# Patient Record
Sex: FEMALE | Race: WHITE | Employment: FULL TIME | URBAN - METROPOLITAN AREA
[De-identification: names, ages, dates, MRNs, and addresses within clinical notes are randomized per-mention and may not be internally consistent; named-entity substitution may affect disease eponyms.]

---

## 2019-06-26 ENCOUNTER — OFFICE VISIT (OUTPATIENT)
Dept: FAMILY MEDICINE CLINIC | Age: 49
End: 2019-06-26
Payer: COMMERCIAL

## 2019-06-26 ENCOUNTER — TELEPHONE (OUTPATIENT)
Dept: PRIMARY CARE CLINIC | Age: 49
End: 2019-06-26

## 2019-06-26 VITALS
BODY MASS INDEX: 27.32 KG/M2 | TEMPERATURE: 98.7 F | OXYGEN SATURATION: 97 % | HEART RATE: 94 BPM | HEIGHT: 66 IN | SYSTOLIC BLOOD PRESSURE: 118 MMHG | WEIGHT: 170 LBS | DIASTOLIC BLOOD PRESSURE: 70 MMHG

## 2019-06-26 DIAGNOSIS — R09.82 POSTNASAL DRIP: ICD-10-CM

## 2019-06-26 DIAGNOSIS — J01.90 ACUTE NON-RECURRENT SINUSITIS, UNSPECIFIED LOCATION: Primary | ICD-10-CM

## 2019-06-26 DIAGNOSIS — J06.9 UPPER RESPIRATORY TRACT INFECTION, UNSPECIFIED TYPE: ICD-10-CM

## 2019-06-26 DIAGNOSIS — R07.0 PAIN IN THROAT: ICD-10-CM

## 2019-06-26 PROCEDURE — 96372 THER/PROPH/DIAG INJ SC/IM: CPT | Performed by: PHYSICIAN ASSISTANT

## 2019-06-26 PROCEDURE — 99214 OFFICE O/P EST MOD 30 MIN: CPT | Performed by: PHYSICIAN ASSISTANT

## 2019-06-26 RX ORDER — METHYLPREDNISOLONE ACETATE 40 MG/ML
40 INJECTION, SUSPENSION INTRA-ARTICULAR; INTRALESIONAL; INTRAMUSCULAR; SOFT TISSUE ONCE
Status: COMPLETED | OUTPATIENT
Start: 2019-06-26 | End: 2019-06-26

## 2019-06-26 RX ORDER — DOXYCYCLINE HYCLATE 100 MG
100 TABLET ORAL 2 TIMES DAILY
Qty: 20 TABLET | Refills: 0 | Status: SHIPPED | OUTPATIENT
Start: 2019-06-26 | End: 2019-07-06

## 2019-06-26 RX ADMIN — METHYLPREDNISOLONE ACETATE 40 MG: 40 INJECTION, SUSPENSION INTRA-ARTICULAR; INTRALESIONAL; INTRAMUSCULAR; SOFT TISSUE at 09:33

## 2019-06-26 NOTE — TELEPHONE ENCOUNTER
They are ordered and [facility] for August 15, 2019. TSH, free T4 and free T3. Diagnosis abnormal weight gain, fatigue. She need them sooner?

## 2019-06-26 NOTE — PROGRESS NOTES
0    Allergies: Allergies   Allergen Reactions    Morphine      Reaction is reddened skin and patient \"blacks out\"    Oxycodone Anxiety    Adhesive Tape      Reaction is sloughing of skin    Alprazolam     Hydrocodone-Acetaminophen     Oseltamivir     Oxycontin [Oxycodone Hcl]      Reaction is reddened skin and patient \"blacks out\"    Tamiflu [Oseltamivir Phosphate]      Reaction is hallucinations    Tamiflu  [Oseltamivir Phosphate] Nausea And Vomiting       Social History:     Social History     Tobacco Use    Smoking status: Never Smoker    Smokeless tobacco: Never Used   Substance Use Topics    Alcohol use: No    Drug use: No       Physical Exam:     Vitals:    06/26/19 0910   BP: 118/70   Pulse: 94   Temp: 98.7 °F (37.1 °C)   TempSrc: Oral   SpO2: 97%   Weight: 170 lb (77.1 kg)   Height: 5' 6\" (1.676 m)       Exam:  Physical Exam  Nurse's notes and vital signs reviewed. The patient is not hypoxic. General: Alert, no acute distress, patient resting comfortably Patient is not toxic or lethargic. Skin: Warm, intact, no pallor noted. There is no evidence of rash at this time. Head: Normocephalic, atraumatic. Eye: Normal conjunctiva  Ears, Nose, Throat: Right tympanic membrane clear, left tympanic membrane clear. No drainage or discharge noted. No pre- or post-auricular tenderness, erythema, or swelling noted. Moderate nasal congestion and rhinorrhea. No facial erythema. Posterior oropharynx shows erythema and cobblestoning, but no hypertrophy, asymmetry or peritonsillar abscess and no evidence of exudate. the uvula is midline. No trismus or drooling is noted. Moist mucous membranes. Neck: No anterior/posterior lymphadenopathy noted. No erythema, no masses, no fluctuance or induration noted. No meningeal signs. Cardio: Regular Rate and Rhythm  Respiratory: No acute distress, no rhonchi, wheezing or crackles noted. No stridor or retractions are noted.   Neurological: A&O x4, normal speech  Psychiatric: Cooperative         Testing:           Medical Decision Making:   The patient has been seen and evaluated. The vital signs have been reviewed. The patient does not appear to be toxic or lethargic. The patient will be started on doxycycline. Patient will be given intramuscular injection of methylprednisone. The patient was educated on the proper dosage of motrin and tylenol and the appropriate intervals of each. The patient is to increase fluid intake over the next several days. The patient is to use OTC decongestant as needed. The patient is to return to express care or go directly to the emergency department should any of the signs or symptoms worsen. The patient is to followup with primary care physician in 2-3 days for repeat evaluation. The patient has no other questions or concerns at this time the patient will be discharged home. Clinical Impression:   Maegan Greenberg was seen today for cough. Diagnoses and all orders for this visit:    Acute non-recurrent sinusitis, unspecified location    Upper respiratory tract infection, unspecified type    Pain in throat    Postnasal drip    Other orders  -     doxycycline hyclate (VIBRA-TABS) 100 MG tablet; Take 1 tablet by mouth 2 times daily for 10 days  -     methylPREDNISolone acetate (DEPO-MEDROL) injection 40 mg        The patient is to call for any concerns or return if any of the signs or symptoms worsen. The patient is to follow-up with PCP in the next 2-3 days for repeat evaluation repeat assessment or go directly to the emergency department.      SIGNATURE: Niki Lake III, PA-C

## 2019-07-19 ENCOUNTER — HOSPITAL ENCOUNTER (OUTPATIENT)
Age: 49
Discharge: HOME | End: 2019-07-21
Payer: COMMERCIAL

## 2019-07-19 ENCOUNTER — OFFICE VISIT (OUTPATIENT)
Dept: FAMILY MEDICINE CLINIC | Age: 49
End: 2019-07-19
Payer: COMMERCIAL

## 2019-07-19 VITALS
SYSTOLIC BLOOD PRESSURE: 136 MMHG | WEIGHT: 173 LBS | TEMPERATURE: 98.2 F | BODY MASS INDEX: 27.92 KG/M2 | OXYGEN SATURATION: 98 % | HEART RATE: 69 BPM | DIASTOLIC BLOOD PRESSURE: 72 MMHG

## 2019-07-19 DIAGNOSIS — R31.9 URINARY TRACT INFECTION WITH HEMATURIA, SITE UNSPECIFIED: ICD-10-CM

## 2019-07-19 DIAGNOSIS — R31.9 URINARY TRACT INFECTION WITH HEMATURIA, SITE UNSPECIFIED: Primary | ICD-10-CM

## 2019-07-19 DIAGNOSIS — N39.0 URINARY TRACT INFECTION WITH HEMATURIA, SITE UNSPECIFIED: ICD-10-CM

## 2019-07-19 DIAGNOSIS — R31.9 HEMATURIA, UNSPECIFIED TYPE: ICD-10-CM

## 2019-07-19 DIAGNOSIS — N39.0 URINARY TRACT INFECTION WITH HEMATURIA, SITE UNSPECIFIED: Primary | ICD-10-CM

## 2019-07-19 LAB
BILIRUBIN, POC: NORMAL
BLOOD URINE, POC: NORMAL
CLARITY, POC: YELLOW
COLOR, POC: NORMAL
GLUCOSE URINE, POC: NORMAL
KETONES, POC: NORMAL
LEUKOCYTE EST, POC: NORMAL
NITRITE, POC: NORMAL
PH, POC: 8
PROTEIN, POC: NORMAL
SPECIFIC GRAVITY, POC: 1.01
UROBILINOGEN, POC: 0.2

## 2019-07-19 PROCEDURE — 81002 URINALYSIS NONAUTO W/O SCOPE: CPT | Performed by: FAMILY MEDICINE

## 2019-07-19 PROCEDURE — 87088 URINE BACTERIA CULTURE: CPT

## 2019-07-19 PROCEDURE — 99213 OFFICE O/P EST LOW 20 MIN: CPT | Performed by: FAMILY MEDICINE

## 2019-07-19 RX ORDER — CEPHALEXIN 500 MG/1
500 CAPSULE ORAL 2 TIMES DAILY
Qty: 14 CAPSULE | Refills: 0 | Status: SHIPPED | OUTPATIENT
Start: 2019-07-19 | End: 2019-07-26

## 2019-07-22 LAB — URINE CULTURE, ROUTINE: NORMAL

## 2019-08-15 ENCOUNTER — OFFICE VISIT (OUTPATIENT)
Dept: PRIMARY CARE CLINIC | Age: 49
End: 2019-08-15
Payer: COMMERCIAL

## 2019-08-15 VITALS
DIASTOLIC BLOOD PRESSURE: 64 MMHG | WEIGHT: 176 LBS | HEIGHT: 66 IN | BODY MASS INDEX: 28.28 KG/M2 | TEMPERATURE: 98 F | SYSTOLIC BLOOD PRESSURE: 118 MMHG

## 2019-08-15 DIAGNOSIS — R74.8 ELEVATED LIVER ENZYMES: ICD-10-CM

## 2019-08-15 DIAGNOSIS — G43.809 OTHER MIGRAINE WITHOUT STATUS MIGRAINOSUS, NOT INTRACTABLE: ICD-10-CM

## 2019-08-15 DIAGNOSIS — M25.50 MULTIPLE JOINT PAIN: ICD-10-CM

## 2019-08-15 DIAGNOSIS — M75.111 NONTRAUMATIC INCOMPLETE TEAR OF RIGHT ROTATOR CUFF: ICD-10-CM

## 2019-08-15 DIAGNOSIS — R63.5 WEIGHT GAIN: ICD-10-CM

## 2019-08-15 DIAGNOSIS — D49.7 PITUITARY NEOPLASM: ICD-10-CM

## 2019-08-15 DIAGNOSIS — M25.562 ACUTE PAIN OF BOTH KNEES: ICD-10-CM

## 2019-08-15 DIAGNOSIS — J30.89 ALLERGIC RHINITIS DUE TO OTHER ALLERGIC TRIGGER, UNSPECIFIED SEASONALITY: ICD-10-CM

## 2019-08-15 DIAGNOSIS — N39.0 URINARY TRACT INFECTION WITHOUT HEMATURIA, SITE UNSPECIFIED: Primary | ICD-10-CM

## 2019-08-15 DIAGNOSIS — M25.561 ACUTE PAIN OF BOTH KNEES: ICD-10-CM

## 2019-08-15 DIAGNOSIS — E66.3 OVERWEIGHT (BMI 25.0-29.9): ICD-10-CM

## 2019-08-15 PROBLEM — J30.9 ALLERGIC RHINITIS: Status: ACTIVE | Noted: 2019-08-15

## 2019-08-15 PROBLEM — G43.909 MIGRAINE: Status: ACTIVE | Noted: 2019-08-15

## 2019-08-15 PROCEDURE — 99215 OFFICE O/P EST HI 40 MIN: CPT | Performed by: FAMILY MEDICINE

## 2019-08-15 RX ORDER — FLUCONAZOLE 150 MG/1
TABLET ORAL
Qty: 2 TABLET | Refills: 0 | Status: SHIPPED | OUTPATIENT
Start: 2019-08-15 | End: 2019-09-03

## 2019-08-15 ASSESSMENT — PATIENT HEALTH QUESTIONNAIRE - PHQ9
SUM OF ALL RESPONSES TO PHQ QUESTIONS 1-9: 1
2. FEELING DOWN, DEPRESSED OR HOPELESS: 0
1. LITTLE INTEREST OR PLEASURE IN DOING THINGS: 1
SUM OF ALL RESPONSES TO PHQ9 QUESTIONS 1 & 2: 1
SUM OF ALL RESPONSES TO PHQ QUESTIONS 1-9: 1

## 2019-08-15 NOTE — ASSESSMENT & PLAN NOTE
Counseled extensively. Differential reviewed, including serious etiologies. Rule out. Possible vaginal candidiasis. Last urine culture negative. Check urinalysis culture. She like empirically treated with Diflucan.   She should see gynecologist ASAP

## 2019-08-15 NOTE — PROGRESS NOTES
including serious etiologies. He is to follow with neurology. Used to take Topamax. Overall states symptoms been stable. Recommended avoidance of triggers . signs and symptoms to watch for discussed. Serious signs and symptoms  reviewed. ER if any. Acute pain of both knees  Counseled extensively. Differential reviewed, including serious etiologies. Declines anything be done now in terms of evaluation treatment. Notify if she changes her mind. Declines x-ray, ultrasound, physical therapy, referral.  Would like autoimmune testing. Multiple joint pain  Counseled extensively. Differential reviewed, including serious etiologies. Check blood work. Declines imaging    Nontraumatic incomplete tear of right rotator cuff  History of. States doing much better. Had Physical therapy. Saw Ortho    Pituitary neoplasm  Counseled extensively. Differential reviewed, including serious etiologies. Blood  work negative in past.  Repeat prolactin level. Refuses repeat imaging including MRI. Refuses seeing endocrinology or neurosurgery. No longer seeing neurology. .      Elevated liver enzymes  History of. Resolved. Monitor         No flowsheet data found. Plan as above. Counseled extensively and differential diagnoses relevant to above were reviewed, including serious etiologies. Side effects and interactions of medications were reviewed. Counseled extensively. She like to try Diflucan. She should see the gynecologist ASAP. Check urinalysis and culture. Check extensive blood work, she is to clear with insurance first.  Declines near leg imaging including x-ray or ultrasound. Declines Ortho referral.  She is going to start Nasacort or equivalent daily as needed plus/minus Allegra daily or equivalent daily as needed. Declines brain MRI/MRA or pituitary imaging.     As long as symptoms steadily improve/resolve keep follow-up September 3, sooner as needed        As long as symptoms steadily improve/resolve, and medical conditions follow the expected course, FU as below, sooner PRN. Return keep 9/3 fu, sooner prn. Over 40 minutes  spent with the patient in reviewing records, reviewing with patient/family, counseling, ordering,  prescribing, completing h&p, etc., with over 50% of the time spent face to face counseling. Signs and symptoms to watch for discussed, serious signs and symptoms reviewed. ER if any. Prabha Downey MD    Patients are advised to check with insurance company to ensure coverage and to fully understand benefits and cost prior to any testing. This note was created with the assistance of voice recognition software. Document was reviewed however may contain grammatical errors.

## 2019-08-19 ENCOUNTER — HOSPITAL ENCOUNTER (OUTPATIENT)
Age: 49
Discharge: HOME | End: 2019-08-21
Payer: COMMERCIAL

## 2019-08-19 ENCOUNTER — TELEPHONE (OUTPATIENT)
Dept: PRIMARY CARE CLINIC | Age: 49
End: 2019-08-19

## 2019-08-19 DIAGNOSIS — M25.50 MULTIPLE JOINT PAIN: ICD-10-CM

## 2019-08-19 DIAGNOSIS — M25.561 ACUTE PAIN OF BOTH KNEES: ICD-10-CM

## 2019-08-19 DIAGNOSIS — G43.809 OTHER MIGRAINE WITHOUT STATUS MIGRAINOSUS, NOT INTRACTABLE: ICD-10-CM

## 2019-08-19 DIAGNOSIS — E66.3 OVERWEIGHT (BMI 25.0-29.9): ICD-10-CM

## 2019-08-19 DIAGNOSIS — J30.89 ALLERGIC RHINITIS DUE TO OTHER ALLERGIC TRIGGER, UNSPECIFIED SEASONALITY: ICD-10-CM

## 2019-08-19 DIAGNOSIS — R63.5 WEIGHT GAIN: ICD-10-CM

## 2019-08-19 DIAGNOSIS — M25.562 ACUTE PAIN OF BOTH KNEES: ICD-10-CM

## 2019-08-19 DIAGNOSIS — N39.0 URINARY TRACT INFECTION WITHOUT HEMATURIA, SITE UNSPECIFIED: ICD-10-CM

## 2019-08-19 DIAGNOSIS — M75.111 NONTRAUMATIC INCOMPLETE TEAR OF RIGHT ROTATOR CUFF: ICD-10-CM

## 2019-08-19 DIAGNOSIS — D49.7 PITUITARY NEOPLASM: ICD-10-CM

## 2019-08-19 LAB
ALBUMIN SERPL-MCNC: 4.3 G/DL (ref 3.5–5.2)
ALP BLD-CCNC: 84 U/L (ref 35–104)
ALT SERPL-CCNC: 11 U/L (ref 0–32)
ANION GAP SERPL CALCULATED.3IONS-SCNC: 15 MMOL/L (ref 7–16)
AST SERPL-CCNC: 15 U/L (ref 0–31)
BACTERIA: NORMAL /HPF
BASOPHILS ABSOLUTE: 0.04 E9/L (ref 0–0.2)
BASOPHILS RELATIVE PERCENT: 0.6 % (ref 0–2)
BILIRUB SERPL-MCNC: 0.8 MG/DL (ref 0–1.2)
BILIRUBIN URINE: NEGATIVE
BLOOD, URINE: NORMAL
BUN BLDV-MCNC: 16 MG/DL (ref 6–20)
CALCIUM SERPL-MCNC: 9.3 MG/DL (ref 8.6–10.2)
CHLORIDE BLD-SCNC: 103 MMOL/L (ref 98–107)
CHOLESTEROL, TOTAL: 176 MG/DL (ref 0–199)
CLARITY: CLEAR
CO2: 25 MMOL/L (ref 22–29)
COLOR: YELLOW
CREAT SERPL-MCNC: 0.6 MG/DL (ref 0.5–1)
EOSINOPHILS ABSOLUTE: 0.16 E9/L (ref 0.05–0.5)
EOSINOPHILS RELATIVE PERCENT: 2.6 % (ref 0–6)
GFR AFRICAN AMERICAN: >60
GFR NON-AFRICAN AMERICAN: >60 ML/MIN/1.73
GLUCOSE BLD-MCNC: 90 MG/DL (ref 74–99)
GLUCOSE URINE: NEGATIVE MG/DL
HCT VFR BLD CALC: 40.7 % (ref 34–48)
HDLC SERPL-MCNC: 77 MG/DL
HEMOGLOBIN: 13 G/DL (ref 11.5–15.5)
IMMATURE GRANULOCYTES #: 0.02 E9/L
IMMATURE GRANULOCYTES %: 0.3 % (ref 0–5)
KETONES, URINE: NEGATIVE MG/DL
LDL CHOLESTEROL CALCULATED: 85 MG/DL (ref 0–99)
LEUKOCYTE ESTERASE, URINE: NEGATIVE
LYMPHOCYTES ABSOLUTE: 2.33 E9/L (ref 1.5–4)
LYMPHOCYTES RELATIVE PERCENT: 37.3 % (ref 20–42)
MCH RBC QN AUTO: 29 PG (ref 26–35)
MCHC RBC AUTO-ENTMCNC: 31.9 % (ref 32–34.5)
MCV RBC AUTO: 90.6 FL (ref 80–99.9)
MONOCYTES ABSOLUTE: 0.43 E9/L (ref 0.1–0.95)
MONOCYTES RELATIVE PERCENT: 6.9 % (ref 2–12)
NEUTROPHILS ABSOLUTE: 3.26 E9/L (ref 1.8–7.3)
NEUTROPHILS RELATIVE PERCENT: 52.3 % (ref 43–80)
NITRITE, URINE: NEGATIVE
PDW BLD-RTO: 13.1 FL (ref 11.5–15)
PH UA: 6 (ref 5–9)
PLATELET # BLD: 226 E9/L (ref 130–450)
PMV BLD AUTO: 10.2 FL (ref 7–12)
POTASSIUM SERPL-SCNC: 3.9 MMOL/L (ref 3.5–5)
PROLACTIN: 17.16 NG/ML
PROTEIN UA: NEGATIVE MG/DL
RBC # BLD: 4.49 E12/L (ref 3.5–5.5)
RBC UA: NORMAL /HPF (ref 0–2)
RHEUMATOID FACTOR: 10 IU/ML (ref 0–13)
SODIUM BLD-SCNC: 143 MMOL/L (ref 132–146)
SPECIFIC GRAVITY UA: <=1.005 (ref 1–1.03)
T4 FREE: 1.27 NG/DL (ref 0.93–1.7)
TOTAL PROTEIN: 6.9 G/DL (ref 6.4–8.3)
TRIGL SERPL-MCNC: 71 MG/DL (ref 0–149)
TSH SERPL DL<=0.05 MIU/L-ACNC: 1.6 UIU/ML (ref 0.27–4.2)
URIC ACID, SERUM: 3.8 MG/DL (ref 2.4–5.7)
UROBILINOGEN, URINE: 0.2 E.U./DL
VLDLC SERPL CALC-MCNC: 14 MG/DL
WBC # BLD: 6.2 E9/L (ref 4.5–11.5)
WBC UA: NORMAL /HPF (ref 0–5)

## 2019-08-19 PROCEDURE — 86225 DNA ANTIBODY NATIVE: CPT

## 2019-08-19 PROCEDURE — 84146 ASSAY OF PROLACTIN: CPT

## 2019-08-19 PROCEDURE — 80061 LIPID PANEL: CPT

## 2019-08-19 PROCEDURE — 84550 ASSAY OF BLOOD/URIC ACID: CPT

## 2019-08-19 PROCEDURE — 84439 ASSAY OF FREE THYROXINE: CPT

## 2019-08-19 PROCEDURE — 86431 RHEUMATOID FACTOR QUANT: CPT

## 2019-08-19 PROCEDURE — 86038 ANTINUCLEAR ANTIBODIES: CPT

## 2019-08-19 PROCEDURE — 81001 URINALYSIS AUTO W/SCOPE: CPT

## 2019-08-19 PROCEDURE — 80053 COMPREHEN METABOLIC PANEL: CPT

## 2019-08-19 PROCEDURE — 86200 CCP ANTIBODY: CPT

## 2019-08-19 PROCEDURE — 85025 COMPLETE CBC W/AUTO DIFF WBC: CPT

## 2019-08-19 PROCEDURE — 36415 COLL VENOUS BLD VENIPUNCTURE: CPT

## 2019-08-19 PROCEDURE — 84443 ASSAY THYROID STIM HORMONE: CPT

## 2019-08-19 PROCEDURE — 87088 URINE BACTERIA CULTURE: CPT

## 2019-08-21 ENCOUNTER — TELEPHONE (OUTPATIENT)
Dept: PRIMARY CARE CLINIC | Age: 49
End: 2019-08-21

## 2019-08-21 LAB
ANTI DNA DOUBLE STRANDED: NEGATIVE
ANTI-NUCLEAR ANTIBODY (ANA): NEGATIVE
URINE CULTURE, ROUTINE: NORMAL

## 2019-08-22 DIAGNOSIS — M79.605 LEG PAIN, BILATERAL: ICD-10-CM

## 2019-08-22 DIAGNOSIS — M79.604 LEG PAIN, BILATERAL: ICD-10-CM

## 2019-08-22 DIAGNOSIS — M25.562 ACUTE PAIN OF BOTH KNEES: Primary | ICD-10-CM

## 2019-08-22 DIAGNOSIS — M25.561 ACUTE PAIN OF BOTH KNEES: Primary | ICD-10-CM

## 2019-08-22 LAB — CCP IGG ANTIBODIES: 3 UNITS (ref 0–19)

## 2019-08-26 RX ORDER — PREDNISONE 10 MG/1
10 TABLET ORAL DAILY
COMMUNITY
End: 2019-09-03

## 2019-08-26 RX ORDER — ALBUTEROL SULFATE 90 UG/1
2 AEROSOL, METERED RESPIRATORY (INHALATION) EVERY 6 HOURS PRN
COMMUNITY
End: 2019-09-03

## 2019-08-26 RX ORDER — CEFDINIR 300 MG/1
300 CAPSULE ORAL 2 TIMES DAILY
COMMUNITY
End: 2019-09-03

## 2019-08-26 SDOH — HEALTH STABILITY: MENTAL HEALTH: HOW OFTEN DO YOU HAVE A DRINK CONTAINING ALCOHOL?: NEVER

## 2019-09-03 ENCOUNTER — OFFICE VISIT (OUTPATIENT)
Dept: PRIMARY CARE CLINIC | Age: 49
End: 2019-09-03
Payer: COMMERCIAL

## 2019-09-03 VITALS
HEART RATE: 68 BPM | SYSTOLIC BLOOD PRESSURE: 110 MMHG | WEIGHT: 178 LBS | BODY MASS INDEX: 28.73 KG/M2 | DIASTOLIC BLOOD PRESSURE: 62 MMHG | OXYGEN SATURATION: 98 %

## 2019-09-03 DIAGNOSIS — D49.7 PITUITARY NEOPLASM: ICD-10-CM

## 2019-09-03 DIAGNOSIS — M75.111 NONTRAUMATIC INCOMPLETE TEAR OF RIGHT ROTATOR CUFF: ICD-10-CM

## 2019-09-03 DIAGNOSIS — M25.50 MULTIPLE JOINT PAIN: ICD-10-CM

## 2019-09-03 DIAGNOSIS — M25.561 ACUTE PAIN OF BOTH KNEES: Primary | ICD-10-CM

## 2019-09-03 DIAGNOSIS — M25.562 ACUTE PAIN OF BOTH KNEES: Primary | ICD-10-CM

## 2019-09-03 DIAGNOSIS — R74.8 ELEVATED LIVER ENZYMES: ICD-10-CM

## 2019-09-03 PROCEDURE — 99214 OFFICE O/P EST MOD 30 MIN: CPT | Performed by: FAMILY MEDICINE

## 2019-09-03 NOTE — PROGRESS NOTES
Multiple joint pain         Urinary tract infection without hematuria  Symptoms resolved. Urinalysis culture negative. Acute pain of both knees  Counseled extensively. Differential reviewed, including serious etiologies. Autoimmune work-up, x-ray and ultrasound all negative. Questionable etiology. Would like to go to [address] [place] [address]. Refer to Ortho. Multiple joint pain  Resolved. Autoimmune work-up negative    Nontraumatic incomplete tear of right rotator cuff  With history of rotator cuff tear and frozen shoulder. Frozen shoulder resolved and overall doing well. Was sent to physical therapy by Ortho and subsequently discharged. Was following with [address] [place] [address] Dr. Selin Arredondo neoplasm  Counseled extensively. Differential reviewed, including serious etiologies. Blood  work negative in past.  Prolactin 9/19 NEG. Still  Refuses repeat imaging including MRI. Refuses seeing endocrinology or neurosurgery. No longer seeing neurology    Elevated liver enzymes  normalized         No flowsheet data found. Plan as above. Counseled extensively and differential diagnoses relevant to above were reviewed, including serious etiologies. Side effects and interactions of medications were reviewed. Would like to see Mihaela Madrid. Declines endocrinology otherwise. Otherwise can follow-up in 6 weeks for physical sooner. As long as symptoms steadily improve/resolve, and medical conditions follow the expected course, FU as below, sooner PRN. Return in about 6 weeks (around 10/15/2019), or if symptoms worsen or fail to improve, for physical.         Signs and symptoms to watch for discussed, serious signs and symptoms reviewed. ER if any. Dexter Eubanks MD    Patients are advised to check with insurance company to ensure coverage and to fully understand benefits and cost prior to any testing. This note was created with the assistance of voice recognition software.   Document was reviewed however may

## 2019-09-03 NOTE — ASSESSMENT & PLAN NOTE
With history of rotator cuff tear and frozen shoulder. Frozen shoulder resolved and overall doing well. Was sent to physical therapy by Ortho and subsequently discharged.   Was following with [facility] Dr. Ahuja Amen

## 2019-09-27 ENCOUNTER — OFFICE VISIT (OUTPATIENT)
Dept: FAMILY MEDICINE CLINIC | Age: 49
End: 2019-09-27
Payer: COMMERCIAL

## 2019-09-27 VITALS
BODY MASS INDEX: 28.61 KG/M2 | DIASTOLIC BLOOD PRESSURE: 70 MMHG | SYSTOLIC BLOOD PRESSURE: 118 MMHG | WEIGHT: 178 LBS | TEMPERATURE: 97.7 F | HEART RATE: 64 BPM | OXYGEN SATURATION: 97 % | HEIGHT: 66 IN

## 2019-09-27 DIAGNOSIS — L25.5 RHUS DERMATITIS: Primary | ICD-10-CM

## 2019-09-27 PROCEDURE — 99213 OFFICE O/P EST LOW 20 MIN: CPT | Performed by: FAMILY MEDICINE

## 2019-09-27 RX ORDER — PREDNISONE 10 MG/1
TABLET ORAL
Qty: 21 TABLET | Refills: 0 | Status: SHIPPED | OUTPATIENT
Start: 2019-09-27 | End: 2019-10-09

## 2019-10-09 ENCOUNTER — OFFICE VISIT (OUTPATIENT)
Dept: PRIMARY CARE CLINIC | Age: 49
End: 2019-10-09
Payer: COMMERCIAL

## 2019-10-09 VITALS
HEART RATE: 66 BPM | OXYGEN SATURATION: 97 % | DIASTOLIC BLOOD PRESSURE: 70 MMHG | SYSTOLIC BLOOD PRESSURE: 128 MMHG | BODY MASS INDEX: 28.08 KG/M2 | WEIGHT: 174 LBS

## 2019-10-09 DIAGNOSIS — Z00.00 HEALTH MAINTENANCE EXAMINATION: Primary | ICD-10-CM

## 2019-10-09 DIAGNOSIS — M25.562 ACUTE PAIN OF BOTH KNEES: ICD-10-CM

## 2019-10-09 DIAGNOSIS — M75.111 NONTRAUMATIC INCOMPLETE TEAR OF RIGHT ROTATOR CUFF: ICD-10-CM

## 2019-10-09 DIAGNOSIS — M25.561 ACUTE PAIN OF BOTH KNEES: ICD-10-CM

## 2019-10-09 DIAGNOSIS — G43.809 OTHER MIGRAINE WITHOUT STATUS MIGRAINOSUS, NOT INTRACTABLE: ICD-10-CM

## 2019-10-09 DIAGNOSIS — E66.3 OVERWEIGHT (BMI 25.0-29.9): ICD-10-CM

## 2019-10-09 DIAGNOSIS — J30.89 ALLERGIC RHINITIS DUE TO OTHER ALLERGIC TRIGGER, UNSPECIFIED SEASONALITY: ICD-10-CM

## 2019-10-09 DIAGNOSIS — R31.9 HEMATURIA, UNSPECIFIED TYPE: ICD-10-CM

## 2019-10-09 DIAGNOSIS — R63.5 WEIGHT GAIN: ICD-10-CM

## 2019-10-09 DIAGNOSIS — D49.7 PITUITARY NEOPLASM: ICD-10-CM

## 2019-10-09 PROCEDURE — 99396 PREV VISIT EST AGE 40-64: CPT | Performed by: FAMILY MEDICINE

## 2019-11-08 PROBLEM — Z00.00 HEALTH MAINTENANCE EXAMINATION: Status: RESOLVED | Noted: 2019-10-09 | Resolved: 2019-11-08

## 2019-11-25 ENCOUNTER — OFFICE VISIT (OUTPATIENT)
Dept: FAMILY MEDICINE CLINIC | Age: 49
End: 2019-11-25
Payer: COMMERCIAL

## 2019-11-25 VITALS
TEMPERATURE: 96.6 F | DIASTOLIC BLOOD PRESSURE: 76 MMHG | SYSTOLIC BLOOD PRESSURE: 120 MMHG | OXYGEN SATURATION: 97 % | HEART RATE: 75 BPM | BODY MASS INDEX: 28.73 KG/M2 | WEIGHT: 178 LBS

## 2019-11-25 DIAGNOSIS — H68.002 SALPINGITIS OF LEFT EUSTACHIAN TUBE: ICD-10-CM

## 2019-11-25 DIAGNOSIS — T14.8XXA SPLINTER IN SKIN: Primary | ICD-10-CM

## 2019-11-25 PROCEDURE — 99213 OFFICE O/P EST LOW 20 MIN: CPT | Performed by: FAMILY MEDICINE

## 2019-11-25 ASSESSMENT — ENCOUNTER SYMPTOMS
ALLERGIC/IMMUNOLOGIC NEGATIVE: 1
GASTROINTESTINAL NEGATIVE: 1
RESPIRATORY NEGATIVE: 1
EYES NEGATIVE: 1

## 2019-12-28 ENCOUNTER — OFFICE VISIT (OUTPATIENT)
Dept: FAMILY MEDICINE CLINIC | Age: 49
End: 2019-12-28
Payer: COMMERCIAL

## 2019-12-28 ENCOUNTER — HOSPITAL ENCOUNTER (OUTPATIENT)
Age: 49
Discharge: HOME | End: 2019-12-30
Payer: COMMERCIAL

## 2019-12-28 VITALS
TEMPERATURE: 97.4 F | HEART RATE: 69 BPM | WEIGHT: 180 LBS | SYSTOLIC BLOOD PRESSURE: 134 MMHG | OXYGEN SATURATION: 96 % | BODY MASS INDEX: 29.05 KG/M2 | DIASTOLIC BLOOD PRESSURE: 72 MMHG

## 2019-12-28 DIAGNOSIS — R09.81 SINUS CONGESTION: ICD-10-CM

## 2019-12-28 DIAGNOSIS — R31.9 HEMATURIA, UNSPECIFIED TYPE: Primary | ICD-10-CM

## 2019-12-28 LAB
APPEARANCE FLUID: NORMAL
BILIRUBIN, POC: NORMAL
BLOOD URINE, POC: NORMAL
CLARITY, POC: CLEAR
COLOR, POC: YELLOW
GLUCOSE URINE, POC: NORMAL
KETONES, POC: NORMAL
LEUKOCYTE EST, POC: NORMAL
NITRITE, POC: NORMAL
PH, POC: 6
PROTEIN, POC: NORMAL
SPECIFIC GRAVITY, POC: 1.02
UROBILINOGEN, POC: 0.2

## 2019-12-28 PROCEDURE — 81002 URINALYSIS NONAUTO W/O SCOPE: CPT | Performed by: NURSE PRACTITIONER

## 2019-12-28 PROCEDURE — 99214 OFFICE O/P EST MOD 30 MIN: CPT | Performed by: NURSE PRACTITIONER

## 2019-12-28 RX ORDER — CIPROFLOXACIN 500 MG/1
500 TABLET, FILM COATED ORAL 2 TIMES DAILY
Qty: 14 TABLET | Refills: 0 | Status: SHIPPED | OUTPATIENT
Start: 2019-12-28 | End: 2020-01-04

## 2019-12-29 DIAGNOSIS — R31.9 HEMATURIA, UNSPECIFIED TYPE: ICD-10-CM

## 2019-12-30 ENCOUNTER — TELEPHONE (OUTPATIENT)
Dept: PRIMARY CARE CLINIC | Age: 49
End: 2019-12-30

## 2019-12-30 ENCOUNTER — TELEPHONE (OUTPATIENT)
Dept: FAMILY MEDICINE CLINIC | Age: 49
End: 2019-12-30

## 2020-02-13 ENCOUNTER — NURSE TRIAGE (OUTPATIENT)
Dept: OTHER | Facility: CLINIC | Age: 50
End: 2020-02-13

## 2020-02-13 ENCOUNTER — APPOINTMENT (OUTPATIENT)
Dept: CT IMAGING | Age: 50
End: 2020-02-13
Payer: COMMERCIAL

## 2020-02-13 ENCOUNTER — HOSPITAL ENCOUNTER (EMERGENCY)
Age: 50
Discharge: HOME | End: 2020-02-13
Attending: EMERGENCY MEDICINE
Payer: COMMERCIAL

## 2020-02-13 ENCOUNTER — TELEPHONE (OUTPATIENT)
Dept: FAMILY MEDICINE CLINIC | Age: 50
End: 2020-02-13

## 2020-02-13 VITALS
HEART RATE: 91 BPM | SYSTOLIC BLOOD PRESSURE: 119 MMHG | DIASTOLIC BLOOD PRESSURE: 56 MMHG | RESPIRATION RATE: 16 BRPM | TEMPERATURE: 98.2 F | OXYGEN SATURATION: 100 %

## 2020-02-13 LAB
ALBUMIN SERPL-MCNC: 4.8 G/DL (ref 3.5–5.2)
ALP BLD-CCNC: 111 U/L (ref 35–104)
ALT SERPL-CCNC: 16 U/L (ref 0–32)
ANION GAP SERPL CALCULATED.3IONS-SCNC: 10 MMOL/L (ref 7–16)
AST SERPL-CCNC: 20 U/L (ref 0–31)
BACTERIA: ABNORMAL /HPF
BASOPHILS ABSOLUTE: 0.02 E9/L (ref 0–0.2)
BASOPHILS RELATIVE PERCENT: 0.4 % (ref 0–2)
BILIRUB SERPL-MCNC: 1.2 MG/DL (ref 0–1.2)
BILIRUBIN URINE: NEGATIVE
BLOOD, URINE: ABNORMAL
BUN BLDV-MCNC: 13 MG/DL (ref 6–20)
CALCIUM SERPL-MCNC: 9.5 MG/DL (ref 8.6–10.2)
CHLORIDE BLD-SCNC: 101 MMOL/L (ref 98–107)
CLARITY: CLEAR
CO2: 25 MMOL/L (ref 22–29)
COLOR: YELLOW
CREAT SERPL-MCNC: 0.7 MG/DL (ref 0.5–1)
EOSINOPHILS ABSOLUTE: 0.08 E9/L (ref 0.05–0.5)
EOSINOPHILS RELATIVE PERCENT: 1.7 % (ref 0–6)
GFR AFRICAN AMERICAN: >60
GFR NON-AFRICAN AMERICAN: >60 ML/MIN/1.73
GLUCOSE BLD-MCNC: 96 MG/DL (ref 74–99)
GLUCOSE URINE: NEGATIVE MG/DL
HCG, URINE, POC: NEGATIVE
HCT VFR BLD CALC: 48.2 % (ref 34–48)
HEMOGLOBIN: 15.5 G/DL (ref 11.5–15.5)
IMMATURE GRANULOCYTES #: 0.01 E9/L
IMMATURE GRANULOCYTES %: 0.2 % (ref 0–5)
KETONES, URINE: NEGATIVE MG/DL
LACTIC ACID: 0.5 MMOL/L (ref 0.5–2.2)
LEUKOCYTE ESTERASE, URINE: NEGATIVE
LIPASE: 21 U/L (ref 13–60)
LYMPHOCYTES ABSOLUTE: 0.92 E9/L (ref 1.5–4)
LYMPHOCYTES RELATIVE PERCENT: 19.4 % (ref 20–42)
Lab: NORMAL
MCH RBC QN AUTO: 28.9 PG (ref 26–35)
MCHC RBC AUTO-ENTMCNC: 32.2 % (ref 32–34.5)
MCV RBC AUTO: 89.9 FL (ref 80–99.9)
MONOCYTES ABSOLUTE: 0.28 E9/L (ref 0.1–0.95)
MONOCYTES RELATIVE PERCENT: 5.9 % (ref 2–12)
MUCUS: PRESENT /LPF
NEGATIVE QC PASS/FAIL: NORMAL
NEUTROPHILS ABSOLUTE: 3.43 E9/L (ref 1.8–7.3)
NEUTROPHILS RELATIVE PERCENT: 72.4 % (ref 43–80)
NITRITE, URINE: NEGATIVE
PDW BLD-RTO: 13.1 FL (ref 11.5–15)
PH UA: 5.5 (ref 5–9)
PLATELET # BLD: 227 E9/L (ref 130–450)
PMV BLD AUTO: 9.2 FL (ref 7–12)
POSITIVE QC PASS/FAIL: NORMAL
POTASSIUM SERPL-SCNC: 3.5 MMOL/L (ref 3.5–5)
PROTEIN UA: NEGATIVE MG/DL
RBC # BLD: 5.36 E12/L (ref 3.5–5.5)
RBC UA: ABNORMAL /HPF (ref 0–2)
SODIUM BLD-SCNC: 136 MMOL/L (ref 132–146)
SPECIFIC GRAVITY UA: >=1.03 (ref 1–1.03)
TOTAL PROTEIN: 7.1 G/DL (ref 6.4–8.3)
UROBILINOGEN, URINE: 0.2 E.U./DL
WBC # BLD: 4.7 E9/L (ref 4.5–11.5)
WBC UA: ABNORMAL /HPF (ref 0–5)

## 2020-02-13 PROCEDURE — 83690 ASSAY OF LIPASE: CPT

## 2020-02-13 PROCEDURE — 2580000003 HC RX 258: Performed by: PHYSICIAN ASSISTANT

## 2020-02-13 PROCEDURE — 74177 CT ABD & PELVIS W/CONTRAST: CPT

## 2020-02-13 PROCEDURE — 83605 ASSAY OF LACTIC ACID: CPT

## 2020-02-13 PROCEDURE — 85025 COMPLETE CBC W/AUTO DIFF WBC: CPT

## 2020-02-13 PROCEDURE — 96375 TX/PRO/DX INJ NEW DRUG ADDON: CPT

## 2020-02-13 PROCEDURE — 2580000003 HC RX 258: Performed by: RADIOLOGY

## 2020-02-13 PROCEDURE — 6370000000 HC RX 637 (ALT 250 FOR IP): Performed by: PHYSICIAN ASSISTANT

## 2020-02-13 PROCEDURE — 99284 EMERGENCY DEPT VISIT MOD MDM: CPT

## 2020-02-13 PROCEDURE — 6360000004 HC RX CONTRAST MEDICATION: Performed by: RADIOLOGY

## 2020-02-13 PROCEDURE — 81001 URINALYSIS AUTO W/SCOPE: CPT

## 2020-02-13 PROCEDURE — 6360000002 HC RX W HCPCS: Performed by: PHYSICIAN ASSISTANT

## 2020-02-13 PROCEDURE — 96374 THER/PROPH/DIAG INJ IV PUSH: CPT

## 2020-02-13 PROCEDURE — 87088 URINE BACTERIA CULTURE: CPT

## 2020-02-13 PROCEDURE — 80053 COMPREHEN METABOLIC PANEL: CPT

## 2020-02-13 RX ORDER — SODIUM CHLORIDE 0.9 % (FLUSH) 0.9 %
10 SYRINGE (ML) INJECTION PRN
Status: DISCONTINUED | OUTPATIENT
Start: 2020-02-13 | End: 2020-02-13 | Stop reason: HOSPADM

## 2020-02-13 RX ORDER — DICYCLOMINE HYDROCHLORIDE 10 MG/1
20 CAPSULE ORAL ONCE
Status: COMPLETED | OUTPATIENT
Start: 2020-02-13 | End: 2020-02-13

## 2020-02-13 RX ORDER — 0.9 % SODIUM CHLORIDE 0.9 %
1000 INTRAVENOUS SOLUTION INTRAVENOUS ONCE
Status: COMPLETED | OUTPATIENT
Start: 2020-02-13 | End: 2020-02-13

## 2020-02-13 RX ORDER — DICYCLOMINE HYDROCHLORIDE 10 MG/1
20 CAPSULE ORAL
Qty: 15 CAPSULE | Refills: 0 | Status: SHIPPED | OUTPATIENT
Start: 2020-02-13 | End: 2020-08-31

## 2020-02-13 RX ORDER — ONDANSETRON 2 MG/ML
4 INJECTION INTRAMUSCULAR; INTRAVENOUS ONCE
Status: COMPLETED | OUTPATIENT
Start: 2020-02-13 | End: 2020-02-13

## 2020-02-13 RX ORDER — METRONIDAZOLE 500 MG/1
500 TABLET ORAL 2 TIMES DAILY
Qty: 20 TABLET | Refills: 0 | Status: SHIPPED | OUTPATIENT
Start: 2020-02-13 | End: 2020-02-14 | Stop reason: SDUPTHER

## 2020-02-13 RX ORDER — CIPROFLOXACIN 500 MG/1
500 TABLET, FILM COATED ORAL 2 TIMES DAILY
Qty: 20 TABLET | Refills: 0 | Status: SHIPPED | OUTPATIENT
Start: 2020-02-13 | End: 2020-02-23

## 2020-02-13 RX ORDER — ONDANSETRON 4 MG/1
4 TABLET, ORALLY DISINTEGRATING ORAL EVERY 8 HOURS PRN
Qty: 24 TABLET | Refills: 0 | Status: SHIPPED | OUTPATIENT
Start: 2020-02-13 | End: 2020-08-31

## 2020-02-13 RX ORDER — KETOROLAC TROMETHAMINE 30 MG/ML
15 INJECTION, SOLUTION INTRAMUSCULAR; INTRAVENOUS ONCE
Status: COMPLETED | OUTPATIENT
Start: 2020-02-13 | End: 2020-02-13

## 2020-02-13 RX ADMIN — Medication 10 ML: at 14:11

## 2020-02-13 RX ADMIN — ONDANSETRON 4 MG: 2 INJECTION INTRAMUSCULAR; INTRAVENOUS at 12:55

## 2020-02-13 RX ADMIN — KETOROLAC TROMETHAMINE 15 MG: 30 INJECTION, SOLUTION INTRAMUSCULAR; INTRAVENOUS at 12:55

## 2020-02-13 RX ADMIN — DICYCLOMINE HYDROCHLORIDE 20 MG: 10 CAPSULE ORAL at 12:55

## 2020-02-13 RX ADMIN — SODIUM CHLORIDE 1000 ML: 9 INJECTION, SOLUTION INTRAVENOUS at 12:44

## 2020-02-13 RX ADMIN — IOPAMIDOL 110 ML: 755 INJECTION, SOLUTION INTRAVENOUS at 14:11

## 2020-02-13 ASSESSMENT — PAIN SCALES - GENERAL: PAINLEVEL_OUTOF10: 8

## 2020-02-13 NOTE — ED PROVIDER NOTES
[facility]     Department of Emergency Medicine   ED  Provider Note  Admit Date/RoomTime: 2/13/2020 12:03 PM  ED Room: EDIN/EDIN  Chief Complaint   Diarrhea (Started yesterday. [facility]  )    History of Present Illness   Source of history provided by:  patient. History/Exam Limitations: none. Khanh Geller is a 47 y.o. old female who has a past medical history of:   Past Medical History:   Diagnosis Date    Blood in urine     history of    Constipation     Endometriosis     Migraines     Raynaud's disease       presents to the emergency department by private vehicle, for complaints of gradual onset, still present, constant nausea, vomiting & diarrhea which began 1 day(s) prior to arrival.  There has been no similar episodes in the past. She states: That she has been caring for her mother who currently has C. difficile. Stool quality described as watery and profuse. The symptoms are associated with chills, generalized abdominal cramping, and anorexia. Symptoms are aggravated by eating and liquids and relieved by nothing. There has been no additional symptoms of documented fevers, sweats, headache, vertigo, lightheadedness, chest pain, shortness of breath, palpitations hematemesis, hematochezia, melena, or urinary complaints. .    ROS    Pertinent positives and negatives are stated within HPI, all other systems reviewed and are negative. Past Surgical History:   Procedure Laterality Date    ANKLE SURGERY      APPENDECTOMY      CHOLECYSTECTOMY      CYSTOSCOPY      HERNIA REPAIR      LAPAROSCOPY      multiple for endometriosis    TONSILLECTOMY       Social History:  reports that she has never smoked. She has never used smokeless tobacco. She reports that she does not drink alcohol or use drugs.   Family History: family history includes Allergy (Severe) in her father; Cancer in her paternal cousin; Heart Disease in her father and mother; Other in her maternal aunt, maternal grandmother, and mother; Thyroid Disease in her mother. Allergies: Morphine; Oxycodone; Adhesive tape; Alprazolam; Hydrocodone-acetaminophen; Influenza vaccines; Oseltamivir; Oxycontin [oxycodone hcl]; Tamiflu [oseltamivir phosphate]; and Tamiflu  [oseltamivir phosphate]    Physical Exam          ED Triage Vitals [02/13/20 1201]   BP Temp Temp Source Pulse Resp SpO2 Height Weight   (!) 119/56 98.2 °F (36.8 °C) Oral 91 16 100 % -- --      Oxygen Saturation Interpretation: Normal.    Constitutional:  Alert, development consistent with age. HEENT:  NC/NT. Mucus membranes dry. Airway patent. Neck:  Normal ROM. Supple. Respiratory:  Clear to auscultation and breath sounds equal.  CV:  Regular rate and rhythm, normal heart sounds, without pathological murmurs, ectopy, gallops, or rubs. GI:  General Appearance: normal.       Bowel sounds: hyperactive bowel sounds. Distension:  None. Tenderness: mild tenderness is present in the entire abdomen, no rebound tenderness, no guarding. Liver: non-tender. Spleen:  non-tender. Pulsatile Mass: absent. Rectal: (chaperone present during examination). not indicated / deferred. Integument:  Normal turgor. Warm, dry, without visible rash, unless noted elsewhere. Lymphatics: No lymphangitis or adenopathy noted. Neurological:  Oriented. Motor functions intact.     Lab / Imaging Results   (All laboratory and radiology results have been personally reviewed by myself)  Labs:  Results for orders placed or performed during the hospital encounter of 02/13/20   Comprehensive Metabolic Panel   Result Value Ref Range    Sodium 136 132 - 146 mmol/L    Potassium 3.5 3.5 - 5.0 mmol/L    Chloride 101 98 - 107 mmol/L    CO2 25 22 - 29 mmol/L    Anion Gap 10 7 - 16 mmol/L    Glucose 96 74 - 99 mg/dL    BUN 13 6 - 20 mg/dL    CREATININE 0.7 0.5 - 1.0 mg/dL    GFR Non-African American >60 >=60 mL/min/1.73    GFR African American >60     Calcium 9.5 8.6 - 10.2 mg/dL    Total Protein 7.1 6.4 - 8.3 g/dL    Alb 4.8 3.5 - 5.2 g/dL    Total Bilirubin 1.2 0.0 - 1.2 mg/dL    Alkaline Phosphatase 111 (H) 35 - 104 U/L    ALT 16 0 - 32 U/L    AST 20 0 - 31 U/L   CBC Auto Differential   Result Value Ref Range    WBC 4.7 4.5 - 11.5 E9/L    RBC 5.36 3.50 - 5.50 E12/L    Hemoglobin 15.5 11.5 - 15.5 g/dL    Hematocrit 48.2 (H) 34.0 - 48.0 %    MCV 89.9 80.0 - 99.9 fL    MCH 28.9 26.0 - 35.0 pg    MCHC 32.2 32.0 - 34.5 %    RDW 13.1 11.5 - 15.0 fL    Platelets 976 829 - 583 E9/L    MPV 9.2 7.0 - 12.0 fL    Neutrophils % 72.4 43.0 - 80.0 %    Immature Granulocytes % 0.2 0.0 - 5.0 %    Lymphocytes % 19.4 (L) 20.0 - 42.0 %    Monocytes % 5.9 2.0 - 12.0 %    Eosinophils % 1.7 0.0 - 6.0 %    Basophils % 0.4 0.0 - 2.0 %    Neutrophils Absolute 3.43 1.80 - 7.30 E9/L    Immature Granulocytes # 0.01 E9/L    Lymphocytes Absolute 0.92 (L) 1.50 - 4.00 E9/L    Monocytes Absolute 0.28 0.10 - 0.95 E9/L    Eosinophils Absolute 0.08 0.05 - 0.50 E9/L    Basophils Absolute 0.02 0.00 - 0.20 E9/L   Lipase   Result Value Ref Range    Lipase 21 13 - 60 U/L   Urinalysis   Result Value Ref Range    Color, UA Yellow Straw/Yellow    Clarity, UA Clear Clear    Glucose, Ur Negative Negative mg/dL    Bilirubin Urine Negative Negative    Ketones, Urine Negative Negative mg/dL    Specific Gravity, UA >=1.030 1.005 - 1.030    Blood, Urine LARGE (A) Negative    pH, UA 5.5 5.0 - 9.0    Protein, UA Negative Negative mg/dL    Urobilinogen, Urine 0.2 <2.0 E.U./dL    Nitrite, Urine Negative Negative    Leukocyte Esterase, Urine Negative Negative   Lactic Acid, Plasma   Result Value Ref Range    Lactic Acid 0.5 0.5 - 2.2 mmol/L   Microscopic Urinalysis   Result Value Ref Range    Mucus, UA Present (A) None Seen /LPF    WBC, UA NONE 0 - 5 /HPF    RBC, UA 2-5 0 - 2 /HPF    Bacteria, UA NONE SEEN None Seen /HPF   POC Pregnancy Urine Qual   Result Value Ref Range HCG, Urine, POC Negative Negative    Lot Number [id number]     Positive QC Pass/Fail Pass     Negative QC Pass/Fail Pass      Imaging: All Radiology results interpreted by Radiologist unless otherwise noted. CT ABDOMEN PELVIS W IV CONTRAST Additional Contrast? None   Final Result         1. Liquid stool in the colon indicating diarrhea. No bowel wall   thickening or obstruction. 2. Small subserosal fibroid uterus. 3. Tiny renal cysts. 4. Transitional S1 vertebra. ED Course / Medical Decision Making     Medications   0.9 % sodium chloride bolus (0 mLs Intravenous Stopped 2/13/20 1330)   ondansetron (ZOFRAN) injection 4 mg (4 mg Intravenous Given 2/13/20 1255)   ketorolac (TORADOL) injection 15 mg (15 mg Intravenous Given 2/13/20 1255)   dicyclomine (BENTYL) capsule 20 mg (20 mg Oral Given 2/13/20 1255)   iopamidol (ISOVUE-370) 76 % injection 110 mL (110 mLs Intravenous Given 2/13/20 1411)      Re-examination:  2/13/20       Time: 1500   Patient's symptoms are improving. Results discussed. Consult(s):   none. Procedure(s):   none    MDM:   Patient presents to the emergency department for nausea, vomiting, and diarrhea, and generalized abdominal pain. Patient had improvement of her symptoms with the medications above. Due to her history of recent exposure to C. difficile, she will be given the prescriptions below. Vital signs stable. All lab work was reviewed and discussed with the patient. She has been encouraged to follow-up with her primary care provider, as needed. Specific conditions for emergent return have been discussed and the patient verbalized understanding to return immediately for new or worsening symptoms. Counseling: The emergency provider has spoken with the patient and discussed todays results, in addition to providing specific details for the plan of care and counseling regarding the diagnosis and prognosis.   Questions are answered at this time and they are agreeable with the plan. Assessment     1. Nausea vomiting and diarrhea    2. Generalized abdominal pain       Plan   Discharge to home  Patient condition is good    New Medications     Discharge Medication List as of 2/13/2020  3:10 PM      START taking these medications    Details   ciprofloxacin (CIPRO) 500 MG tablet Take 1 tablet by mouth 2 times daily for 10 days, Disp-20 tablet, R-0Print      metroNIDAZOLE (FLAGYL) 500 MG tablet Take 1 tablet by mouth 2 times daily for 10 days, Disp-20 tablet, R-0Print      ondansetron (ZOFRAN ODT) 4 MG disintegrating tablet Take 1 tablet by mouth every 8 hours as needed for Nausea or Vomiting, Disp-24 tablet, R-0Print      dicyclomine (BENTYL) 10 MG capsule Take 2 capsules by mouth 4 times daily (before meals and nightly), Disp-15 capsule, R-nonePrint           Electronically signed by Raquel Mata PA-C   DD: 2/13/20  **This report was transcribed using voice recognition software. Every effort was made to ensure accuracy; however, inadvertent computerized transcription errors may be present.   END OF ED PROVIDER NOTE        Raquel Mata PA-C  02/13/20 0540

## 2020-02-13 NOTE — TELEPHONE ENCOUNTER
Pt called and is very upset as she went to [facility] and stated they did not do anything for her. She wants to speak to  personally.  Please advise

## 2020-02-13 NOTE — TELEPHONE ENCOUNTER
That amt of diarrhea and sxs definitely warrants er for IVF, BW , CXS, possible imaging. At a minimum needs seen here. If clinically looks bad perhaps she would be talked into ER in person. So at a minimum, express care for eval, but ER best..

## 2020-02-13 NOTE — ED NOTES
Patient discharged with belongings. Discussed care instructions, follow-up instructions, medications and when to return to the hospital. Patient verbalizes understanding and has no further questions at this time. Electronically signed by Christophe Keys RN on 2/13/2020 at 3:42 PM       Ranjan Saravia RN  02/13/20 7791

## 2020-02-14 RX ORDER — METRONIDAZOLE 500 MG/1
500 TABLET ORAL 3 TIMES DAILY
Qty: 30 TABLET | Refills: 0 | Status: SHIPPED | OUTPATIENT
Start: 2020-02-14 | End: 2020-02-24

## 2020-02-14 NOTE — TELEPHONE ENCOUNTER
I called the patient. She was seen in the emergency room. They did several labs, urinalysis. There was blood but she states there is always blood. They did a CT of the abdomen and pelvis. They gave her a bolus of IV fluids. They sent her home with Cipro Flagyl Bentyl and Zofran. Her concerns that she still feels very weak and very dehydrated. She felt like they needed to give her more IV fluids. She needs to go back to the ER she is still that weak and dehydrated for fluids and discussed the potential seriousness of C. difficile, discussed recommendations for hospitalization and risk of serious complications. She needs follow-up with me ASAP as well. Her labs are relatively unremarkable except for the blood in the urine and high specific gravity. CAT scan showed liquid stool in the colon indicating diarrhea, but no bowel wall thickening or obstruction, there was a fibroid in the uterus that she needs to see gynecology, tiny renal cysts, transitional S1 vertebrae. They sent her home with Cipro 500 twice a day for 10 days. We did discuss the importance of diverticulitis but not if C. difficile. She was exposed to C. difficile as her mom was hospitalized with a severe case. We counseled on the risk benefits of Zofran and Bentyl, she was placed on Flagyl twice a day for 10 days and we recommended that if we use Flagyl it should be 3 times a day for 10 days but that this is not first-line therapy anymore, preference would be vancomycin or fidaxomicin. She is worried about cost and wants to stay with the Flagyl but would like me to call in for 3 times a day for 10 days, absolute contraindication with alcohol. Counseled on probiotics. I did agree to put in stool cultures but again needs to follow-up with me ASAP, I am running express care today and offered for her to walk-in today so that I can at least evaluate her.   However I also advised that she go to the ER because I feel she needs IV fluids based on what she is telling me and probably needs level of care outside we can provide here given how illl she is and how severe she describes her symptoms. Risk of serious complications and negative outcome reviewed    She asked at least call in the Flagyl, the stool cultures.   However she says she will seriously consider the ER and at a minimum should follow-up here today as well as make a follow-up appoint with me ASAP    Risk of phone medicine reviewed

## 2020-02-15 LAB — URINE CULTURE, ROUTINE: NORMAL

## 2020-02-19 ENCOUNTER — HOSPITAL ENCOUNTER (EMERGENCY)
Age: 50
Discharge: HOME | End: 2020-02-19
Attending: EMERGENCY MEDICINE
Payer: COMMERCIAL

## 2020-02-19 ENCOUNTER — HOSPITAL ENCOUNTER (OUTPATIENT)
Age: 50
Discharge: HOME | End: 2020-02-21
Payer: COMMERCIAL

## 2020-02-19 ENCOUNTER — OFFICE VISIT (OUTPATIENT)
Dept: FAMILY MEDICINE CLINIC | Age: 50
End: 2020-02-19
Payer: COMMERCIAL

## 2020-02-19 ENCOUNTER — APPOINTMENT (OUTPATIENT)
Dept: CT IMAGING | Age: 50
End: 2020-02-19
Payer: COMMERCIAL

## 2020-02-19 VITALS
HEIGHT: 66 IN | DIASTOLIC BLOOD PRESSURE: 70 MMHG | OXYGEN SATURATION: 99 % | BODY MASS INDEX: 27.97 KG/M2 | HEART RATE: 80 BPM | WEIGHT: 174 LBS | SYSTOLIC BLOOD PRESSURE: 122 MMHG | RESPIRATION RATE: 16 BRPM | TEMPERATURE: 98.7 F

## 2020-02-19 VITALS
DIASTOLIC BLOOD PRESSURE: 64 MMHG | HEART RATE: 76 BPM | OXYGEN SATURATION: 100 % | HEIGHT: 65 IN | BODY MASS INDEX: 28.99 KG/M2 | TEMPERATURE: 98.6 F | SYSTOLIC BLOOD PRESSURE: 118 MMHG | RESPIRATION RATE: 18 BRPM | WEIGHT: 174 LBS

## 2020-02-19 LAB
ALBUMIN SERPL-MCNC: 4.5 G/DL (ref 3.5–5.2)
ALP BLD-CCNC: 90 U/L (ref 35–104)
ALT SERPL-CCNC: 75 U/L (ref 0–32)
ANION GAP SERPL CALCULATED.3IONS-SCNC: 11 MMOL/L (ref 7–16)
AST SERPL-CCNC: 113 U/L (ref 0–31)
ATYPICAL LYMPHOCYTE RELATIVE PERCENT: 13.1 % (ref 0–4)
BACTERIA: ABNORMAL /HPF
BASOPHILS ABSOLUTE: 0 E9/L (ref 0–0.2)
BASOPHILS RELATIVE PERCENT: 0 % (ref 0–2)
BILIRUB SERPL-MCNC: 0.5 MG/DL (ref 0–1.2)
BILIRUBIN URINE: NEGATIVE
BILIRUBIN, POC: ABNORMAL
BLOOD URINE, POC: ABNORMAL
BLOOD, URINE: ABNORMAL
BUN BLDV-MCNC: 7 MG/DL (ref 6–20)
CALCIUM SERPL-MCNC: 9 MG/DL (ref 8.6–10.2)
CHLORIDE BLD-SCNC: 102 MMOL/L (ref 98–107)
CLARITY, POC: ABNORMAL
CLARITY: CLEAR
CO2: 27 MMOL/L (ref 22–29)
COLOR, POC: YELLOW
COLOR: YELLOW
CREAT SERPL-MCNC: 0.7 MG/DL (ref 0.5–1)
EOSINOPHILS ABSOLUTE: 0.1 E9/L (ref 0.05–0.5)
EOSINOPHILS RELATIVE PERCENT: 1.7 % (ref 0–6)
GFR AFRICAN AMERICAN: >60
GFR NON-AFRICAN AMERICAN: >60 ML/MIN/1.73
GLUCOSE BLD-MCNC: 90 MG/DL (ref 74–99)
GLUCOSE URINE, POC: ABNORMAL
GLUCOSE URINE: NEGATIVE MG/DL
HCT VFR BLD CALC: 41.3 % (ref 34–48)
HEMOGLOBIN: 13.6 G/DL (ref 11.5–15.5)
KETONES, POC: ABNORMAL
KETONES, URINE: NEGATIVE MG/DL
LACTIC ACID: 0.8 MMOL/L (ref 0.5–2.2)
LEUKOCYTE EST, POC: ABNORMAL
LEUKOCYTE ESTERASE, URINE: NEGATIVE
LIPASE: 27 U/L (ref 13–60)
LYMPHOCYTES ABSOLUTE: 2.17 E9/L (ref 1.5–4)
LYMPHOCYTES RELATIVE PERCENT: 25.2 % (ref 20–42)
MCH RBC QN AUTO: 29.1 PG (ref 26–35)
MCHC RBC AUTO-ENTMCNC: 32.9 % (ref 32–34.5)
MCV RBC AUTO: 88.4 FL (ref 80–99.9)
MONOCYTES ABSOLUTE: 0.28 E9/L (ref 0.1–0.95)
MONOCYTES RELATIVE PERCENT: 5.2 % (ref 2–12)
NEUTROPHILS ABSOLUTE: 3.14 E9/L (ref 1.8–7.3)
NEUTROPHILS RELATIVE PERCENT: 54.8 % (ref 43–80)
NITRITE, POC: ABNORMAL
NITRITE, URINE: NEGATIVE
NUCLEATED RED BLOOD CELLS: 0 /100 WBC
PDW BLD-RTO: 13 FL (ref 11.5–15)
PH UA: 6.5 (ref 5–9)
PH, POC: 5.5
PLATELET # BLD: 226 E9/L (ref 130–450)
PMV BLD AUTO: 9.2 FL (ref 7–12)
POTASSIUM SERPL-SCNC: 3.4 MMOL/L (ref 3.5–5)
PROTEIN UA: NEGATIVE MG/DL
PROTEIN, POC: ABNORMAL
RBC # BLD: 4.67 E12/L (ref 3.5–5.5)
RBC # BLD: NORMAL 10*6/UL
RBC UA: ABNORMAL /HPF (ref 0–2)
SODIUM BLD-SCNC: 140 MMOL/L (ref 132–146)
SPECIFIC GRAVITY UA: <=1.005 (ref 1–1.03)
SPECIFIC GRAVITY, POC: 1.03
TOTAL PROTEIN: 6.7 G/DL (ref 6.4–8.3)
UROBILINOGEN, POC: 0.2
UROBILINOGEN, URINE: 0.2 E.U./DL
WBC # BLD: 5.7 E9/L (ref 4.5–11.5)
WBC UA: ABNORMAL /HPF (ref 0–5)

## 2020-02-19 PROCEDURE — 6360000004 HC RX CONTRAST MEDICATION: Performed by: RADIOLOGY

## 2020-02-19 PROCEDURE — 83690 ASSAY OF LIPASE: CPT

## 2020-02-19 PROCEDURE — 80053 COMPREHEN METABOLIC PANEL: CPT

## 2020-02-19 PROCEDURE — 96360 HYDRATION IV INFUSION INIT: CPT

## 2020-02-19 PROCEDURE — 87088 URINE BACTERIA CULTURE: CPT

## 2020-02-19 PROCEDURE — 6370000000 HC RX 637 (ALT 250 FOR IP): Performed by: STUDENT IN AN ORGANIZED HEALTH CARE EDUCATION/TRAINING PROGRAM

## 2020-02-19 PROCEDURE — 74177 CT ABD & PELVIS W/CONTRAST: CPT

## 2020-02-19 PROCEDURE — 2580000003 HC RX 258: Performed by: STUDENT IN AN ORGANIZED HEALTH CARE EDUCATION/TRAINING PROGRAM

## 2020-02-19 PROCEDURE — 83605 ASSAY OF LACTIC ACID: CPT

## 2020-02-19 PROCEDURE — 81003 URINALYSIS AUTO W/O SCOPE: CPT | Performed by: FAMILY MEDICINE

## 2020-02-19 PROCEDURE — 81001 URINALYSIS AUTO W/SCOPE: CPT

## 2020-02-19 PROCEDURE — 99213 OFFICE O/P EST LOW 20 MIN: CPT | Performed by: FAMILY MEDICINE

## 2020-02-19 PROCEDURE — 99284 EMERGENCY DEPT VISIT MOD MDM: CPT

## 2020-02-19 PROCEDURE — 85025 COMPLETE CBC W/AUTO DIFF WBC: CPT

## 2020-02-19 PROCEDURE — 96361 HYDRATE IV INFUSION ADD-ON: CPT

## 2020-02-19 RX ORDER — POTASSIUM CHLORIDE 20 MEQ/1
40 TABLET, EXTENDED RELEASE ORAL ONCE
Status: COMPLETED | OUTPATIENT
Start: 2020-02-19 | End: 2020-02-19

## 2020-02-19 RX ORDER — 0.9 % SODIUM CHLORIDE 0.9 %
1000 INTRAVENOUS SOLUTION INTRAVENOUS ONCE
Status: COMPLETED | OUTPATIENT
Start: 2020-02-19 | End: 2020-02-19

## 2020-02-19 RX ADMIN — IOPAMIDOL 110 ML: 755 INJECTION, SOLUTION INTRAVENOUS at 20:37

## 2020-02-19 RX ADMIN — SODIUM CHLORIDE 1000 ML: 9 INJECTION, SOLUTION INTRAVENOUS at 19:55

## 2020-02-19 RX ADMIN — POTASSIUM CHLORIDE 40 MEQ: 20 TABLET, EXTENDED RELEASE ORAL at 22:05

## 2020-02-19 ASSESSMENT — PAIN SCALES - GENERAL
PAINLEVEL_OUTOF10: 0
PAINLEVEL_OUTOF10: 5

## 2020-02-19 ASSESSMENT — PAIN DESCRIPTION - PAIN TYPE: TYPE: ACUTE PAIN

## 2020-02-19 ASSESSMENT — PAIN DESCRIPTION - LOCATION: LOCATION: ABDOMEN

## 2020-02-19 NOTE — PROGRESS NOTES
2020     [facility]  Horace Jamison (:  1970) is a 47 y.o. female, here for evaluation of the following medical concerns:    Patient presents today for multiple complaints. Patient reports that recently she hasn't been taking care of her mother who has C. diff. She started to exhibit symptoms similar to such. She was seen in the emergency room for such. She is placed on antibiotics. Recently she had an increase in the dosage of her antibiotics. With such she started to develop what in her urine and at times brown tinges to her urine. Patient has continued to have ongoing diarrhea. Patient reports that she did not have a C. diff lab checked when she was in the emergency department. Patient has been unable to sleep for the last 2 days due to her symptoms. Patient does have reported abdominal pain and right-sided flank pain. Review of Systems  As noted above. Past Medical History:   Diagnosis Date    Blood in urine     history of    Constipation     Endometriosis     Migraines     Raynaud's disease        Prior to Visit Medications    Medication Sig Taking? Authorizing Provider   metroNIDAZOLE (FLAGYL) 500 MG tablet Take 1 tablet by mouth 3 times daily for 10 days Yes Bijal Davies MD   ciprofloxacin (CIPRO) 500 MG tablet Take 1 tablet by mouth 2 times daily for 10 days Yes Shari Cárdenas PA-C   ondansetron (ZOFRAN ODT) 4 MG disintegrating tablet Take 1 tablet by mouth every 8 hours as needed for Nausea or Vomiting Yes Shari Cárdenas PA-C   dicyclomine (BENTYL) 10 MG capsule Take 2 capsules by mouth 4 times daily (before meals and nightly) Yes Shari Cárdenas PA-C        Allergies   Allergen Reactions    Morphine      Reaction is reddened skin and patient \"blacks out\"    Oxycodone Anxiety    Adhesive Tape      Reaction is sloughing of skin    Alprazolam      Reaction is hallucinations    Hydrocodone-Acetaminophen     Influenza Vaccines      ?  Neuro issue and neurologist said not to get again  Oseltamivir     Oxycontin [Oxycodone Hcl]      Reaction is reddened skin and patient \"blacks out\"    Tamiflu [Oseltamivir Phosphate]      Reaction is hallucinations    Tamiflu  [Oseltamivir Phosphate] Nausea And Vomiting       Social History     Tobacco Use    Smoking status: Never Smoker    Smokeless tobacco: Never Used   Substance Use Topics    Alcohol use: No     Frequency: Never           Vitals:    02/19/20 1639   BP: 122/70   Pulse: 80   Resp: 16   Temp: 98.7 °F (37.1 °C)   SpO2: 99%   Weight: 174 lb (78.9 kg)   Height: 5' 6\" (1.676 m)     Estimated body mass index is 28.08 kg/m² as calculated from the following:    Height as of this encounter: 5' 6\" (1.676 m). Weight as of this encounter: 174 lb (78.9 kg). Physical Exam  Constitutional:       Appearance: She is ill-appearing. HENT:      Head: Normocephalic. Eyes:      Extraocular Movements: Extraocular movements intact. Pulmonary:      Effort: Pulmonary effort is normal.   Neurological:      Mental Status: She is alert. Psychiatric:         Mood and Affect: Mood normal.         ASSESSMENT/PLAN:  Maegan Winters was seen today for hematuria and diarrhea. Diagnoses and all orders for this visit:    Urinary tract infection with hematuria, site unspecified  -     POCT Urinalysis No Micro (Auto)  -     URINE CULTURE; Future    Patient to ER now. Concern for possible renal failure in light of ongoing C. diff infection and antibiotics. Return in about 1 week (around 2/26/2020), or if symptoms worsen or fail to improve. An electronicsignature was used to authenticate this note.     --Claudeen Commander, MD on 2/19/20 at 4:55 PM

## 2020-02-20 ENCOUNTER — TELEPHONE (OUTPATIENT)
Dept: PRIMARY CARE CLINIC | Age: 50
End: 2020-02-20

## 2020-02-20 ASSESSMENT — ENCOUNTER SYMPTOMS
SHORTNESS OF BREATH: 0
BLOOD IN STOOL: 0
BACK PAIN: 1
ABDOMINAL PAIN: 1
DIARRHEA: 1
ABDOMINAL DISTENTION: 0
VOMITING: 0
RHINORRHEA: 0
CONSTIPATION: 0
NAUSEA: 0

## 2020-02-20 NOTE — TELEPHONE ENCOUNTER
The pt is calling because she was in the ED last night with viral gastroenteritis and they told her to call and get an appointment with her doctor and the soonest she could get in was 03/23/20, do you want to squeeze this pt in earlier or is this date okay

## 2020-02-20 NOTE — ED PROVIDER NOTES
Patient is a 79-year-old female comes to the emergency department complaining of abdominal pain. Patient states she was seen on 2/13 for similar complaint. Patient states she has at home with her mother who is currently being treated for C. difficile and she developed intermittent diarrhea starting last Thursday. Patient states she had multiple episodes of watery diarrhea on Thursday and Saturday and then she has had 8-10 episodes of watery diarrhea every day since Sunday. Patient denies blood or pus in the stool. Patient also endorses gross hematuria that she has had every day. She has flank pain and right lower back pain since Sunday as well. Patient denies chest pain, shortness of breath, headache, nausea, vomiting, vertigo, trauma, syncope, dizziness, travel. Patient states abdominal pain is epigastric and radiates around to the right side into the back. Pain is cramping, 7/10, constant, increases with diarrhea, nothing makes it better. Patient does have a history of kidney stones. Patient denies fever chills. Review of Systems   Constitutional: Negative for appetite change, chills and fever. HENT: Negative for congestion and rhinorrhea. Eyes: Negative for visual disturbance. Respiratory: Negative for shortness of breath. Cardiovascular: Negative for chest pain and palpitations. Gastrointestinal: Positive for abdominal pain and diarrhea. Negative for abdominal distention, blood in stool, constipation, nausea and vomiting. Genitourinary: Positive for hematuria. Negative for dysuria, frequency and urgency. Musculoskeletal: Positive for back pain. Negative for arthralgias and myalgias. Skin: Negative for rash. Neurological: Negative for dizziness, syncope, weakness, light-headedness and headaches. Psychiatric/Behavioral: The patient is not nervous/anxious. All other systems reviewed and are negative. Physical Exam  Vitals signs reviewed.    Constitutional: scans discussed with patient and reassurance provided. Patient stable for discharge home to follow-up with PCP, patient is agreeable to plan and advised of risks. Patient voiced understanding when to return to the emergency department. All questions were answered. --------------------------------------------- PAST HISTORY ---------------------------------------------  Past Medical History:  has a past medical history of Blood in urine, Constipation, Endometriosis, Migraines, and Raynaud's disease. Past Surgical History:  has a past surgical history that includes Appendectomy; hernia repair; Ankle surgery; Cholecystectomy; Tonsillectomy; laparoscopy; and Cystoscopy. Social History:  reports that she has never smoked. She has never used smokeless tobacco. She reports that she does not drink alcohol or use drugs. Family History: family history includes Allergy (Severe) in her father; Cancer in her paternal cousin; Heart Disease in her father and mother; Other in her maternal aunt, maternal grandmother, and mother; Thyroid Disease in her mother. The patients home medications have been reviewed. Allergies: Morphine; Oxycodone; Adhesive tape; Alprazolam; Hydrocodone-acetaminophen; Influenza vaccines; Oseltamivir; Oxycontin [oxycodone hcl];  Tamiflu [oseltamivir phosphate]; and Tamiflu  [oseltamivir phosphate]    -------------------------------------------------- RESULTS -------------------------------------------------  Labs:  Results for orders placed or performed during the hospital encounter of 02/19/20   CBC Auto Differential   Result Value Ref Range    WBC 5.7 4.5 - 11.5 E9/L    RBC 4.67 3.50 - 5.50 E12/L    Hemoglobin 13.6 11.5 - 15.5 g/dL    Hematocrit 41.3 34.0 - 48.0 %    MCV 88.4 80.0 - 99.9 fL    MCH 29.1 26.0 - 35.0 pg    MCHC 32.9 32.0 - 34.5 %    RDW 13.0 11.5 - 15.0 fL    Platelets 655 261 - 188 E9/L    MPV 9.2 7.0 - 12.0 fL    Neutrophils % 54.8 43.0 - 80.0 %    Lymphocytes % 25.2 ------------------------- NURSING NOTES AND VITALS REVIEWED ---------------------------  Date / Time Roomed:  2/19/2020  6:09 PM  ED Bed Assignment:  15/15    The nursing notes within the ED encounter and vital signs as below have been reviewed. /64   Pulse 76   Temp 98.6 °F (37 °C) (Oral)   Resp 18   Ht 5' 5\" (1.651 m)   Wt 174 lb (78.9 kg)   SpO2 100%   BMI 28.96 kg/m²   Oxygen Saturation Interpretation: Normal      ------------------------------------------ PROGRESS NOTES ------------------------------------------  9:20 PM  I have spoken with the patient and discussed todays results, in addition to providing specific details for the plan of care and counseling regarding the diagnosis and prognosis. Their questions are answered at this time and they are agreeable with the plan. I discussed at length with them reasons for immediate return here for re evaluation. They will followup with their primary care physician by calling their office tomorrow. --------------------------------- ADDITIONAL PROVIDER NOTES ---------------------------------  At this time the patient is without objective evidence of an acute process requiring hospitalization or inpatient management. They have remained hemodynamically stable throughout their entire ED visit and are stable for discharge with outpatient follow-up. The plan has been discussed in detail and they are aware of the specific conditions for emergent return, as well as the importance of follow-up. Discharge Medication List as of 2/19/2020  9:18 PM          Diagnosis:  1. Viral gastroenteritis        Disposition:  Patient's disposition: Discharge to home  Patient's condition is stable.     2/19/20, 9:20 PM.    This note is prepared by Gilberto Chauhan MD -[id number]       Gilberto Chauhan MD  Resident  02/20/20 0118

## 2020-02-21 NOTE — TELEPHONE ENCOUNTER
Pt calling back. She did not take the Flagyl alone, she states she must have misunderstood your instructions. She did not get stool studies stating the ER told her no need since her WBCs were normal.  She has [employer] which [facility] is no longer contracted with as of March so she will not be able to schedule with you after this month. She is asking if she can be seen before the end of the month.  Per [place] your soonest opening is 3/2

## 2020-02-22 LAB — URINE CULTURE, ROUTINE: NORMAL

## 2020-02-24 NOTE — TELEPHONE ENCOUNTER
Patient notified. Was offered appt 02/26 @ 10:30 but is unable to due to work schedule.  Will call if we have any other cancellations     Unable to do stool cultures as she does not have diarrhea any more

## 2020-03-23 ENCOUNTER — TELEPHONE (OUTPATIENT)
Dept: PRIMARY CARE CLINIC | Age: 50
End: 2020-03-23

## 2020-03-23 NOTE — TELEPHONE ENCOUNTER
lmom for pt to return call regarding appt today. Does she still have sx?  If not will rechedule appt or after 5/26

## 2020-08-11 ENCOUNTER — OFFICE VISIT (OUTPATIENT)
Dept: PRIMARY CARE CLINIC | Age: 50
End: 2020-08-11
Payer: COMMERCIAL

## 2020-08-11 VITALS
BODY MASS INDEX: 30.16 KG/M2 | SYSTOLIC BLOOD PRESSURE: 118 MMHG | HEART RATE: 64 BPM | DIASTOLIC BLOOD PRESSURE: 78 MMHG | RESPIRATION RATE: 16 BRPM | OXYGEN SATURATION: 98 % | HEIGHT: 65 IN | WEIGHT: 181 LBS | TEMPERATURE: 97.6 F

## 2020-08-11 PROCEDURE — 99213 OFFICE O/P EST LOW 20 MIN: CPT | Performed by: FAMILY MEDICINE

## 2020-08-11 RX ORDER — PREDNISONE 10 MG/1
TABLET ORAL
Qty: 21 TABLET | Refills: 0 | Status: SHIPPED
Start: 2020-08-11 | End: 2020-08-31

## 2020-08-11 ASSESSMENT — PATIENT HEALTH QUESTIONNAIRE - PHQ9
2. FEELING DOWN, DEPRESSED OR HOPELESS: 0
SUM OF ALL RESPONSES TO PHQ QUESTIONS 1-9: 0
1. LITTLE INTEREST OR PLEASURE IN DOING THINGS: 0
SUM OF ALL RESPONSES TO PHQ QUESTIONS 1-9: 0
SUM OF ALL RESPONSES TO PHQ9 QUESTIONS 1 & 2: 0

## 2020-08-11 NOTE — PROGRESS NOTES
20  Flo Old : 1970 Sex: female  Age: 47 y.o. Chief Complaint   Patient presents with    Rash     PT THINKS [facility] [address]       HPI  HPI:    Patient was working with poison [employer], states she gets it every year, developed on her arms is itchy, now getting it on her legs. Try to wear gloves etc. but still getting a breakout. Pruritic, no pain no drainage fever chills malaise throat swelling wheezing or otherwise. Does particularly well with prednisone. She asks if I am tired given her the same spiel every year as it happens every year. ROS:  As above      Current Outpatient Medications:     predniSONE (DELTASONE) 10 MG tablet, 4 po qd x 2d, then 3 po qd x2d, then 2 po qd x 2d, then 1 po qd x 2d, then 1/2 po qd x2d, Disp: 21 tablet, Rfl: 0    ondansetron (ZOFRAN ODT) 4 MG disintegrating tablet, Take 1 tablet by mouth every 8 hours as needed for Nausea or Vomiting (Patient not taking: Reported on 2020), Disp: 24 tablet, Rfl: 0    dicyclomine (BENTYL) 10 MG capsule, Take 2 capsules by mouth 4 times daily (before meals and nightly) (Patient not taking: Reported on 2020), Disp: 15 capsule, Rfl: none  Allergies   Allergen Reactions    Morphine      Reaction is reddened skin and patient \"blacks out\"    Oxycodone Anxiety    Tomato Anaphylaxis    Alprazolam      Reaction is hallucinations  hallucinations    Hydrocodone-Acetaminophen      GI upset    Influenza Vaccines      ?  Neuro issue and neurologist said not to get again    Oseltamivir     Oxycontin [Oxycodone Hcl]      Reaction is reddened skin and patient \"blacks out\"    Tamiflu [Oseltamivir Phosphate]      Reaction is hallucinations    Adhesive Tape Rash     Reaction is sloughing of skin  Skin comes off    Oseltamivir Phosphate Nausea And Vomiting       Past Medical History:   Diagnosis Date    Blood in urine     history of    Constipation     Endometriosis     Migraines     Raynaud's disease      Past Surgical History:   Procedure Laterality Date    ANKLE SURGERY      APPENDECTOMY      CHOLECYSTECTOMY      CYSTOSCOPY      HERNIA REPAIR      LAPAROSCOPY      multiple for endometriosis    TONSILLECTOMY       Family History   Problem Relation Age of Onset    Heart Disease Mother     Thyroid Disease Mother     Other Mother         kidney problems    Heart Disease Father     Allergy (Severe) Father         seasonal    Other Maternal Grandmother         brain tumor    Other Maternal Aunt         aneurysm    Cancer Paternal Cousin         ovarian      Social History     Tobacco Use    Smoking status: Never Smoker    Smokeless tobacco: Never Used   Substance Use Topics    Alcohol use: No     Frequency: Never    Drug use: No      Social History     Social History Narrative    PMH:    Medical Problems:    Endometriosis, Raynauds, Migraine    Surgical Hx:    Appendectomy, Removal of Gallbladder    Laparoscopy - Endometriosis    Hernia Repair - Hiatal    Lt ankle -- several surgeries, cartilage removal    Reviewed, no changes. FH:    Father:    Seasonal Allergies, Heart Disease. Mother:    Heart Disease. paternal cousin ovarian cancer in 42's;    maternal aunt berry aneurysm    maternal grandmother brain tumor    Reviewed, no changes. SH:    Marital: Single. Work Status: Full-Time Employment - - [employer]. Personal Habits: Cigarette Use: Nonsmoker. Alcohol: Never used alcohol. Vitals:    08/11/20 1428   BP: 118/78   Pulse: 64   Resp: 16   Temp: 97.6 °F (36.4 °C)   SpO2: 98%   Weight: 181 lb (82.1 kg)   Height: 5' 5\" (1.651 m)     Wt Readings from Last 3 Encounters:   08/11/20 181 lb (82.1 kg)   02/19/20 174 lb (78.9 kg)   02/19/20 174 lb (78.9 kg)        Physical Exam    Exam:  Const: Appears comfortable. No signs of acute distress present. Head/Face: Atraumatic, normocephalic on inspection. Eyes: No discharge from the eyes. Sclerae clear.   ENMT:   Ears clear, nose clear, oropharynx clear. No oral pharyngeal edema  Neck: Supple. Palpation reveals no adenopathy. No masses appreciated. No JVD. Resp: Respirations are unlabored. Clear to auscultation bilaterally. No rales, rhonchi or wheezes appreciated over the  lungs bilaterally. CV: RRR   Extremities: No clubbing or cyanosis. No edema of the lower limbs  bilaterally. No calf inflammation or tenderness. Abdomen: Abdomen is soft, nontender, and nondistended. No abdominal masses  appreciated. No palpable hepatosplenomegaly. Bowel sounds are normoactive. Skin: Linear maculopapular lesions on her right arm and left leg   muscular skeletal: No acute joint inflammation. Neuro:Grossly intact without focal deficit        Assessment and Plan:    1. Rhus dermatitis  Counseled extensively. Differential reviewed, including serious etiologies. Most consistent with this other differential reviewed. Counseled on topicals, antihistamine risk-benefit, preventative measures. She does well with steroids and prescribed prednisone with precautions not to take with NSAIDs. As long as symptoms steadily improve/resolve follow-up 2 weeks sooner as needed      No problem-specific Assessment & Plan notes found for this encounter. Plan as above. Counseled extensively and differential diagnoses relevant to above were reviewed, including serious etiologies. Side effects and interactions of medications were reviewed. As long as symptoms steadily improve/resolve, and medical conditions follow the expected course, FU as below, sooner PRN. No follow-ups on file. Signs and symptoms to watch for discussed, serious signs and symptoms reviewed. ER if any. Camila Virk MD    Patients are advised to check with insurance company to ensure coverage and to fully understand benefits and cost prior to any testing. This note was created with the assistance of voice recognition software.   Document was reviewed however may contain grammatical errors.

## 2020-08-29 ENCOUNTER — OFFICE VISIT (OUTPATIENT)
Dept: FAMILY MEDICINE CLINIC | Age: 50
End: 2020-08-29
Payer: COMMERCIAL

## 2020-08-29 VITALS
DIASTOLIC BLOOD PRESSURE: 64 MMHG | OXYGEN SATURATION: 98 % | TEMPERATURE: 97.4 F | HEART RATE: 63 BPM | WEIGHT: 178 LBS | BODY MASS INDEX: 29.62 KG/M2 | SYSTOLIC BLOOD PRESSURE: 126 MMHG

## 2020-08-29 PROCEDURE — 99213 OFFICE O/P EST LOW 20 MIN: CPT | Performed by: FAMILY MEDICINE

## 2020-08-29 RX ORDER — CEPHALEXIN 500 MG/1
500 CAPSULE ORAL 3 TIMES DAILY
Qty: 21 CAPSULE | Refills: 0 | Status: SHIPPED
Start: 2020-08-29 | End: 2020-08-31

## 2020-08-29 ASSESSMENT — ENCOUNTER SYMPTOMS
WHEEZING: 0
ABDOMINAL PAIN: 0
CHEST TIGHTNESS: 0
BACK PAIN: 0
VOMITING: 0
SHORTNESS OF BREATH: 0
DIARRHEA: 0
COUGH: 0
SINUS PAIN: 0
CONSTIPATION: 0
TROUBLE SWALLOWING: 0
NAUSEA: 0
EYE PAIN: 0
SORE THROAT: 0

## 2020-08-29 NOTE — PROGRESS NOTES
8/29/20    Name: Melba Culp  ASD:[id number]   Sex:female   Age:49 y.o. Chief Complaint   Patient presents with    Knee Pain     R sided x4d      Patient presnets with vein that is hurting on top of right knee  It has been this way for over a week  Not getting any worse and no beter butnot doing anything for it  Called in yesterday and got an appt with pcp for Monday but decided to come in today to get it tlooked at  Has varicose veins in both lower extremities for a long time  She does not think she has ever seen a specialist for it    No calf pain, nothigh pain  No issues walking  Stand on her feet at work  No recnet injuries    Review of Systems   Constitutional: Negative for appetite change, fatigue and fever. HENT: Negative for congestion, ear pain, sinus pain, sore throat and trouble swallowing. Eyes: Negative for pain. Respiratory: Negative for cough, chest tightness, shortness of breath and wheezing. Cardiovascular: Negative for chest pain, palpitations and leg swelling. Gastrointestinal: Negative for abdominal pain, constipation, diarrhea, nausea and vomiting. Endocrine: Negative for cold intolerance and heat intolerance. Genitourinary: Negative for difficulty urinating, hematuria and pelvic pain. Musculoskeletal: Negative for back pain, gait problem and joint swelling. Skin: Negative for rash and wound. Neurological: Positive for headaches. Negative for dizziness and syncope. Hematological: Negative for adenopathy. Psychiatric/Behavioral: Negative for confusion, sleep disturbance and suicidal ideas.            Current Outpatient Medications:     cephALEXin (KEFLEX) 500 MG capsule, Take 1 capsule by mouth 3 times daily for 7 days, Disp: 21 capsule, Rfl: 0    predniSONE (DELTASONE) 10 MG tablet, 4 po qd x 2d, then 3 po qd x2d, then 2 po qd x 2d, then 1 po qd x 2d, then 1/2 po qd x2d, Disp: 21 tablet, Rfl: 0    ondansetron (ZOFRAN ODT) 4 MG disintegrating tablet, Take 1 tablet by mouth every 8 hours as needed for Nausea or Vomiting (Patient not taking: Reported on 8/11/2020), Disp: 24 tablet, Rfl: 0    dicyclomine (BENTYL) 10 MG capsule, Take 2 capsules by mouth 4 times daily (before meals and nightly) (Patient not taking: Reported on 8/11/2020), Disp: 15 capsule, Rfl: none  Allergies   Allergen Reactions    Morphine      Reaction is reddened skin and patient \"blacks out\"    Oxycodone Anxiety    Tomato Anaphylaxis    Alprazolam      Reaction is hallucinations  hallucinations    Hydrocodone-Acetaminophen      GI upset    Influenza Vaccines      ?  Neuro issue and neurologist said not to get again    Oseltamivir     Oxycontin [Oxycodone Hcl]      Reaction is reddened skin and patient \"blacks out\"    Tamiflu [Oseltamivir Phosphate]      Reaction is hallucinations    Adhesive Tape Rash     Reaction is sloughing of skin  Skin comes off    Oseltamivir Phosphate Nausea And Vomiting      Past Medical History:   Diagnosis Date    Blood in urine     history of    Constipation     Endometriosis     Migraines     Raynaud's disease      Patient Active Problem List    Diagnosis Date Noted    Splinter in skin 11/25/2019    Salpingitis of left eustachian tube 11/25/2019    Hematuria 10/09/2019    Weight gain 08/15/2019    Overweight (BMI 25.0-29.9) 08/15/2019    Allergic rhinitis 08/15/2019    Migraine 08/15/2019    Acute pain of both knees 08/15/2019    Nontraumatic incomplete tear of right rotator cuff 08/15/2019    Pituitary neoplasm 08/15/2019      Past Surgical History:   Procedure Laterality Date    ANKLE SURGERY      APPENDECTOMY      CHOLECYSTECTOMY      CYSTOSCOPY      HERNIA REPAIR      LAPAROSCOPY      multiple for endometriosis    TONSILLECTOMY        Social History     Tobacco History     Smoking Status  Never Smoker    Smokeless Tobacco Use  Never Used          Alcohol History     Alcohol Use Status  No          Drug Use     Drug Use Status  No Sexual Activity     Sexually Active  Not Asked                  /64   Pulse 63   Temp 97.4 °F (36.3 °C)   Wt 178 lb (80.7 kg)   SpO2 98%   BMI 29.62 kg/m²     EXAM:   Physical Exam  Vitals signs and nursing note reviewed. Constitutional:       Appearance: Normal appearance. She is well-developed. HENT:      Head: Normocephalic and atraumatic. Nose: Nose normal.      Mouth/Throat:      Mouth: Mucous membranes are moist.   Eyes:      Pupils: Pupils are equal, round, and reactive to light. Neck:      Musculoskeletal: Normal range of motion. Cardiovascular:      Rate and Rhythm: Normal rate and regular rhythm. Pulmonary:      Effort: Pulmonary effort is normal.      Breath sounds: Normal breath sounds. Abdominal:      General: Bowel sounds are normal.      Palpations: Abdomen is soft. Musculoskeletal:      Comments: Gait normal in the office no pain in popliteal area bilaterally no thigh pain and no groin pain  No edema in either lower extremity, varicose veins present in both lower extremities   Skin:     General: Skin is warm and dry. Comments: Right knee just medial to patella there is a varicose vein that is clotted, superficial thrombophelbitis, but not red, it is tender, area is bout the size of a nickel, no other hard areas on thight or lower leg   Neurological:      General: No focal deficit present. Mental Status: She is alert and oriented to person, place, and time. Comments: Getting a migraine per the patient so she was asking questions multiple times b/c she could not remember   Psychiatric:         Mood and Affect: Mood normal.         Thought Content: Thought content normal.          Yobani Gee was seen today for knee pain.     Diagnoses and all orders for this visit:    Thrombophlebitis of leg, right, superficial  Comments:  at the knee  heat / hot compresses  asa 81mg dialy x 1 week  keflex x 1 week  keep appt monday with [facility]      Other orders  -     cephALEXin (KEFLEX) 500 MG capsule; Take 1 capsule by mouth 3 times daily for 7 days    suspicion very low for DVT at this time, she is completely asymptomatic  Recommend she f/u in2 days and if her right knee is worsening then may want to consider US  She perkins have extensive varicose veins she may need referral to vascular in the future        Seen by:   Henry Marie DO

## 2020-08-31 ENCOUNTER — TELEPHONE (OUTPATIENT)
Dept: PRIMARY CARE CLINIC | Age: 50
End: 2020-08-31

## 2020-08-31 ENCOUNTER — OFFICE VISIT (OUTPATIENT)
Dept: PRIMARY CARE CLINIC | Age: 50
End: 2020-08-31
Payer: COMMERCIAL

## 2020-08-31 VITALS
SYSTOLIC BLOOD PRESSURE: 122 MMHG | HEART RATE: 71 BPM | OXYGEN SATURATION: 94 % | WEIGHT: 179 LBS | DIASTOLIC BLOOD PRESSURE: 64 MMHG | BODY MASS INDEX: 29.79 KG/M2 | TEMPERATURE: 96.7 F

## 2020-08-31 PROBLEM — M79.604 PAIN IN RIGHT LEG: Status: ACTIVE | Noted: 2020-08-31

## 2020-08-31 PROBLEM — M70.41 PREPATELLAR BURSITIS OF RIGHT KNEE: Status: ACTIVE | Noted: 2020-08-31

## 2020-08-31 PROBLEM — I83.11 VARICOSE VEINS OF BOTH LOWER EXTREMITIES WITH INFLAMMATION: Status: ACTIVE | Noted: 2020-08-31

## 2020-08-31 PROBLEM — I83.12 VARICOSE VEINS OF BOTH LOWER EXTREMITIES WITH INFLAMMATION: Status: ACTIVE | Noted: 2020-08-31

## 2020-08-31 PROCEDURE — 99214 OFFICE O/P EST MOD 30 MIN: CPT | Performed by: FAMILY MEDICINE

## 2020-08-31 RX ORDER — PREDNISONE 10 MG/1
TABLET ORAL
Qty: 12 TABLET | Refills: 0 | Status: SHIPPED | OUTPATIENT
Start: 2020-08-31 | End: 2020-09-06

## 2020-08-31 NOTE — ASSESSMENT & PLAN NOTE
May try nasal saline, nasal steroid daily, Allegra or equivalent daily as needed.  Counseled on singular

## 2020-08-31 NOTE — ASSESSMENT & PLAN NOTE
Counseled extensively. Differential reviewed, including serious etiologies. See above. Check x-ray. Check CBC uric acid. Prednisone taper precautions not to take with NSAIDs.

## 2020-08-31 NOTE — ASSESSMENT & PLAN NOTE
Counseled extensively. Differential reviewed, including serious etiologies. Check ultrasound. Counseled on various aspirin therapies, anticoagulation, counseled on leg elevation low-salt. Declines referral.  Recommended compression stockings thigh-high since the varicose veins affecting her entire leg and she would like a prescription.

## 2020-08-31 NOTE — PROGRESS NOTES
20  Jordan Chin : 1970 Sex: female  Age: 47 y.o. Chief Complaint   Patient presents with    Leg Pain     right x 1 week        HPI  HPI:    Patient presents today for follow-up of [facility] care Saturday, last week developed some pain and swelling of the right knee. She has chronic varicose veins. She worried about a blood clot. She is been on baby aspirin a day for a week and cephalexin. Symptoms are much better. Swelling and redness resolved. Still some discomfort if she stands for long periods of time. Feels more superficial.  No fever chills malaise. No headache dizziness chest pain shortness of breath or otherwise. Complains of seasonal allergies      Review of Systems  ROS:  Const: Denies chills, fever, malaise and sweats. Eyes: Denies discharge, pain, redness and visual disturbance. ENMT: Denies earaches, other ear symptoms. Denies nasal or sinus symptoms other than stated  above. Denies mouth and tongue lesions and sore throat. CV: Denies chest discomfort, pain; diaphoresis, dizziness, edema, lightheadedness, orthopnea,  palpitations, syncope and near syncopal episode or any exertional symptoms  Resp: Denies cough, hemoptysis, pleuritic pain, SOB, sputum production and wheezing. GI: Denies abdominal pain, change in bowel habits, hematochezia, melena, nausea and vomiting. : Denies urinary symptoms including dysuria , urgency, frequency or hematuria. Musculo: As above   skin: Denies bruising and rash. Neuro: Denies headache, numbness, stiff neck, tingling and focal weakness slurred speech or facial  droop  Hema/Lymph: Denies bleeding/bruising tendency and enlarged lymph nodes        Current Outpatient Medications:     predniSONE (DELTASONE) 10 MG tablet, Take 3 tablets by mouth daily for 2 days, THEN 2 tablets daily for 2 days, THEN 1 tablet daily for 2 days. , Disp: 12 tablet, Rfl: 0  Allergies   Allergen Reactions    Morphine      Reaction is reddened skin and patient \"blacks out\"    Oxycodone Anxiety    Tomato Anaphylaxis    Alprazolam      Reaction is hallucinations  hallucinations    Hydrocodone-Acetaminophen      GI upset    Influenza Vaccines      ? Neuro issue and neurologist said not to get again    Oseltamivir     Oxycontin [Oxycodone Hcl]      Reaction is reddened skin and patient \"blacks out\"    Tamiflu [Oseltamivir Phosphate]      Reaction is hallucinations    Adhesive Tape Rash     Reaction is sloughing of skin  Skin comes off    Oseltamivir Phosphate Nausea And Vomiting       Past Medical History:   Diagnosis Date    Blood in urine     history of    Constipation     Endometriosis     Migraines     Raynaud's disease      Past Surgical History:   Procedure Laterality Date    ANKLE SURGERY      APPENDECTOMY      CHOLECYSTECTOMY      CYSTOSCOPY      HERNIA REPAIR      LAPAROSCOPY      multiple for endometriosis    TONSILLECTOMY       Family History   Problem Relation Age of Onset    Heart Disease Mother     Thyroid Disease Mother     Other Mother         kidney problems    Heart Disease Father     Allergy (Severe) Father         seasonal    Other Maternal Grandmother         brain tumor    Other Maternal Aunt         aneurysm    Cancer Paternal Cousin         ovarian      Social History     Tobacco Use    Smoking status: Never Smoker    Smokeless tobacco: Never Used   Substance Use Topics    Alcohol use: No     Frequency: Never    Drug use: No      Social History     Social History Narrative    PMH:    Medical Problems:    Endometriosis, Raynauds, Migraine    Surgical Hx:    Appendectomy, Removal of Gallbladder    Laparoscopy - Endometriosis    Hernia Repair - Hiatal    Lt ankle -- several surgeries, cartilage removal    Reviewed, no changes. FH:    Father:    Seasonal Allergies, Heart Disease. Mother:    Heart Disease. paternal cousin ovarian cancer in 42's;    maternal aunt berry aneurysm    maternal grandmother brain tumor Diagnosis Orders   1. Varicose veins of both lower extremities with inflammation  GRADIENT COMPRESSION STOCKING, FULL-LENGTH 30-40 MM HG, EACH    US DUP LOWER EXTREMITY RIGHT PAYTON   2. Pain in right leg  CBC Auto Differential    Uric Acid    US DUP LOWER EXTREMITY RIGHT PAYTON    XR KNEE RIGHT (MIN 4 VIEWS)   3. Prepatellar bursitis of right knee  CBC Auto Differential    Uric Acid    US DUP LOWER EXTREMITY RIGHT PAYTON    predniSONE (DELTASONE) 10 MG tablet   4. Allergic rhinitis due to other allergic trigger, unspecified seasonality         Allergic rhinitis  May try nasal saline, nasal steroid daily, Allegra or equivalent daily as needed.  Counseled on singular    Pain in right leg  Counseled extensively. Differential reviewed, including serious etiologies. Suspicious more of a prepatellar bursitis. Symptoms seem to have resolved on cephalexin. Check CBC uric acid. She is on baby aspirin for a week. Will check an ultrasound to ensure no thrombophlebitis or DVT. She still in some discomfort. Work excuse provided for today. Prednisone with precautions not to take with NSAIDs. Please see below. Prepatellar bursitis of right knee  Counseled extensively. Differential reviewed, including serious etiologies. See above. Check x-ray. Check CBC uric acid. Prednisone taper precautions not to take with NSAIDs. Varicose veins of both lower extremities with inflammation  Counseled extensively. Differential reviewed, including serious etiologies. Check ultrasound. Counseled on various aspirin therapies, anticoagulation, counseled on leg elevation low-salt. Declines referral.  Recommended compression stockings thigh-high since the varicose veins affecting her entire leg and she would like a prescription. No flowsheet data found. Plan as above. Counseled extensively and differential diagnoses relevant to above were reviewed, including serious etiologies.    Side effects and interactions of medications were reviewed. Follow-up next week sooner as needed          As long as symptoms steadily improve/resolve, and medical conditions follow the expected course, FU as below, sooner PRN. Return in about 1 week (around 9/7/2020), or if symptoms worsen or fail to improve. Signs and symptoms to watch for discussed, serious signs and symptoms reviewed. ER if any. Darrell Dukes MD    Patients are advised to check with insurance company to ensure coverage and to fully understand benefits and cost prior to any testing. This note was created with the assistance of voice recognition software. Document was reviewed however may contain grammatical errors.

## 2020-08-31 NOTE — TELEPHONE ENCOUNTER
The pt is calling to get the results of the XR KNEE RIGHT and US DUP LOWER EXTREMITY she had done today

## 2020-08-31 NOTE — ASSESSMENT & PLAN NOTE
Counseled extensively. Differential reviewed, including serious etiologies. Suspicious more of a prepatellar bursitis. Symptoms seem to have resolved on cephalexin. Check CBC uric acid. She is on baby aspirin for a week. Will check an ultrasound to ensure no thrombophlebitis or DVT. She still in some discomfort. Work excuse provided for today. Prednisone with precautions not to take with NSAIDs. Please see below.

## 2020-09-01 ENCOUNTER — HOSPITAL ENCOUNTER (OUTPATIENT)
Age: 50
Discharge: HOME | End: 2020-09-03
Payer: COMMERCIAL

## 2020-09-01 ENCOUNTER — TELEPHONE (OUTPATIENT)
Dept: VASCULAR SURGERY | Age: 50
End: 2020-09-01

## 2020-09-01 LAB
BASOPHILS ABSOLUTE: 0.05 E9/L (ref 0–0.2)
BASOPHILS RELATIVE PERCENT: 0.8 % (ref 0–2)
EOSINOPHILS ABSOLUTE: 0.22 E9/L (ref 0.05–0.5)
EOSINOPHILS RELATIVE PERCENT: 3.6 % (ref 0–6)
HCT VFR BLD CALC: 45.4 % (ref 34–48)
HEMOGLOBIN: 14.4 G/DL (ref 11.5–15.5)
IMMATURE GRANULOCYTES #: 0.01 E9/L
IMMATURE GRANULOCYTES %: 0.2 % (ref 0–5)
LYMPHOCYTES ABSOLUTE: 2.15 E9/L (ref 1.5–4)
LYMPHOCYTES RELATIVE PERCENT: 34.8 % (ref 20–42)
MCH RBC QN AUTO: 28.6 PG (ref 26–35)
MCHC RBC AUTO-ENTMCNC: 31.7 % (ref 32–34.5)
MCV RBC AUTO: 90.1 FL (ref 80–99.9)
MONOCYTES ABSOLUTE: 0.38 E9/L (ref 0.1–0.95)
MONOCYTES RELATIVE PERCENT: 6.2 % (ref 2–12)
NEUTROPHILS ABSOLUTE: 3.36 E9/L (ref 1.8–7.3)
NEUTROPHILS RELATIVE PERCENT: 54.4 % (ref 43–80)
PDW BLD-RTO: 13.3 FL (ref 11.5–15)
PLATELET # BLD: 216 E9/L (ref 130–450)
PMV BLD AUTO: 10.4 FL (ref 7–12)
RBC # BLD: 5.04 E12/L (ref 3.5–5.5)
WBC # BLD: 6.2 E9/L (ref 4.5–11.5)

## 2020-09-01 PROCEDURE — 36415 COLL VENOUS BLD VENIPUNCTURE: CPT

## 2020-09-01 PROCEDURE — 85025 COMPLETE CBC W/AUTO DIFF WBC: CPT

## 2020-09-01 PROCEDURE — 84550 ASSAY OF BLOOD/URIC ACID: CPT

## 2020-09-02 LAB — URIC ACID, SERUM: 4.6 MG/DL (ref 2.4–5.7)

## 2020-09-08 ENCOUNTER — OFFICE VISIT (OUTPATIENT)
Dept: PRIMARY CARE CLINIC | Age: 50
End: 2020-09-08
Payer: COMMERCIAL

## 2020-09-08 VITALS
HEIGHT: 65 IN | DIASTOLIC BLOOD PRESSURE: 80 MMHG | TEMPERATURE: 97.5 F | RESPIRATION RATE: 16 BRPM | SYSTOLIC BLOOD PRESSURE: 122 MMHG | OXYGEN SATURATION: 98 % | HEART RATE: 66 BPM | BODY MASS INDEX: 30.16 KG/M2 | WEIGHT: 181 LBS

## 2020-09-08 PROBLEM — I80.9 SUPERFICIAL PHLEBITIS: Status: ACTIVE | Noted: 2020-09-08

## 2020-09-08 PROBLEM — D48.5 NEOPLASM OF UNCERTAIN BEHAVIOR OF SKIN: Status: ACTIVE | Noted: 2020-09-08

## 2020-09-08 PROBLEM — Z12.39 BREAST CANCER SCREENING: Status: ACTIVE | Noted: 2020-09-08

## 2020-09-08 PROCEDURE — 99214 OFFICE O/P EST MOD 30 MIN: CPT | Performed by: FAMILY MEDICINE

## 2020-09-08 RX ORDER — ASPIRIN 81 MG/1
81 TABLET ORAL DAILY
COMMUNITY
End: 2022-02-01 | Stop reason: ALTCHOICE

## 2020-09-08 NOTE — PROGRESS NOTES
20  Sanchohemravinder Decent : 1970 Sex: female  Age: 47 y.o. Chief Complaint   Patient presents with    Other     superficial blood clot, no changes       HPI  HPI:        Patient presents today for follow-up. Has been using her compression stockings although not today. Still some discomfort in the vein although improving. Says that she is on her feet a long period time it becomes more inflamed. Counseled on this. I did refer her to vascular to be safe, has appointment . Will see if we can move that up if she is concerned. She is taking her baby aspirin daily. Finished prednisone.   CBC/uric acid grossly negative    She also has a skin lesion on her left leg that she states is been there forever but it continues to concern me as I noticed at last visit as well,Also has a cyst under her left eyelid she would like checked      Most Recent Labs  CBC  Lab Results   Component Value Date    WBC 6.2 2020    WBC 5.7 2020    WBC 4.7 2020    RBC 5.04 2020    RBC 4.67 2020    RBC 5.36 2020    HGB 14.4 2020    HGB 13.6 2020    HGB 15.5 2020    HCT 45.4 2020    HCT 41.3 2020    HCT 48.2 2020    MCV 90.1 2020    MCV 88.4 2020    MCV 89.9 2020     2020     2020     2020      CMP  Lab Results   Component Value Date     2020     2020     2019    K 3.4 2020    K 3.5 2020    K 3.9 2019     2020     2020     2019    CO2 27 2020    CO2 25 2020    CO2 25 2019    ANIONGAP 11 2020    ANIONGAP 10 2020    ANIONGAP 15 2019    GLUCOSE 90 2020    GLUCOSE 96 2020    GLUCOSE 90 2019    BUN 7 2020    BUN 13 2020    BUN 16 2019    CREATININE 0.7 2020    CREATININE 0.7 2020    CREATININE 0.6 2019    LABGLOM >60 2020 02/19/2020    BILIRUBINUR Negative 02/13/2020    BILIRUBINUR neg 12/28/2019    BILIRUBINUR Negative 08/19/2019    BILIRUBINUR neg 07/19/2019    KETUA Negative 02/19/2020    KETUA 15mg 02/19/2020    KETUA Negative 02/13/2020    KETUA neg 12/28/2019    KETUA Negative 08/19/2019    SPECGRAV <=1.005 02/19/2020    SPECGRAV 1.030 02/19/2020    SPECGRAV >=1.030 02/13/2020    SPECGRAV 1.020 12/28/2019    SPECGRAV <=1.005 08/19/2019    BLOODU TRACE-INTACT 02/19/2020    BLOODU moderate 02/19/2020    BLOODU LARGE 02/13/2020    BLOODU moderate 12/28/2019    BLOODU TRACE-INTACT 08/19/2019    PHUR 6.5 02/19/2020    PHUR 5.5 02/19/2020    PHUR 5.5 02/13/2020    PHUR 6.0 12/28/2019    PHUR 6.0 08/19/2019    PROTEINU Negative 02/19/2020    PROTEINU neg 02/19/2020    PROTEINU Negative 02/13/2020    PROTEINU neg 12/28/2019    PROTEINU Negative 08/19/2019    UROBILINOGEN 0.2 02/19/2020    UROBILINOGEN 0.2 02/13/2020    UROBILINOGEN 0.2 08/19/2019    NITRU Negative 02/19/2020    NITRU Negative 02/13/2020    NITRU Negative 08/19/2019    LEUKOCYTESUR Negative 02/19/2020    LEUKOCYTESUR small 02/19/2020    LEUKOCYTESUR Negative 02/13/2020    LEUKOCYTESUR neg 12/28/2019    LEUKOCYTESUR Negative 08/19/2019     Urine Micro/Albumin Ratio  No results found for: MALBCR    Review of Systems  ROS:  Const: Denies chills, fever, malaise and sweats. Eyes: Denies discharge, pain, redness and visual disturbance. ENMT: Denies earaches, other ear symptoms. Denies nasal or sinus symptoms other than stated  above. Denies mouth and tongue lesions and sore throat. CV: Denies chest discomfort, pain; diaphoresis, dizziness, edema, lightheadedness, orthopnea,  palpitations, syncope and near syncopal episode or any exertional symptoms  Resp: Denies cough, hemoptysis, pleuritic pain, SOB, sputum production and wheezing. GI: Denies abdominal pain, change in bowel habits, hematochezia, melena, nausea and vomiting.   : Denies urinary symptoms including dysuria , urgency, frequency or hematuria. Musculo: As above   skin: Denies bruising and rash. As above  Neuro: Denies headache, numbness, stiff neck, tingling and focal weakness slurred speech or facial  droop  Hema/Lymph: Denies bleeding/bruising tendency and enlarged lymph nodes        Current Outpatient Medications:     aspirin 81 MG EC tablet, Take 81 mg by mouth daily, Disp: , Rfl:   Allergies   Allergen Reactions    Morphine      Reaction is reddened skin and patient \"blacks out\"    Oxycodone Anxiety    Tomato Anaphylaxis    Alprazolam      Reaction is hallucinations  hallucinations    Hydrocodone-Acetaminophen      GI upset    Influenza Vaccines      ?  Neuro issue and neurologist said not to get again    Oseltamivir     Oxycontin [Oxycodone Hcl]      Reaction is reddened skin and patient \"blacks out\"    Tamiflu [Oseltamivir Phosphate]      Reaction is hallucinations    Adhesive Tape Rash     Reaction is sloughing of skin  Skin comes off    Oseltamivir Phosphate Nausea And Vomiting       Past Medical History:   Diagnosis Date    Blood in urine     history of    Constipation     Endometriosis     Migraines     Raynaud's disease      Past Surgical History:   Procedure Laterality Date    ANKLE SURGERY      APPENDECTOMY      CHOLECYSTECTOMY      CYSTOSCOPY      HERNIA REPAIR      LAPAROSCOPY      multiple for endometriosis    TONSILLECTOMY       Family History   Problem Relation Age of Onset    Heart Disease Mother     Thyroid Disease Mother     Other Mother         kidney problems    Heart Disease Father     Allergy (Severe) Father         seasonal    Other Maternal Grandmother         brain tumor    Other Maternal Aunt         aneurysm    Cancer Paternal Cousin         ovarian      Social History     Tobacco Use    Smoking status: Never Smoker    Smokeless tobacco: Never Used   Substance Use Topics    Alcohol use: No     Frequency: Never    Drug use: No      Social History Social History Narrative    PMH:    Medical Problems:    Endometriosis, Raynauds, Migraine    Surgical Hx:    Appendectomy, Removal of Gallbladder    Laparoscopy - Endometriosis    Hernia Repair - Hiatal    Lt ankle -- several surgeries, cartilage removal    Reviewed, no changes. FH:    Father:    Seasonal Allergies, Heart Disease. Mother:    Heart Disease. paternal cousin ovarian cancer in 42's;    maternal aunt berry aneurysm    maternal grandmother brain tumor    Reviewed, no changes. SH:    Marital: Single. Work Status: Full-Time Employment - - [employer]. Personal Habits: Cigarette Use: Nonsmoker. Alcohol: Never used alcohol. Vitals:    09/08/20 1035   BP: 122/80   Pulse: 66   Resp: 16   Temp: 97.5 °F (36.4 °C)   SpO2: 98%   Weight: 181 lb (82.1 kg)   Height: 5' 5\" (1.651 m)      Wt Readings from Last 3 Encounters:   09/08/20 181 lb (82.1 kg)   08/31/20 179 lb (81.2 kg)   08/29/20 178 lb (80.7 kg)        Physical Exam  Exam:  Const: Appears comfortable. No signs of acute distress present. Head/Face: Atraumatic on inspection. Eyes: EOMI in both eyes. No discharge from the eyes. PERRL. Sclerae clear. ENMT: Auditory canals normal. Tympanic membranes: intact and translucent. External nose WNL. Nasal mucosa is clear. Oropharynx: No erythema or exudate. Posterior pharynx is normal.  Neck: Supple. Palpation reveals no adenopathy. No masses appreciated. No JVD. Carotids: no  bruits. Resp: Respirations are unlabored. Clear to auscultation. No rales, rhonchi or wheezes appreciated  over the lungs bilaterally. CV: Rate is regular. Rhythm is regular. No gallop or rubs. No heart murmur appreciated. Extremities: No clubbing, cyanosis, or edema. No calf inflammation or tenderness. Abdomen: Bowel sounds are normoactive. Abdomen is soft, nontender, and nondistended. No  abdominal masses. No palpable hepatosplenomegaly. Lymph: No palpable or visible regional lymphadenopathy.   Musculoskeletal: She does have chronic varicosities I do not appreciate active inflammation, cord, swelling calf inflammation or tenderness. Tenderness anterior knee significantly improved. Skin: Dry and warm with no rash. She does have a pearly cyst left lower eyelid and a dark irregular nevus left distal thigh  . Neuro: Alert and oriented. Affect: appropriate. Upper Extremities: 5/5 bilaterally. Lower Extremities:  5/5 bilaterally. Sensation intact to light touch. Reflexes: DTR's are symmetric and 2+ bilaterally. .  Cranial Nerves: Cranial nerves grossly intact. Ultrasound of the leg showed superficial thrombophlebitis of the varicose vein adjacent to the patella and site of palpable abnormality    Assessment and Plan:   Diagnosis Orders   1. Superficial phlebitis     2. Varicose veins of both lower extremities with inflammation     3. Breast cancer screening  [facility] DIGITAL SCREEN W OR WO CAD BILATERAL   4. Neoplasm of uncertain behavior of skin  Amb External Referral To Dermatology       Neoplasm of uncertain behavior of skin  Counseled extensively. Differential reviewed, including serious etiologies. Refer to dermatology    Superficial phlebitis  Counseled extensively. Differential reviewed, including serious etiologies. Try to move vascular appointment up. Counseled on baby aspirin. Counseled other treatment options depending on size and location. Counseled on leg elevation, compression stockings compliance, low-salt diet. Counseled on risk benefits of NSAIDs    Varicose veins of both lower extremities with inflammation  Counseled. Refer to vascular. Breast cancer screening  Counseled. Check mammogram.         No flowsheet data found. Plan as above. Counseled extensively and differential diagnoses relevant to above were reviewed, including serious etiologies. Side effects and interactions of medications were reviewed. Counseled extensively. Plan as above.   Otherwise going to get blood work as previously ordered and keep her October 13 appointment with me sooner as needed. She does work 10-hour shifts and she states with minimal break. Restrictions were reviewed, my advice given. She states this will not help her job security her finances and understands the precautions. As long as symptoms steadily improve/resolve, and medical conditions follow the expected course, FU as below, sooner PRN. No follow-ups on file. Signs and symptoms to watch for discussed, serious signs and symptoms reviewed. ER if any. Melanie Antunez MD    Patients are advised to check with insurance company to ensure coverage and to fully understand benefits and cost prior to any testing. This note was created with the assistance of voice recognition software. Document was reviewed however may contain grammatical errors.

## 2020-09-08 NOTE — ASSESSMENT & PLAN NOTE
Counseled extensively. Differential reviewed, including serious etiologies. Try to move vascular appointment up. Counseled on baby aspirin. Counseled other treatment options depending on size and location. Counseled on leg elevation, compression stockings compliance, low-salt diet.   Counseled on risk benefits of NSAIDs

## 2020-09-25 ENCOUNTER — TELEPHONE (OUTPATIENT)
Dept: ADMINISTRATIVE | Age: 50
End: 2020-09-25

## 2020-09-25 NOTE — TELEPHONE ENCOUNTER
Codi called from [employer], pt is requesting services through this company, she will need orders and an Rx from Dr Madelin Barron. Pt is requesting an Rx for Compression stockings, Rx to be faxed to [phone], or [phone].

## 2020-09-30 ENCOUNTER — OFFICE VISIT (OUTPATIENT)
Dept: VASCULAR SURGERY | Age: 50
End: 2020-09-30
Payer: COMMERCIAL

## 2020-09-30 ENCOUNTER — OFFICE VISIT (OUTPATIENT)
Dept: FAMILY MEDICINE CLINIC | Age: 50
End: 2020-09-30
Payer: COMMERCIAL

## 2020-09-30 VITALS — BODY MASS INDEX: 29.66 KG/M2 | RESPIRATION RATE: 16 BRPM | WEIGHT: 178 LBS | HEIGHT: 65 IN

## 2020-09-30 VITALS
BODY MASS INDEX: 30.82 KG/M2 | RESPIRATION RATE: 18 BRPM | HEIGHT: 65 IN | DIASTOLIC BLOOD PRESSURE: 72 MMHG | TEMPERATURE: 97.4 F | WEIGHT: 185 LBS | OXYGEN SATURATION: 97 % | HEART RATE: 65 BPM | SYSTOLIC BLOOD PRESSURE: 130 MMHG

## 2020-09-30 PROBLEM — I83.811 VARICOSE VEINS OF LEG WITH PAIN, RIGHT: Status: ACTIVE | Noted: 2020-09-30

## 2020-09-30 PROBLEM — I80.01 SUPERFICIAL PHLEBITIS OF LEG, RIGHT: Status: ACTIVE | Noted: 2020-09-30

## 2020-09-30 PROCEDURE — 96372 THER/PROPH/DIAG INJ SC/IM: CPT | Performed by: PHYSICIAN ASSISTANT

## 2020-09-30 PROCEDURE — 99214 OFFICE O/P EST MOD 30 MIN: CPT | Performed by: PHYSICIAN ASSISTANT

## 2020-09-30 PROCEDURE — 99204 OFFICE O/P NEW MOD 45 MIN: CPT | Performed by: SURGERY

## 2020-09-30 RX ORDER — PREDNISONE 20 MG/1
TABLET ORAL
Qty: 18 TABLET | Refills: 0 | Status: SHIPPED
Start: 2020-09-30 | End: 2020-10-13

## 2020-09-30 RX ORDER — METHYLPREDNISOLONE ACETATE 80 MG/ML
60 INJECTION, SUSPENSION INTRA-ARTICULAR; INTRALESIONAL; INTRAMUSCULAR; SOFT TISSUE ONCE
Status: COMPLETED | OUTPATIENT
Start: 2020-09-30 | End: 2020-09-30

## 2020-09-30 RX ADMIN — METHYLPREDNISOLONE ACETATE 60 MG: 80 INJECTION, SUSPENSION INTRA-ARTICULAR; INTRALESIONAL; INTRAMUSCULAR; SOFT TISSUE at 16:25

## 2020-09-30 NOTE — PROGRESS NOTES
20  James Escobar : 1970 Sex: female  Age 47 y.o. Subjective:  Chief Complaint   Patient presents with    Rash     rash on hand and ankle          HPI:   James Escobar , 47 y.o. female presents to express care for evaluation of rash. The patient believes that she has poison sumac. The patient states that she gets this multiple times a year. This is the second time this year that she has had it. The patient states that is mostly to the hands and to the ankle area. The patient does not have any fever, chills, nausea, vomiting. No lip or tongue swelling. The patient is not having any shortness of breath. The patient states that typically she is required to get steroids for this. ROS:   Unless otherwise stated in this report the patient's positive and negative responses for review of systems for constitutional, eyes, ENT, cardiovascular, respiratory, gastrointestinal, neurological, , musculoskeletal, and integument systems and related systems to the presenting problem are either stated in the history of present illness or were not pertinent or were negative for the symptoms and/or complaints related to the presenting medical problem. Positives and pertinent negatives as per HPI. All others reviewed and are negative.       PMH:     Past Medical History:   Diagnosis Date    Blood in urine     history of    Constipation     Endometriosis     Migraines     Raynaud's disease     Superficial phlebitis of leg, right 2020    Varicose veins of leg with pain, right 2020       Past Surgical History:   Procedure Laterality Date    ANKLE SURGERY      APPENDECTOMY      CHOLECYSTECTOMY      CYSTOSCOPY      HERNIA REPAIR      LAPAROSCOPY      multiple for endometriosis    TONSILLECTOMY         Family History   Problem Relation Age of Onset    Heart Disease Mother     Thyroid Disease Mother     Other Mother         kidney problems    Heart Disease Father     Allergy (Severe) Father         seasonal    Other Maternal Grandmother         brain tumor    Other Maternal Aunt         aneurysm    Cancer Paternal Cousin         ovarian        Medications:     Current Outpatient Medications:     Elastic Bandages & Supports (JOBST KNEE HIGH COMPRESSION SM) MISC, Knee high with 20- 30 mmhg of compression, Disp: 1 each, Rfl: 10    predniSONE (DELTASONE) 20 MG tablet, 3 tablets once daily for 3 days, 2 tablets once daily for 3 days, one tablet once daily for 3 days, Disp: 18 tablet, Rfl: 0    aspirin 81 MG EC tablet, Take 81 mg by mouth daily, Disp: , Rfl:     Allergies: Allergies   Allergen Reactions    Morphine      Reaction is reddened skin and patient \"blacks out\"    Oxycodone Anxiety    Tomato Anaphylaxis    Alprazolam      Reaction is hallucinations  hallucinations    Hydrocodone-Acetaminophen      GI upset    Influenza Vaccines      ? Neuro issue and neurologist said not to get again    Oseltamivir     Oxycontin [Oxycodone Hcl]      Reaction is reddened skin and patient \"blacks out\"    Tamiflu [Oseltamivir Phosphate]      Reaction is hallucinations    Adhesive Tape Rash     Reaction is sloughing of skin  Skin comes off    Oseltamivir Phosphate Nausea And Vomiting       Social History:     Social History     Tobacco Use    Smoking status: Never Smoker    Smokeless tobacco: Never Used   Substance Use Topics    Alcohol use: No     Frequency: Never    Drug use: No       Patient lives at home. Physical Exam:     Vitals:    09/30/20 1617   BP: 130/72   Pulse: 65   Resp: 18   Temp: 97.4 °F (36.3 °C)   SpO2: 97%   Weight: 185 lb (83.9 kg)   Height: 5' 5\" (1.651 m)       Exam:  Physical Exam  Nurses note and vital signs reviewed and patient is not hypoxic. General: The patient appears well and in no apparent distress. Patient is resting comfortably on cart. Skin: Warm, dry, no pallor noted. Macular papular rash noted to the hands and to the ankle.   There is no

## 2020-10-08 PROBLEM — Z12.39 BREAST CANCER SCREENING: Status: RESOLVED | Noted: 2020-09-08 | Resolved: 2020-10-08

## 2020-10-13 ENCOUNTER — OFFICE VISIT (OUTPATIENT)
Dept: PRIMARY CARE CLINIC | Age: 50
End: 2020-10-13
Payer: COMMERCIAL

## 2020-10-13 ENCOUNTER — HOSPITAL ENCOUNTER (OUTPATIENT)
Age: 50
Discharge: HOME | End: 2020-10-15
Payer: COMMERCIAL

## 2020-10-13 VITALS
SYSTOLIC BLOOD PRESSURE: 122 MMHG | HEIGHT: 65 IN | WEIGHT: 182 LBS | OXYGEN SATURATION: 98 % | RESPIRATION RATE: 16 BRPM | BODY MASS INDEX: 30.32 KG/M2 | HEART RATE: 68 BPM | TEMPERATURE: 97.7 F | DIASTOLIC BLOOD PRESSURE: 78 MMHG

## 2020-10-13 LAB — PROLACTIN: 8.9 NG/ML

## 2020-10-13 PROCEDURE — 36415 COLL VENOUS BLD VENIPUNCTURE: CPT

## 2020-10-13 PROCEDURE — 84146 ASSAY OF PROLACTIN: CPT

## 2020-10-13 PROCEDURE — 99396 PREV VISIT EST AGE 40-64: CPT | Performed by: FAMILY MEDICINE

## 2020-10-13 NOTE — PROGRESS NOTES
8/15/19  Duglas Felder : 1970 Sex: female  Age: 45 y.o. Chief Complaint   Patient presents with    Annual Exam     still having issues with blood clot in leg. states is not taking a blood thinner. works 12 hour shifts on feet. states swells after working       HPI:    Patient presents today for follow-up and for physical.  She did see Dr. Óscar Amezcua. He recommended 6 weeks baby aspirin and she is going to follow-up with him this week. Although she is feeling better the vein swells becomes tender when on her feet for period time. Work restrictions reviewed. Better when she is sitting. Saw dermatologist had wart frozen and will follow-up, also given MetroGel for rosacea but did not fill it yet    Did not get blood work yet or mammogram yet        Again, reviewed MRI 3/16 again. Summary was     Again I recommended she consider seeing at least an endocrinologist and at least getting repeat imaging but declines now. Again declines. Denies symptoms. Prolactin  nL  17 , - as well, repeat prolactin.       Most Recent Labs  CBC  Lab Results   Component Value Date    WBC 6.2 2020    WBC 5.7 2020    WBC 4.7 2020    RBC 5.04 2020    RBC 4.67 2020    RBC 5.36 2020    HGB 14.4 2020    HGB 13.6 2020    HGB 15.5 2020    HCT 45.4 2020    HCT 41.3 2020    HCT 48.2 2020    MCV 90.1 2020    MCV 88.4 2020    MCV 89.9 2020     2020     2020     2020      CMP  Lab Results   Component Value Date     2020     2020     2019    K 3.4 2020    K 3.5 2020    K 3.9 2019     2020     2020     2019    CO2 27 2020    CO2 25 2020    CO2 25 2019    ANIONGAP 11 2020    ANIONGAP 10 2020    ANIONGAP 15 2019    GLUCOSE 90 2020    GLUCOSE 96 2020    GLUCOSE 90 08/19/2019    BUN 7 02/19/2020    BUN 13 02/13/2020    BUN 16 08/19/2019    CREATININE 0.7 02/19/2020    CREATININE 0.7 02/13/2020    CREATININE 0.6 08/19/2019    LABGLOM >60 02/19/2020    LABGLOM >60 02/13/2020    LABGLOM >60 08/19/2019    GFRAA >60 02/19/2020    GFRAA >60 02/13/2020    GFRAA >60 08/19/2019    CALCIUM 9.0 02/19/2020    CALCIUM 9.5 02/13/2020    CALCIUM 9.3 08/19/2019    PROT 6.7 02/19/2020    PROT 7.1 02/13/2020    PROT 6.9 08/19/2019    LABALBU 4.5 02/19/2020    LABALBU 4.8 02/13/2020    LABALBU 4.3 08/19/2019    BILITOT 0.5 02/19/2020    BILITOT 1.2 02/13/2020    BILITOT 0.8 08/19/2019    ALKPHOS 90 02/19/2020    ALKPHOS 111 02/13/2020    ALKPHOS 84 08/19/2019     02/19/2020    AST 20 02/13/2020    AST 15 08/19/2019    ALT 75 02/19/2020    ALT 16 02/13/2020    ALT 11 08/19/2019     A1C  No results found for: LABA1C  TSH  Lab Results   Component Value Date    TSH 1.600 08/19/2019    TSH 1.160 02/24/2015     FREET4  No results found for: O7COATO  LIPID  Lab Results   Component Value Date    CHOL 176 08/19/2019    CHOL 132 02/24/2015    HDL 77 08/19/2019    HDL 70 02/24/2015    LDLCALC 85 08/19/2019    LDLCALC 50 02/24/2015    TRIG 71 08/19/2019    TRIG 61 02/24/2015     VITAMIN D  No results found for: VITD25  MAGNESIUM  No results found for: MG   PHOS  No results found for: PHOS   JAZMYNE   Lab Results   Component Value Date    JAZMYNE NEGATIVE 08/19/2019     RHEUMATOID FACTOR  Lab Results   Component Value Date    RF 10 08/19/2019     PSA  No results found for: PSA   HEPATITIS C  No results found for: HCVABI  HIV  No results found for: GVK1JXV, HIV1QT  UA  Lab Results   Component Value Date    COLORU Yellow 02/19/2020    COLORU yellow 02/19/2020    COLORU Yellow 02/13/2020    COLORU yellow 12/28/2019    COLORU Yellow 08/19/2019    CLARITYU Clear 02/19/2020    CLARITYU clean 02/19/2020    CLARITYU Clear 02/13/2020    CLARITYU clear 12/28/2019    CLARITYU Clear 08/19/2019    GLUCOSEU Negative 02/19/2020    GLUCOSEU neg 02/19/2020    GLUCOSEU Negative 02/13/2020    GLUCOSEU neg 12/28/2019    GLUCOSEU Negative 08/19/2019    BILIRUBINUR Negative 02/19/2020    BILIRUBINUR neg 02/19/2020    BILIRUBINUR Negative 02/13/2020    BILIRUBINUR neg 12/28/2019    BILIRUBINUR Negative 08/19/2019    BILIRUBINUR neg 07/19/2019    KETUA Negative 02/19/2020    KETUA 15mg 02/19/2020    KETUA Negative 02/13/2020    KETUA neg 12/28/2019    KETUA Negative 08/19/2019    SPECGRAV <=1.005 02/19/2020    SPECGRAV 1.030 02/19/2020    SPECGRAV >=1.030 02/13/2020    SPECGRAV 1.020 12/28/2019    SPECGRAV <=1.005 08/19/2019    BLOODU TRACE-INTACT 02/19/2020    BLOODU moderate 02/19/2020    BLOODU LARGE 02/13/2020    BLOODU moderate 12/28/2019    BLOODU TRACE-INTACT 08/19/2019    PHUR 6.5 02/19/2020    PHUR 5.5 02/19/2020    PHUR 5.5 02/13/2020    PHUR 6.0 12/28/2019    PHUR 6.0 08/19/2019    PROTEINU Negative 02/19/2020    PROTEINU neg 02/19/2020    PROTEINU Negative 02/13/2020    PROTEINU neg 12/28/2019    PROTEINU Negative 08/19/2019    UROBILINOGEN 0.2 02/19/2020    UROBILINOGEN 0.2 02/13/2020    UROBILINOGEN 0.2 08/19/2019    NITRU Negative 02/19/2020    NITRU Negative 02/13/2020    NITRU Negative 08/19/2019    LEUKOCYTESUR Negative 02/19/2020    LEUKOCYTESUR small 02/19/2020    LEUKOCYTESUR Negative 02/13/2020    LEUKOCYTESUR neg 12/28/2019    LEUKOCYTESUR Negative 08/19/2019     Urine Micro/Albumin Ratio  No results found for: [facility]      Health Maintenance: Well balanced/proper diet reviewed. Counseled on MVI, fiber, b-complex,  calcium and fish oils (including pros/cons of OTC vs Rx). Exercise recommendations reviewed. Reviewed recommendations regarding Td (Tdap) (11/15), pneumovax/prevnar 13 - pt was interested but nervous about cost so declined, flu shot (Declines) , Hepatitis vaccines (HEP A declines)  and shingles vaccine, had chicken pox. . Importance of Regular Eye and Dental exams reviewed.   Risks/Benefits of ASA reviewed and discussed current recommendations. Caffeine risks/limits and  Sun protection reviewed. Notify if any new or changing moles/skin lesions etc. Dexa Scan  indications/ risk factors for osteoportic fractures and prevention reviewed. declines now. Colonoscopy recommendations reviewed. Pt denies change in bowel habbits or [address] of colon  polyps/CA. had 2017. nL, [place]. Planning 10yrs. Heme Cards declined    Declines EKG or other  cardiac testing including referral, Stress test, 2D  echo, etc. Reviewed indications for PFT's. Also counseled on indications for imaging. Pt declines  further imaging including Brain, carotid, chest, Abdominal, Aortic or otherwise. Counseled on instructions regarding, and importance of monthly self breast exams, yearly physician  exams (sooner if sx's). Indications for mammograms reviewed and importance. Never got done as  ordered. Importance of regular GYN exams reviewed and pt to follow here or with her gynecologist  as directed. Noncompliant. Agrees to mammo order. Behind on GYN, referred in past, should see         ROS:  Const: Denies chills, fever, malaise and sweats. Eyes: Denies discharge, pain, redness and visual disturbance. ENMT: Denies earaches, other ear symptoms. Denies nasal or sinus symptoms other than stated  above. Denies mouth and tongue lesions and sore throat. CV: Denies chest discomfort, pain; diaphoresis, dizziness, edema, lightheadedness, orthopnea,  palpitations, syncope and near syncopal episode or any exertional symptoms  Resp: Denies cough, hemoptysis, pleuritic pain, SOB, sputum production and wheezing. GI: Denies abdominal pain, change in bowel habits, hematochezia, melena, nausea and vomiting. : Denies urinary symptoms including dysuria , urgency, frequency or hematuria. Musculo: Denies musculoskeletal symptoms, other than above. .  Skin: Denies bruising and rash.   Neuro: Denies headache, numbness, stiff neck, tingling and focal weakness Raynauds, Migraine    Surgical Hx:    Appendectomy, Removal of Gallbladder    Laparoscopy - Endometriosis    Hernia Repair - Hiatal    Lt ankle -- several surgeries, cartilage removal    Reviewed, no changes. FH:    Father:    Seasonal Allergies, Heart Disease. Mother:    Heart Disease. paternal cousin ovarian cancer in 42's;    maternal aunt berry aneurysm    maternal grandmother brain tumor    Reviewed, no changes. SH:    Marital: Single. Work Status: Full-Time Employment - - [employer]. Personal Habits: Cigarette Use: Nonsmoker. Alcohol: Never used alcohol. Vitals:    10/13/20 1309   BP: 122/78   Pulse: 68   Resp: 16   Temp: 97.7 °F (36.5 °C)   SpO2: 98%   Weight: 182 lb (82.6 kg)   Height: 5' 5\" (1.651 m)      Wt Readings from Last 3 Encounters:   10/13/20 182 lb (82.6 kg)   09/30/20 185 lb (83.9 kg)   09/30/20 178 lb (80.7 kg)        Physical Exam  Exam:  Const: Appears comfortable. No signs of acute distress present. Head/Face: Atraumatic on inspection. Eyes: EOMI in both eyes. No discharge from the eyes. PERRL. Sclerae clear. ENMT: Auditory canals normal. Tympanic membranes: intact and translucent. External nose WNL. Nasal mucosa is clear. Oropharynx: No erythema or exudate. Posterior pharynx is normal.  Neck: Supple. Palpation reveals no adenopathy. No masses appreciated. No JVD. Carotids: no  bruits. Resp: Respirations are unlabored. Clear to auscultation. No rales, rhonchi or wheezes appreciated  over the lungs bilaterally. CV: Rate is regular. Rhythm is regular. No gallop or rubs. No heart murmur appreciated. Extremities: No clubbing, cyanosis, or edema. No calf inflammation or tenderness. Abdomen: Bowel sounds are normoactive. Abdomen is soft, nontender, and nondistended. No  abdominal masses. No palpable hepatosplenomegaly. Lymph: No palpable or visible regional lymphadenopathy. Musculoskeletal: no acute joint inflammation. Knee exam overall unremarkable at this time. Skin: Dry and warm with no rash. Skin normal to inspection and palpation overall. Neuro: Alert and oriented. Affect: appropriate. Upper Extremities: 5/5 bilaterally. Lower Extremities:  5/5 bilaterally. Sensation intact to light touch. Reflexes: DTR's are symmetric and 2+ bilaterally. .  Cranial Nerves: Cranial nerves grossly intact. Assessment and Plan:   Diagnosis Orders   1. Health maintenance examination     2. Pituitary neoplasm  PROLACTIN   3. Superficial phlebitis     4. Varicose veins of both lower extremities with inflammation         No problem-specific Assessment & Plan notes found for this encounter. Superficial phlebitis  Counseled extensively. Differential reviewed, including serious etiologies. Continue per Dr. Alphonse Interiano. Currently on baby aspirin. Counseled other treatment options depending on size and location. Counseled on leg elevation, compression stockings compliance, low-salt diet. Counseled on risk benefits of NSAIDs. Restrictions reviewed. Varicose veins of both lower extremities with inflammation  Counseled. Continue per Dr. Junior Peres neoplasm  Counseled extensively. Differential reviewed, including serious etiologies. Blood  work negative in past.  Prolactin 9/19 NEG. repeat. Continues to refuse repeat imaging including MRI.  Refuses seeing endocrinology or neurosurgery.  No longer seeing neurology        Overweight (BMI 30)  Counseled. Risk reviewed. Declines formal intervention     Allergic rhinitis  May try nasal saline, nasal steroid daily, Allegra or equivalent daily as needed.  Counseled on singular     Health maintenance examination  Health maintenance issues discussed at length as above, 10/20. Encouraged yearly physicals.           Migraine  Counseled extensively. Differential reviewed, including serious etiologies.  SHe is to follow with neurology.  Used to take Topamax.  Didn't like SE's. Nose runs after migraine.  Discussed vasomotor

## 2020-10-22 ENCOUNTER — HOSPITAL ENCOUNTER (OUTPATIENT)
Dept: MAMMOGRAPHY | Age: 50
Discharge: HOME | End: 2020-10-24
Payer: COMMERCIAL

## 2020-10-22 PROCEDURE — 77067 SCR MAMMO BI INCL CAD: CPT

## 2020-10-27 ENCOUNTER — OFFICE VISIT (OUTPATIENT)
Dept: PRIMARY CARE CLINIC | Age: 50
End: 2020-10-27
Payer: COMMERCIAL

## 2020-10-27 VITALS
TEMPERATURE: 97.7 F | DIASTOLIC BLOOD PRESSURE: 70 MMHG | HEIGHT: 65 IN | RESPIRATION RATE: 16 BRPM | OXYGEN SATURATION: 98 % | SYSTOLIC BLOOD PRESSURE: 128 MMHG | HEART RATE: 81 BPM | BODY MASS INDEX: 30.82 KG/M2 | WEIGHT: 185 LBS

## 2020-10-27 PROBLEM — R68.89 HEAT INTOLERANCE: Status: ACTIVE | Noted: 2020-10-27

## 2020-10-27 PROBLEM — R74.8 ELEVATED LIVER ENZYMES: Status: ACTIVE | Noted: 2020-10-27

## 2020-10-27 PROCEDURE — 99213 OFFICE O/P EST LOW 20 MIN: CPT | Performed by: FAMILY MEDICINE

## 2020-10-27 NOTE — ASSESSMENT & PLAN NOTE
Counseled extensively. Differential reviewed. I noticed this from blood work drawn in the ER 2/20. Asymptomatic now. Repeat. Check hepatitis profile.

## 2020-10-27 NOTE — PROGRESS NOTES
8/15/19  Tiffany Nicholsoniam : 1970 Sex: female  Age: 45 y.o. Chief Complaint   Patient presents with    2 Week Follow-Up    Other     pt states always hot       HPI:      Patient presents today to review blood work. All labs were ordered on 10/9/2019 with an expected date of 10/8/2020-she went to the lab 10/13/2020. We had a urinalysis, CBC with differential, CMP, TSH, lipid panel and prolactin ordered, for some reason the lab angela the prolactin and canceled all the other labs. She overall feels well. She says she feels warmer than many do in cold environments but no actual flushing, no blood pressure issues, no diaphoresis, no bowel symptoms. Discussed rare conditions such as feel, carcinoid-defers evaluation now is no other symptoms. I would like to look at her TFT. She will be due for FIT cards after , these were ordered. Mammogram pending. Prolactin level is normal.      Again, reviewed MRI 3/16 again. Summary was     Again I recommended she consider seeing at least an endocrinologist and at least getting repeat imaging but declines now. Again declines. Denies symptoms.  Prolactin remains normal    Most Recent Labs  CBC  Lab Results   Component Value Date    WBC 6.2 2020    WBC 5.7 2020    WBC 4.7 2020    RBC 5.04 2020    RBC 4.67 2020    RBC 5.36 2020    HGB 14.4 2020    HGB 13.6 2020    HGB 15.5 2020    HCT 45.4 2020    HCT 41.3 2020    HCT 48.2 2020    MCV 90.1 2020    MCV 88.4 2020    MCV 89.9 2020     2020     2020     2020      CMP  Lab Results   Component Value Date     2020     2020     2019    K 3.4 2020    K 3.5 2020    K 3.9 2019     2020     2020     2019    CO2 27 2020    CO2 25 2020    CO2 25 2019    ANIONGAP 11 2020    ANIONGAP 10 02/13/2020    ANIONGAP 15 08/19/2019    GLUCOSE 90 02/19/2020    GLUCOSE 96 02/13/2020    GLUCOSE 90 08/19/2019    BUN 7 02/19/2020    BUN 13 02/13/2020    BUN 16 08/19/2019    CREATININE 0.7 02/19/2020    CREATININE 0.7 02/13/2020    CREATININE 0.6 08/19/2019    LABGLOM >60 02/19/2020    LABGLOM >60 02/13/2020    LABGLOM >60 08/19/2019    GFRAA >60 02/19/2020    GFRAA >60 02/13/2020    GFRAA >60 08/19/2019    CALCIUM 9.0 02/19/2020    CALCIUM 9.5 02/13/2020    CALCIUM 9.3 08/19/2019    PROT 6.7 02/19/2020    PROT 7.1 02/13/2020    PROT 6.9 08/19/2019    LABALBU 4.5 02/19/2020    LABALBU 4.8 02/13/2020    LABALBU 4.3 08/19/2019    BILITOT 0.5 02/19/2020    BILITOT 1.2 02/13/2020    BILITOT 0.8 08/19/2019    ALKPHOS 90 02/19/2020    ALKPHOS 111 02/13/2020    ALKPHOS 84 08/19/2019     02/19/2020    AST 20 02/13/2020    AST 15 08/19/2019    ALT 75 02/19/2020    ALT 16 02/13/2020    ALT 11 08/19/2019     A1C  No results found for: LABA1C  TSH  Lab Results   Component Value Date    TSH 1.600 08/19/2019    TSH 1.160 02/24/2015     FREET4  No results found for: C3IPPGR  LIPID  Lab Results   Component Value Date    CHOL 176 08/19/2019    CHOL 132 02/24/2015    HDL 77 08/19/2019    HDL 70 02/24/2015    LDLCALC 85 08/19/2019    LDLCALC 50 02/24/2015    TRIG 71 08/19/2019    TRIG 61 02/24/2015     VITAMIN D  No results found for: VITD25  MAGNESIUM  No results found for: MG   PHOS  No results found for: PHOS   JAZMYNE   Lab Results   Component Value Date    JAZMYNE NEGATIVE 08/19/2019     RHEUMATOID FACTOR  Lab Results   Component Value Date    RF 10 08/19/2019     PSA  No results found for: PSA   HEPATITIS C  No results found for: HCVABI  HIV  No results found for: HRG2CZT, HIV1QT  UA  Lab Results   Component Value Date    COLORU Yellow 02/19/2020    COLORU yellow 02/19/2020    COLORU Yellow 02/13/2020    COLORU yellow 12/28/2019    COLORU Yellow 08/19/2019    CLARITYU Clear 02/19/2020    CLARITYU clean 02/19/2020 CLARITYU Clear 02/13/2020    CLARITYU clear 12/28/2019    CLARITYU Clear 08/19/2019    GLUCOSEU Negative 02/19/2020    GLUCOSEU neg 02/19/2020    GLUCOSEU Negative 02/13/2020    GLUCOSEU neg 12/28/2019    GLUCOSEU Negative 08/19/2019    BILIRUBINUR Negative 02/19/2020    BILIRUBINUR neg 02/19/2020    BILIRUBINUR Negative 02/13/2020    BILIRUBINUR neg 12/28/2019    BILIRUBINUR Negative 08/19/2019    BILIRUBINUR neg 07/19/2019    KETUA Negative 02/19/2020    KETUA 15mg 02/19/2020    KETUA Negative 02/13/2020    KETUA neg 12/28/2019    KETUA Negative 08/19/2019    SPECGRAV <=1.005 02/19/2020    SPECGRAV 1.030 02/19/2020    SPECGRAV >=1.030 02/13/2020    SPECGRAV 1.020 12/28/2019    SPECGRAV <=1.005 08/19/2019    BLOODU TRACE-INTACT 02/19/2020    BLOODU moderate 02/19/2020    BLOODU LARGE 02/13/2020    BLOODU moderate 12/28/2019    BLOODU TRACE-INTACT 08/19/2019    PHUR 6.5 02/19/2020    PHUR 5.5 02/19/2020    PHUR 5.5 02/13/2020    PHUR 6.0 12/28/2019    PHUR 6.0 08/19/2019    PROTEINU Negative 02/19/2020    PROTEINU neg 02/19/2020    PROTEINU Negative 02/13/2020    PROTEINU neg 12/28/2019    PROTEINU Negative 08/19/2019    UROBILINOGEN 0.2 02/19/2020    UROBILINOGEN 0.2 02/13/2020    UROBILINOGEN 0.2 08/19/2019    NITRU Negative 02/19/2020    NITRU Negative 02/13/2020    NITRU Negative 08/19/2019    LEUKOCYTESUR Negative 02/19/2020    LEUKOCYTESUR small 02/19/2020    LEUKOCYTESUR Negative 02/13/2020    LEUKOCYTESUR neg 12/28/2019    LEUKOCYTESUR Negative 08/19/2019     Urine Micro/Albumin Ratio  No results found for: MALBCR            ROS:  Const: Denies chills, fever, malaise and sweats. Eyes: Denies discharge, pain, redness and visual disturbance. ENMT: Denies earaches, other ear symptoms. Denies nasal or sinus symptoms other than stated  above. Denies mouth and tongue lesions and sore throat.   CV: Denies chest discomfort, pain; diaphoresis, dizziness, edema, lightheadedness, orthopnea,  palpitations, syncope and near syncopal episode or any exertional symptoms  Resp: Denies cough, hemoptysis, pleuritic pain, SOB, sputum production and wheezing. GI: Denies abdominal pain, change in bowel habits, hematochezia, melena, nausea and vomiting. : Denies urinary symptoms including dysuria , urgency, frequency or hematuria. Musculo: Denies musculoskeletal symptoms  Skin: Denies bruising and rash. Neuro: Denies headache, numbness, stiff neck, tingling and focal weakness slurred speech or facial  droop  Hema/Lymph: Denies bleeding/bruising tendency and enlarged lymph nodes        Current Outpatient Medications:     metroNIDAZOLE (METROCREAM) 0.75 % cream, DEJUAN EXT TO FACE D, Disp: , Rfl:     aspirin 81 MG EC tablet, Take 81 mg by mouth daily, Disp: , Rfl:   Allergies   Allergen Reactions    Morphine      Reaction is reddened skin and patient \"blacks out\"    Oxycodone Anxiety    Tomato Anaphylaxis    Alprazolam      Reaction is hallucinations  hallucinations    Hydrocodone-Acetaminophen      GI upset    Influenza Vaccines      ?  Neuro issue and neurologist said not to get again    Oseltamivir     Oxycontin [Oxycodone Hcl]      Reaction is reddened skin and patient \"blacks out\"    Tamiflu [Oseltamivir Phosphate]      Reaction is hallucinations    Adhesive Tape Rash     Reaction is sloughing of skin  Skin comes off    Oseltamivir Phosphate Nausea And Vomiting       Past Medical History:   Diagnosis Date    Blood in urine     history of    Constipation     Endometriosis     Migraines     Raynaud's disease     Superficial phlebitis of leg, right 9/30/2020    Varicose veins of leg with pain, right 9/30/2020     Past Surgical History:   Procedure Laterality Date    ANKLE SURGERY      APPENDECTOMY      CHOLECYSTECTOMY      CYSTOSCOPY      HERNIA REPAIR      LAPAROSCOPY      multiple for endometriosis    TONSILLECTOMY       Family History   Problem Relation Age of Onset    Heart Disease Mother     Thyroid Disease Mother     Other Mother         kidney problems    Heart Disease Father     Allergy (Severe) Father         seasonal    Other Maternal Grandmother         brain tumor    Other Maternal Aunt         aneurysm    Cancer Paternal Cousin         ovarian      Social History     Tobacco Use    Smoking status: Never Smoker    Smokeless tobacco: Never Used   Substance Use Topics    Alcohol use: No     Frequency: Never    Drug use: No      Social History     Social History Narrative    PMH:    Medical Problems:    Endometriosis, Raynauds, Migraine    Surgical Hx:    Appendectomy, Removal of Gallbladder    Laparoscopy - Endometriosis    Hernia Repair - Hiatal    Lt ankle -- several surgeries, cartilage removal    Reviewed, no changes. FH:    Father:    Seasonal Allergies, Heart Disease. Mother:    Heart Disease. paternal cousin ovarian cancer in 42's;    maternal aunt berry aneurysm    maternal grandmother brain tumor    Reviewed, no changes. SH:    Marital: Single. Work Status: Full-Time Employment - - [employer]. Personal Habits: Cigarette Use: Nonsmoker. Alcohol: Never used alcohol. Vitals:    10/27/20 1145   BP: 128/70   Pulse: 81   Resp: 16   Temp: 97.7 °F (36.5 °C)   SpO2: 98%   Weight: 185 lb (83.9 kg)   Height: 5' 5\" (1.651 m)      Wt Readings from Last 3 Encounters:   10/27/20 185 lb (83.9 kg)   10/13/20 182 lb (82.6 kg)   09/30/20 185 lb (83.9 kg)        Physical Exam  Exam:  Const: Appears comfortable. No signs of acute distress present. Head/Face: Atraumatic on inspection. Eyes: EOMI in both eyes. No discharge from the eyes. PERRL. Sclerae clear. ENMT: Auditory canals normal. Tympanic membranes: intact and translucent. External nose WNL. Nasal mucosa is clear. Oropharynx: No erythema or exudate. Posterior pharynx is normal.  Neck: Supple. Palpation reveals no adenopathy. No masses appreciated. No JVD. Carotids: no  bruits. Resp: Respirations are unlabored.  Clear to extensively. Differential reviewed, including serious etiologies. Continue per Dr. Luis Antonio Goldman. Currently on baby aspirin. Counseled other treatment options depending on size and location. Counseled on leg elevation, compression stockings compliance, low-salt diet. Counseled on risk benefits of NSAIDs. Restrictions reviewed. Varicose veins of both lower extremities with inflammation  Counseled. Continue per Dr. Queenie Spear neoplasm  Counseled extensively. Differential reviewed, including serious etiologies. Blood  work negative in past.  Prolactin 10/20 -. Continues to decline repeat imaging including MRI.  Declines seeing endocrinology or neurosurgery.  No longer seeing neurology        Overweight (BMI 30)  Counseled. Risk reviewed. Declines formal intervention. Check blood work.     Allergic rhinitis  May try nasal saline, nasal steroid daily, Allegra or equivalent daily as needed.  Counseled on singular     Health maintenance examination  Health maintenance issues discussed at length  10/20. Encouraged yearly physicals.           Migraine  Counseled extensively. Differential reviewed, including serious etiologies.  She is to follow with neurology.  Used to take Topamax.  Didn't like SE's. Nose runs after migraine. Discussed vasomotor rhinitis. declines gabapentin or otherwise now. Overall states symptoms been stable.  Recommended avoidance of triggers . Counseled on analgesic rebound ha's. higher risk of cva reviewed. signs and symptoms to watch for discussed. Serious signs and symptoms  reviewed. ER if any. She says she typically only gets migraines at work. She is wondering about an excuse not to wear a mask. My concerns with this reviewed, although possible association reviewed, she will discuss with neurologist.  We also discussed anxiety, odors, lighting. We discussed that medical excuses may in fact lead to them not being able to accommodate restrictions.   She will discuss with neurologist.    Hematuria  Counseled extensively. Differential reviewed, including serious etiologies. States neg parr in past through urologist.  Repeat          No flowsheet data found. Plan as above. Counseled extensively and differential diagnoses relevant to above were reviewed, including serious etiologies. Side effects and interactions of medications were reviewed. Unfortunately the lab did not draw the majority of labs ordered, so she is going to go back to the lab fasting after she clears with insurance, she is going to follow-up with me in about 2 weeks, sooner as needed      As long as symptoms steadily improve/resolve, and medical conditions follow the expected course, FU as below, sooner PRN. Return in about 2 weeks (around 11/10/2020), or if symptoms worsen or fail to improve. Signs and symptoms to watch for discussed, serious signs and symptoms reviewed. ER if any. Macy Howard MD    Patients are advised to check with insurance company to ensure coverage and to fully understand benefits and cost prior to any testing. This note was created with the assistance of voice recognition software. Document was reviewed however may contain grammatical errors.

## 2020-10-27 NOTE — ASSESSMENT & PLAN NOTE
Counseled. Check TFTs. We discussed rare syndrome such as feel, carcinoid-declines testing in this regard now, no other symptoms.

## 2020-10-29 ENCOUNTER — HOSPITAL ENCOUNTER (OUTPATIENT)
Age: 50
Discharge: HOME | End: 2020-10-31
Payer: COMMERCIAL

## 2020-10-29 LAB
ALBUMIN SERPL-MCNC: 4.1 G/DL (ref 3.5–5.2)
ALP BLD-CCNC: 102 U/L (ref 35–104)
ALT SERPL-CCNC: 11 U/L (ref 0–32)
ANION GAP SERPL CALCULATED.3IONS-SCNC: 10 MMOL/L (ref 7–16)
AST SERPL-CCNC: 14 U/L (ref 0–31)
BACTERIA: ABNORMAL /HPF
BASOPHILS ABSOLUTE: 0.05 E9/L (ref 0–0.2)
BASOPHILS RELATIVE PERCENT: 0.9 % (ref 0–2)
BILIRUB SERPL-MCNC: 0.5 MG/DL (ref 0–1.2)
BILIRUBIN URINE: NEGATIVE
BLOOD, URINE: NORMAL
BUN BLDV-MCNC: 13 MG/DL (ref 6–20)
CALCIUM SERPL-MCNC: 9.3 MG/DL (ref 8.6–10.2)
CHLORIDE BLD-SCNC: 103 MMOL/L (ref 98–107)
CHOLESTEROL, TOTAL: 165 MG/DL (ref 0–199)
CLARITY: CLEAR
CO2: 27 MMOL/L (ref 22–29)
COLOR: YELLOW
CREAT SERPL-MCNC: 0.7 MG/DL (ref 0.5–1)
EOSINOPHILS ABSOLUTE: 0.38 E9/L (ref 0.05–0.5)
EOSINOPHILS RELATIVE PERCENT: 6.8 % (ref 0–6)
GFR AFRICAN AMERICAN: >60
GFR NON-AFRICAN AMERICAN: >60 ML/MIN/1.73
GLUCOSE BLD-MCNC: 88 MG/DL (ref 74–99)
GLUCOSE URINE: NEGATIVE MG/DL
HCT VFR BLD CALC: 44.6 % (ref 34–48)
HDLC SERPL-MCNC: 74 MG/DL
HEMOGLOBIN: 14.5 G/DL (ref 11.5–15.5)
IMMATURE GRANULOCYTES #: 0.01 E9/L
IMMATURE GRANULOCYTES %: 0.2 % (ref 0–5)
KETONES, URINE: NEGATIVE MG/DL
LDL CHOLESTEROL CALCULATED: 67 MG/DL (ref 0–99)
LEUKOCYTE ESTERASE, URINE: NEGATIVE
LYMPHOCYTES ABSOLUTE: 1.89 E9/L (ref 1.5–4)
LYMPHOCYTES RELATIVE PERCENT: 33.9 % (ref 20–42)
MCH RBC QN AUTO: 29.4 PG (ref 26–35)
MCHC RBC AUTO-ENTMCNC: 32.5 % (ref 32–34.5)
MCV RBC AUTO: 90.3 FL (ref 80–99.9)
MONOCYTES ABSOLUTE: 0.31 E9/L (ref 0.1–0.95)
MONOCYTES RELATIVE PERCENT: 5.6 % (ref 2–12)
NEUTROPHILS ABSOLUTE: 2.94 E9/L (ref 1.8–7.3)
NEUTROPHILS RELATIVE PERCENT: 52.6 % (ref 43–80)
NITRITE, URINE: NEGATIVE
PDW BLD-RTO: 13.7 FL (ref 11.5–15)
PH UA: 8 (ref 5–9)
PLATELET # BLD: 281 E9/L (ref 130–450)
PMV BLD AUTO: 9.8 FL (ref 7–12)
POTASSIUM SERPL-SCNC: 4.4 MMOL/L (ref 3.5–5)
PROTEIN UA: NEGATIVE MG/DL
RBC # BLD: 4.94 E12/L (ref 3.5–5.5)
RBC UA: ABNORMAL /HPF (ref 0–2)
SODIUM BLD-SCNC: 140 MMOL/L (ref 132–146)
SPECIFIC GRAVITY UA: 1.02 (ref 1–1.03)
T4 FREE: 1.05 NG/DL (ref 0.93–1.7)
TOTAL PROTEIN: 6.5 G/DL (ref 6.4–8.3)
TRIGL SERPL-MCNC: 121 MG/DL (ref 0–149)
TSH SERPL DL<=0.05 MIU/L-ACNC: 1.43 UIU/ML (ref 0.27–4.2)
UROBILINOGEN, URINE: 0.2 E.U./DL
VLDLC SERPL CALC-MCNC: 24 MG/DL
WBC # BLD: 5.6 E9/L (ref 4.5–11.5)
WBC UA: ABNORMAL /HPF (ref 0–5)

## 2020-10-29 PROCEDURE — 81001 URINALYSIS AUTO W/SCOPE: CPT

## 2020-10-29 PROCEDURE — 84439 ASSAY OF FREE THYROXINE: CPT

## 2020-10-29 PROCEDURE — 86803 HEPATITIS C AB TEST: CPT

## 2020-10-29 PROCEDURE — 87340 HEPATITIS B SURFACE AG IA: CPT

## 2020-10-29 PROCEDURE — 80053 COMPREHEN METABOLIC PANEL: CPT

## 2020-10-29 PROCEDURE — 86706 HEP B SURFACE ANTIBODY: CPT

## 2020-10-29 PROCEDURE — 84443 ASSAY THYROID STIM HORMONE: CPT

## 2020-10-29 PROCEDURE — 80061 LIPID PANEL: CPT

## 2020-10-29 PROCEDURE — 86708 HEPATITIS A ANTIBODY: CPT

## 2020-10-29 PROCEDURE — 36415 COLL VENOUS BLD VENIPUNCTURE: CPT

## 2020-10-29 PROCEDURE — 85025 COMPLETE CBC W/AUTO DIFF WBC: CPT

## 2020-10-29 PROCEDURE — 86709 HEPATITIS A IGM ANTIBODY: CPT

## 2020-10-30 LAB
HAV IGM SER IA-ACNC: NORMAL
HBV SURFACE AB TITR SER: NORMAL {TITER}
HEPATITIS B SURFACE ANTIGEN INTERPRETATION: NORMAL
HEPATITIS C ANTIBODY INTERPRETATION: NORMAL

## 2020-10-30 NOTE — RESULT ENCOUNTER NOTE
Trace blood in urine, negative hepatitis A/B/C, nonimmune to hepatitis Bimmunity status for hep A pending. There are vaccines available.   Keep follow-up, sooner as needed

## 2020-11-01 LAB — HAV AB SERPL IA-ACNC: NEGATIVE

## 2020-11-10 ENCOUNTER — OFFICE VISIT (OUTPATIENT)
Dept: PRIMARY CARE CLINIC | Age: 50
End: 2020-11-10
Payer: COMMERCIAL

## 2020-11-10 VITALS
HEART RATE: 71 BPM | TEMPERATURE: 97.4 F | SYSTOLIC BLOOD PRESSURE: 122 MMHG | DIASTOLIC BLOOD PRESSURE: 70 MMHG | OXYGEN SATURATION: 99 % | RESPIRATION RATE: 18 BRPM

## 2020-11-10 PROBLEM — R68.89 HEAT INTOLERANCE: Status: RESOLVED | Noted: 2020-10-27 | Resolved: 2020-11-10

## 2020-11-10 PROBLEM — M79.604 PAIN IN RIGHT LEG: Status: RESOLVED | Noted: 2020-08-31 | Resolved: 2020-11-10

## 2020-11-10 PROBLEM — H68.002: Status: RESOLVED | Noted: 2019-11-25 | Resolved: 2020-11-10

## 2020-11-10 PROBLEM — T14.8XXA SPLINTER IN SKIN: Status: RESOLVED | Noted: 2019-11-25 | Resolved: 2020-11-10

## 2020-11-10 PROBLEM — M70.41 PREPATELLAR BURSITIS OF RIGHT KNEE: Status: RESOLVED | Noted: 2020-08-31 | Resolved: 2020-11-10

## 2020-11-10 PROCEDURE — 99214 OFFICE O/P EST MOD 30 MIN: CPT | Performed by: FAMILY MEDICINE

## 2020-11-10 NOTE — PROGRESS NOTES
8/15/19  Sherri Saez : 1970 Sex: female  Age: 45 y.o. Chief Complaint   Patient presents with    Discuss Labs       HPI:      Patient presents today to review labs. Generally speaking feels well. She does feel like her superficial blood clot moved distal but states it is in a better area and that it was more uncomfortable where it was, feeling better. Labs reviewed, CMP normal, HDL 74 LDL 67 triglyceride 121, glucose 88 TSH 1.430 free T4 1.05 CBC grossly normal eosinophil 6.8 hepatitis A/B/C-, not immune to A/B and declines immunization at this time. Trace blood on dipstick, 1-3 RBC microscopically. Asymptomatic.   Most Recent Labs    CBC  Lab Results   Component Value Date    WBC 5.6 10/29/2020    WBC 6.2 2020    WBC 5.7 2020    RBC 4.94 10/29/2020    RBC 5.04 2020    RBC 4.67 2020    HGB 14.5 10/29/2020    HGB 14.4 2020    HGB 13.6 2020    HCT 44.6 10/29/2020    HCT 45.4 2020    HCT 41.3 2020    MCV 90.3 10/29/2020    MCV 90.1 2020    MCV 88.4 2020     10/29/2020     2020     2020      CMP  Lab Results   Component Value Date     10/29/2020     2020     2020    K 4.4 10/29/2020    K 3.4 2020    K 3.5 2020     10/29/2020     2020     2020    CO2 27 10/29/2020    CO2 27 2020    CO2 25 2020    ANIONGAP 10 10/29/2020    ANIONGAP 11 2020    ANIONGAP 10 2020    GLUCOSE 88 10/29/2020    GLUCOSE 90 2020    GLUCOSE 96 2020    BUN 13 10/29/2020    BUN 7 2020    BUN 13 2020    CREATININE 0.7 10/29/2020    CREATININE 0.7 2020    CREATININE 0.7 2020    LABGLOM >60 10/29/2020    LABGLOM >60 2020    LABGLOM >60 2020    GFRAA >60 10/29/2020    GFRAA >60 2020    GFRAA >60 2020    CALCIUM 9.3 10/29/2020    CALCIUM 9.0 2020    CALCIUM 9.5 2020    PROT 6.5 10/29/2020    PROT 6.7 02/19/2020    PROT 7.1 02/13/2020    LABALBU 4.1 10/29/2020    LABALBU 4.5 02/19/2020    LABALBU 4.8 02/13/2020    BILITOT 0.5 10/29/2020    BILITOT 0.5 02/19/2020    BILITOT 1.2 02/13/2020    ALKPHOS 102 10/29/2020    ALKPHOS 90 02/19/2020    ALKPHOS 111 02/13/2020    AST 14 10/29/2020     02/19/2020    AST 20 02/13/2020    ALT 11 10/29/2020    ALT 75 02/19/2020    ALT 16 02/13/2020     A1C  No results found for: LABA1C  TSH  Lab Results   Component Value Date    TSH 1.430 10/29/2020    TSH 1.600 08/19/2019    TSH 1.160 02/24/2015     FREET4  No results found for: P3ITVKY  LIPID  Lab Results   Component Value Date    CHOL 165 10/29/2020    CHOL 176 08/19/2019    CHOL 132 02/24/2015    HDL 74 10/29/2020    HDL 77 08/19/2019    HDL 70 02/24/2015    LDLCALC 67 10/29/2020    LDLCALC 85 08/19/2019    LDLCALC 50 02/24/2015    TRIG 121 10/29/2020    TRIG 71 08/19/2019    TRIG 61 02/24/2015     VITAMIN D  No results found for: VITD25  MAGNESIUM  No results found for: MG   PHOS  No results found for: PHOS   JAZMYNE   Lab Results   Component Value Date    JAZMYNE NEGATIVE 08/19/2019     RHEUMATOID FACTOR  Lab Results   Component Value Date    RF 10 08/19/2019     PSA  No results found for: PSA   HEPATITIS C  Lab Results   Component Value Date    HCVABI Non-Reactive 10/29/2020     HIV  No results found for: VUF1SLU, HIV1QT  UA  Lab Results   Component Value Date    COLORU Yellow 10/29/2020    COLORU Yellow 02/19/2020    COLORU yellow 02/19/2020    COLORU Yellow 02/13/2020    CLARITYU Clear 10/29/2020    CLARITYU Clear 02/19/2020    CLARITYU clean 02/19/2020    CLARITYU Clear 02/13/2020    GLUCOSEU Negative 10/29/2020    GLUCOSEU Negative 02/19/2020    GLUCOSEU neg 02/19/2020    GLUCOSEU Negative 02/13/2020    BILIRUBINUR Negative 10/29/2020    BILIRUBINUR Negative 02/19/2020    BILIRUBINUR neg 02/19/2020    BILIRUBINUR Negative 02/13/2020    BILIRUBINUR neg 12/28/2019    BILIRUBINUR neg 07/19/2019 KETUA Negative 10/29/2020    KETUA Negative 02/19/2020    KETUA 15mg 02/19/2020    KETUA Negative 02/13/2020    SPECGRAV 1.020 10/29/2020    SPECGRAV <=1.005 02/19/2020    SPECGRAV 1.030 02/19/2020    SPECGRAV >=1.030 02/13/2020    BLOODU TRACE-INTACT 10/29/2020    BLOODU TRACE-INTACT 02/19/2020    BLOODU moderate 02/19/2020    BLOODU LARGE 02/13/2020    PHUR 8.0 10/29/2020    PHUR 6.5 02/19/2020    PHUR 5.5 02/19/2020    PHUR 5.5 02/13/2020    PROTEINU Negative 10/29/2020    PROTEINU Negative 02/19/2020    PROTEINU neg 02/19/2020    PROTEINU Negative 02/13/2020    UROBILINOGEN 0.2 10/29/2020    UROBILINOGEN 0.2 02/19/2020    UROBILINOGEN 0.2 02/13/2020    NITRU Negative 10/29/2020    NITRU Negative 02/19/2020    NITRU Negative 02/13/2020    LEUKOCYTESUR Negative 10/29/2020    LEUKOCYTESUR Negative 02/19/2020    LEUKOCYTESUR small 02/19/2020    LEUKOCYTESUR Negative 02/13/2020     Urine Micro/Albumin Ratio  No results found for: MALBCR            ROS:  Const: Denies chills, fever, malaise and sweats. Eyes: Denies discharge, pain, redness and visual disturbance. ENMT: Denies earaches, other ear symptoms. Denies nasal or sinus symptoms other than stated  above. Denies mouth and tongue lesions and sore throat. CV: Denies chest discomfort, pain; diaphoresis, dizziness, edema, lightheadedness, orthopnea,  palpitations, syncope and near syncopal episode or any exertional symptoms  Resp: Denies cough, hemoptysis, pleuritic pain, SOB, sputum production and wheezing. GI: Denies abdominal pain, change in bowel habits, hematochezia, melena, nausea and vomiting. : Denies urinary symptoms including dysuria , urgency, frequency or hematuria. Musculo: Denies musculoskeletal symptoms  Skin: Denies bruising and rash.   Neuro: Denies headache, numbness, stiff neck, tingling and focal weakness slurred speech or facial  droop  Hema/Lymph: Denies bleeding/bruising tendency and enlarged lymph nodes        Current Outpatient Medications:     metroNIDAZOLE (METROCREAM) 0.75 % cream, DEJUAN EXT TO FACE D, Disp: , Rfl:     aspirin 81 MG EC tablet, Take 81 mg by mouth daily, Disp: , Rfl:   Allergies   Allergen Reactions    Morphine      Reaction is reddened skin and patient \"blacks out\"    Oxycodone Anxiety    Tomato Anaphylaxis    Alprazolam      Reaction is hallucinations  hallucinations    Hydrocodone-Acetaminophen      GI upset    Influenza Vaccines      ?  Neuro issue and neurologist said not to get again    Oseltamivir     Oxycontin [Oxycodone Hcl]      Reaction is reddened skin and patient \"blacks out\"    Tamiflu [Oseltamivir Phosphate]      Reaction is hallucinations    Adhesive Tape Rash     Reaction is sloughing of skin  Skin comes off    Oseltamivir Phosphate Nausea And Vomiting       Past Medical History:   Diagnosis Date    Blood in urine     history of    Constipation     Endometriosis     Migraines     Raynaud's disease     Superficial phlebitis of leg, right 9/30/2020    Varicose veins of leg with pain, right 9/30/2020     Past Surgical History:   Procedure Laterality Date    ANKLE SURGERY      APPENDECTOMY      CHOLECYSTECTOMY      CYSTOSCOPY      HERNIA REPAIR      LAPAROSCOPY      multiple for endometriosis    TONSILLECTOMY       Family History   Problem Relation Age of Onset    Heart Disease Mother     Thyroid Disease Mother     Other Mother         kidney problems    Heart Disease Father     Allergy (Severe) Father         seasonal    Other Maternal Grandmother         brain tumor    Other Maternal Aunt         aneurysm    Cancer Paternal Cousin         ovarian      Social History     Tobacco Use    Smoking status: Never Smoker    Smokeless tobacco: Never Used   Substance Use Topics    Alcohol use: No     Frequency: Never    Drug use: No      Social History     Social History Narrative    PMH:    Medical Problems:    Endometriosis, Raynauds, Migraine    Surgical Hx:    Appendectomy, Removal of Gallbladder    Laparoscopy - Endometriosis    Hernia Repair - Hiatal    Lt ankle -- several surgeries, cartilage removal    Reviewed, no changes. FH:    Father:    Seasonal Allergies, Heart Disease. Mother:    Heart Disease. paternal cousin ovarian cancer in 42's;    maternal aunt berry aneurysm    maternal grandmother brain tumor    Reviewed, no changes. SH:    Marital: Single. Work Status: Full-Time Employment - - [employer]. Personal Habits: Cigarette Use: Nonsmoker. Alcohol: Never used alcohol. Vitals:    11/10/20 1107   BP: 122/70   Pulse: 71   Resp: 18   Temp: 97.4 °F (36.3 °C)   TempSrc: Temporal   SpO2: 99%      Wt Readings from Last 3 Encounters:   10/27/20 185 lb (83.9 kg)   10/13/20 182 lb (82.6 kg)   09/30/20 185 lb (83.9 kg)        Physical Exam  Exam:  Const: Appears comfortable. No signs of acute distress present. Head/Face: Atraumatic on inspection. Eyes: EOMI in both eyes. No discharge from the eyes. PERRL. Sclerae clear. ENMT: Auditory canals normal. Tympanic membranes: intact and translucent. External nose WNL. Nasal mucosa is clear. Oropharynx: No erythema or exudate. Posterior pharynx is normal.  Neck: Supple. Palpation reveals no adenopathy. No masses appreciated. No JVD. Carotids: no  bruits. Resp: Respirations are unlabored. Clear to auscultation. No rales, rhonchi or wheezes appreciated  over the lungs bilaterally. CV: Rate is regular. Rhythm is regular. No gallop or rubs. No heart murmur appreciated. Extremities: No clubbing, cyanosis, or edema. No calf inflammation or tenderness. Exam overall negative. Varicose veins noted but not inflamed in my opinion  Abdomen: Bowel sounds are normoactive. Abdomen is soft, nontender, and nondistended. No  abdominal masses. No palpable hepatosplenomegaly. Lymph: No palpable or visible regional lymphadenopathy. Musculoskeletal: no acute joint inflammation. Knee exam overall unremarkable at this time.     Skin: Dry and warm with no rash. Skin normal to inspection and palpation overall. Neuro: Alert and oriented. Affect: appropriate. Upper Extremities: 5/5 bilaterally. Lower Extremities:  5/5 bilaterally. Sensation intact to light touch. Reflexes: DTR's are symmetric and 2+ bilaterally. .  Cranial Nerves: Cranial nerves grossly intact. Assessment and Plan:   Diagnosis Orders   1. Superficial phlebitis of leg, right  US DUP LOWER EXTREMITY RIGHT PAYTON   2. Hematuria, unspecified type  Urinalysis   3. Health maintenance examination  Lipid Panel    TSH without Reflex    Comprehensive Metabolic Panel    CBC Auto Differential   4. Other migraine without status migrainosus, not intractable     5. Allergic rhinitis due to other allergic trigger, unspecified seasonality     6. Pituitary neoplasm     7. Varicose veins of both lower extremities with inflammation     8. Elevated liver enzymes         Elevated liver enzymes  Counseled extensively. Differential reviewed. I noticed this from blood work drawn in the ER 2/20. Resolved/normalized. Hepatitis A/B/C- as of 11/2020; not immune to hepatitis A/B-defers vaccine at this time           Superficial phlebitis  Counseled extensively. Differential reviewed, including serious etiologies. Continue per Dr. Paz Black. She should follow-up. Currently on baby aspirin. Counseled other treatment options depending on size and location. Counseled on leg elevation, compression stockings compliance, low-salt diet. Counseled on risk benefits of NSAIDs. Restrictions reviewed. She feels like it moved distally and would like a repeat ultrasound. Varicose veins of both lower extremities with inflammation  Counseled. Continue per Dr. Ismael Carrero neoplasm  Counseled extensively. Differential reviewed, including serious etiologies. Blood  work negative in past.  Prolactin 10/20 was negative.   Continues to decline repeat imaging including MRI.  Declines seeing endocrinology or neurosurgery.  No longer seeing neurology      MRI 3/16 again. Summary was     Again I recommended she consider seeing at least an endocrinologist and at least getting repeat imaging but declines now. Denies symptoms. Prolactin remains normal         Overweight (BMI 30)  Counseled. Risk reviewed. Declines formal intervention. Check blood work.     Allergic rhinitis  May try nasal saline, nasal steroid daily, Allegra or equivalent daily as needed.  Counseled on singular     Health maintenance examination  Health maintenance issues discussed at length  10/20. Encouraged yearly physicals. Encouraged shingrix. FIT cards are due After 11/14/2020; mammogram 10/20 -          Migraine  Counseled extensively. Differential reviewed, including serious etiologies.  She is to follow with neurology.  Used to take Topamax.  Didn't like SE's. Nose runs after migraine. Discussed vasomotor rhinitis. declines gabapentin or otherwise now. Overall states symptoms been stable.  Recommended avoidance of triggers . Counseled on analgesic rebound ha's. higher risk of cva reviewed. signs and symptoms to watch for discussed. Serious signs and symptoms  reviewed. ER if any. Continue per neurology    Hematuria  Counseled extensively. Differential reviewed, including serious etiologies. States neg parr in past through urologist.  Declines follow-up unless symptoms        No flowsheet data found. Plan as above. Counseled extensively and differential diagnoses relevant to above were reviewed, including serious etiologies. Side effects and interactions of medications were reviewed. Counseled. Check ultrasound. Continue per specialist.  Otherwise going to get blood work and follow-up yearly physical sooner as needed. Precautions reviewed. As long as symptoms steadily improve/resolve, and medical conditions follow the expected course, FU as below, sooner PRN.     Return in about 1 year (around 11/10/2021), or if symptoms worsen or fail to improve, for physical.         Signs and symptoms to watch for discussed, serious signs and symptoms reviewed. ER if any. Lemuel Kenny MD    Patients are advised to check with insurance company to ensure coverage and to fully understand benefits and cost prior to any testing. This note was created with the assistance of voice recognition software. Document was reviewed however may contain grammatical errors.

## 2020-11-24 ENCOUNTER — TELEPHONE (OUTPATIENT)
Dept: VASCULAR SURGERY | Age: 50
End: 2020-11-24

## 2020-11-25 ENCOUNTER — OFFICE VISIT (OUTPATIENT)
Dept: VASCULAR SURGERY | Age: 50
End: 2020-11-25
Payer: COMMERCIAL

## 2020-11-25 PROCEDURE — 99213 OFFICE O/P EST LOW 20 MIN: CPT | Performed by: SURGERY

## 2020-11-25 NOTE — PROGRESS NOTES
Chief Complaint:   Chief Complaint   Patient presents with    Circulatory Problem     Follow up DVT (R) leg. HPI: Patient came to the office, for the evaluation of abscess in his right leg, it did have episode of superficial thrombophlebitis of the medial asp right leg just below the knee at the knee level, seen by me, 2 months ago, and the patient had always had varicose veins right leg for many years that is slowly getting worse, venous ultrasounds were done at the time revealed no evidence of deep vein thrombosis. Patient tells me that she did undergo repeat venous ultrasound today again ordered by her PCP, revealed again no evidence of deep vein thrombosis    Patient overall doing better, does wear stockings, slight some discomfort over the area of resolving superficial thrombophlebitis      Patient denies any focal lateralizing neurological symptoms like loss of speech, vision or loss of function of extremity    Patient can walk a few blocks , and denies any symptoms of rest pain    Allergies   Allergen Reactions    Morphine      Reaction is reddened skin and patient \"blacks out\"    Oxycodone Anxiety    Tomato Anaphylaxis    Alprazolam      Reaction is hallucinations  hallucinations    Hydrocodone-Acetaminophen      GI upset    Influenza Vaccines      ? Neuro issue and neurologist said not to get again    Oseltamivir     Oxycontin [Oxycodone Hcl]      Reaction is reddened skin and patient \"blacks out\"    Tamiflu [Oseltamivir Phosphate]      Reaction is hallucinations    Adhesive Tape Rash     Reaction is sloughing of skin  Skin comes off    Oseltamivir Phosphate Nausea And Vomiting       Current Outpatient Medications   Medication Sig Dispense Refill    aspirin 81 MG EC tablet Take 81 mg by mouth daily      metroNIDAZOLE (METROCREAM) 0.75 % cream DEJUAN EXT TO FACE D       No current facility-administered medications for this visit.         Past Medical History:   Diagnosis Date    Blood in urine     history of    Constipation     Endometriosis     Migraines     Raynaud's disease     Superficial phlebitis of leg, right 9/30/2020    Varicose veins of leg with pain, right 9/30/2020       Past Surgical History:   Procedure Laterality Date    ANKLE SURGERY      APPENDECTOMY      CHOLECYSTECTOMY      CYSTOSCOPY      HERNIA REPAIR      LAPAROSCOPY      multiple for endometriosis    TONSILLECTOMY         Family History   Problem Relation Age of Onset    Heart Disease Mother     Thyroid Disease Mother     Other Mother         kidney problems    Heart Disease Father     Allergy (Severe) Father         seasonal    Other Maternal Grandmother         brain tumor    Other Maternal Aunt         aneurysm    Cancer Paternal Cousin         ovarian        Social History     Socioeconomic History    Marital status: Single     Spouse name: Not on file    Number of children: Not on file    Years of education: Not on file    Highest education level: Not on file   Occupational History    Not on file   Social Needs    Financial resource strain: Not on file    Food insecurity     Worry: Not on file     Inability: Not on file    Transportation needs     Medical: Not on file     Non-medical: Not on file   Tobacco Use    Smoking status: Never Smoker    Smokeless tobacco: Never Used   Substance and Sexual Activity    Alcohol use: No     Frequency: Never    Drug use: No    Sexual activity: Not on file   Lifestyle    Physical activity     Days per week: Not on file     Minutes per session: Not on file    Stress: Not on file   Relationships    Social connections     Talks on phone: Not on file     Gets together: Not on file     Attends Spiritism service: Not on file     Active member of club or organization: Not on file     Attends meetings of clubs or organizations: Not on file     Relationship status: Not on file    Intimate partner violence     Fear of current or ex partner: Not on file Emotionally abused: Not on file     Physically abused: Not on file     Forced sexual activity: Not on file   Other Topics Concern    Not on file   Social History Narrative    PMH:    Medical Problems:    Endometriosis, Raynauds, Migraine    Surgical Hx:    Appendectomy, Removal of Gallbladder    Laparoscopy - Endometriosis    Hernia Repair - Hiatal    Lt ankle -- several surgeries, cartilage removal    Reviewed, no changes. FH:    Father:    Seasonal Allergies, Heart Disease. Mother:    Heart Disease. paternal cousin ovarian cancer in 42's;    maternal aunt berry aneurysm    maternal grandmother brain tumor    Reviewed, no changes. SH:    Marital: Single. Work Status: Full-Time Employment - - [employer]. Personal Habits: Cigarette Use: Nonsmoker. Alcohol: Never used alcohol. Review of Systems:    Eyes:  No blurring, diplopia or vision loss. Respiratory:  No cough, pleuritic chest pain, dyspnea, or wheezing. Cardiovascular: No angina, palpitations . Musculoskeletal:  No arthritis or weakness. Neurologic:  No paralysis, paresis, paresthesia, seizures or headache. Physical Exam:  General appearance:  Alert, awake, oriented x 3. No distress. Eyes:  Conjunctivae appear normal; PERRL  Neck:  No jugular venous distention, lymphadenopathy or thyromegaly. No evidence of carotid bruit  Lungs:  Clear to ausculation bilaterally. No rhonchi, crackles, wheezes  Heart:  Regular rate and rhythm. No rub or murmur  Abdomen:  Soft, non-tender. No masses, organomegaly. Musculoskeletal : No joint effusions, tenderness swelling    Neuro: Speech is intact. Moving all extremities. No focal motor or sensory deficits      Extremities:  Both feet are warm to touch.  The color of both feet is normal.    Patient does have varicose veins of the right leg of moderate severity particularly over the anterior medial aspect of the right thigh medial aspect right knee and the calf as well as lateral aspect of the calf with mild ankle edema    Area of resolving superficial thrombophlebitis minimal tenderness noted without erythema at the previous site noted, 2 months ago much improved    Pulses Right  Left    Brachial 3 3    Radial    3=normal   Femoral 2 2  2=diminished   Popliteal    1=barely palpable   Dorsalis pedis 2 2  0=absent   Posterior tibial    4=aneurysmal             Other pertinent information:1. The past medical records were reviewed. 2.  The venous ultrasound study done on 10 November again revealed no evidence of deep vein thrombosis, other area of superficial thrombophlebitis    Assessment:    1. Varicose veins of leg with pain, right    2. Superficial phlebitis of leg, right              Plan:       Discussed the patient, options risks benefits ulcers explained, patient was reassured, recommend consider therapy, continue the stockings, call me as needed if the veins are causing symptoms in spite of the compression stocking at the time, consideration could be given for the treatment, surgical excision of symptomatic varicose veins, all her questions were answered         All the questions were answered. Indicated follow-up: Return if symptoms worsen or fail to improve.

## 2020-11-26 PROBLEM — Z00.00 HEALTH MAINTENANCE EXAMINATION: Status: RESOLVED | Noted: 2019-10-09 | Resolved: 2020-11-26

## 2021-04-28 ENCOUNTER — TELEPHONE (OUTPATIENT)
Dept: VASCULAR SURGERY | Age: 51
End: 2021-04-28

## 2021-04-28 ENCOUNTER — NURSE TRIAGE (OUTPATIENT)
Dept: OTHER | Facility: CLINIC | Age: 51
End: 2021-04-28

## 2021-04-28 NOTE — TELEPHONE ENCOUNTER
Reason for Disposition   [1] Caller requesting NON-URGENT health information AND [2] PCP's office is the best resource    Protocols used: INFORMATION ONLY CALL - NO TRIAGE-ADULT-AH  Have you ever had an allergic reaction to another  vaccine (other than COVID-19 vaccine) or another  injectable medication? A history of any immediate allergic reaction to any other  vaccine or injectable therapy (i.e., intramuscular, intravenous, or  subcutaneous vaccines or therapies not related to a component  of COVID-19 vaccines) is a precaution to currently authorized  COVID-19 vaccines. Vaccine may be given, but  patients    [facility] [place] [address] [facility] Brief description of triage: no triage    Triage indicates for patient to contact pcp, [facility] website    Care advice provided. Recommended using local department of health website for testing locations and up to date information. Caller verbalizes understanding, denies any other questions or concerns; instructed to call back for any new or worsening symptoms.

## 2021-04-29 ENCOUNTER — OFFICE VISIT (OUTPATIENT)
Dept: VASCULAR SURGERY | Age: 51
End: 2021-04-29
Payer: COMMERCIAL

## 2021-04-29 VITALS — HEIGHT: 65 IN | BODY MASS INDEX: 30.82 KG/M2 | WEIGHT: 185 LBS

## 2021-04-29 DIAGNOSIS — Z86.79 H/O SUPERFICIAL PHLEBITIS: ICD-10-CM

## 2021-04-29 DIAGNOSIS — I83.811 VARICOSE VEINS OF LEG WITH PAIN, RIGHT: Primary | ICD-10-CM

## 2021-04-29 PROBLEM — R31.9 HEMATURIA: Status: RESOLVED | Noted: 2019-10-09 | Resolved: 2021-04-29

## 2021-04-29 PROBLEM — I80.01 SUPERFICIAL PHLEBITIS OF LEG, RIGHT: Status: RESOLVED | Noted: 2020-09-30 | Resolved: 2021-04-29

## 2021-04-29 PROCEDURE — 99214 OFFICE O/P EST MOD 30 MIN: CPT | Performed by: SURGERY

## 2021-04-29 NOTE — PROGRESS NOTES
Chief Complaint:   Chief Complaint   Patient presents with    Circulatory Problem     rt leg DVT         HPI: Patient came to the office for evaluation of vascular status of the right leg, does have varicose veins of right leg for many years, slowly getting worse, has tried compression stockings both knee-high and thigh-high, it did not help her and currently still wearing some stockings    Patient developed an episode of superficial thrombophlebitis of the varicose vein at the knee joint just below the patella on the medial aspect, and recently did undergo venous ultrasound study under the supervision of her PCP, resolving superficial thrombophlebitis of the right leg, no evidence of deep vein thrombosis    Patient have leg pain and discomfort from varicose veins and also the knees and wants something done for the symptomatic varicose veins    Patient denies any major health issues      Patient denies any focal lateralizing neurological symptoms like loss of speech, vision or loss of function of extremity    Patient can walk a few blocks , and denies any symptoms of rest pain    Allergies   Allergen Reactions    Morphine      Reaction is reddened skin and patient \"blacks out\"    Oxycodone Anxiety    Tomato Anaphylaxis    Alprazolam      Reaction is hallucinations  hallucinations    Hydrocodone-Acetaminophen      GI upset    Influenza Vaccines      ? Neuro issue and neurologist said not to get again    Oseltamivir     Oxycontin [Oxycodone Hcl]      Reaction is reddened skin and patient \"blacks out\"    Tamiflu [Oseltamivir Phosphate]      Reaction is hallucinations    Adhesive Tape Rash     Reaction is sloughing of skin  Skin comes off    Oseltamivir Phosphate Nausea And Vomiting       Current Outpatient Medications   Medication Sig Dispense Refill    aspirin 81 MG EC tablet Take 81 mg by mouth daily       No current facility-administered medications for this visit.         Past Medical History: Diagnosis Date    Blood in urine     history of    Constipation     Endometriosis     H/O superficial phlebitis 4/29/2021    Migraines     Raynaud's disease     Superficial phlebitis of leg, right 9/30/2020    Varicose veins of leg with pain, right 9/30/2020       Past Surgical History:   Procedure Laterality Date    ANKLE SURGERY      APPENDECTOMY      CHOLECYSTECTOMY      CYSTOSCOPY      HERNIA REPAIR      LAPAROSCOPY      multiple for endometriosis    TONSILLECTOMY         Family History   Problem Relation Age of Onset    Heart Disease Mother     Thyroid Disease Mother     Other Mother         kidney problems    Heart Disease Father     Allergy (Severe) Father         seasonal    Other Maternal Grandmother         brain tumor    Other Maternal Aunt         aneurysm    Cancer Paternal Cousin         ovarian        Social History     Socioeconomic History    Marital status: Single     Spouse name: Not on file    Number of children: Not on file    Years of education: Not on file    Highest education level: Not on file   Occupational History    Not on file   Social Needs    Financial resource strain: Not on file    Food insecurity     Worry: Not on file     Inability: Not on file    Transportation needs     Medical: Not on file     Non-medical: Not on file   Tobacco Use    Smoking status: Never Smoker    Smokeless tobacco: Never Used   Substance and Sexual Activity    Alcohol use: No     Frequency: Never    Drug use: No    Sexual activity: Not on file   Lifestyle    Physical activity     Days per week: Not on file     Minutes per session: Not on file    Stress: Not on file   Relationships    Social connections     Talks on phone: Not on file     Gets together: Not on file     Attends [employer] service: Not on file     Active member of club or organization: Not on file     Attends meetings of clubs or organizations: Not on file     Relationship status: Not on file    Intimate partner violence     Fear of current or ex partner: Not on file     Emotionally abused: Not on file     Physically abused: Not on file     Forced sexual activity: Not on file   Other Topics Concern    Not on file   Social History Narrative    PMH:    Medical Problems:    Endometriosis, Raynauds, Migraine    Surgical Hx:    Appendectomy, Removal of Gallbladder    Laparoscopy - Endometriosis    Hernia Repair - Hiatal    Lt ankle -- several surgeries, cartilage removal    Reviewed, no changes. FH:    Father:    Seasonal Allergies, Heart Disease. Mother:    Heart Disease. paternal cousin ovarian cancer in 42's;    maternal aunt berry aneurysm    maternal grandmother brain tumor    Reviewed, no changes. SH:    Marital: Single. Work Status: Full-Time Employment - - [employer]. Personal Habits: Cigarette Use: Nonsmoker. Alcohol: Never used alcohol. Review of Systems:    Eyes:  No blurring, diplopia or vision loss. Respiratory:  No cough, pleuritic chest pain, dyspnea, or wheezing. Cardiovascular: No angina, palpitations . Musculoskeletal:  No arthritis or weakness. Neurologic:  No paralysis, paresis, paresthesia, seizures or headache. Endocrinology:           Physical Exam:  General appearance:  Alert, awake, oriented x 3. No distress. Eyes:  Conjunctivae appear normal; PERRL  Neck:  No jugular venous distention, lymphadenopathy or thyromegaly. No evidence of carotid bruit  Lungs:  Clear to ausculation bilaterally. No rhonchi, crackles, wheezes  Heart:  Regular rate and rhythm. No rub or murmur  Abdomen:  Soft, non-tender. No masses, organomegaly. Musculoskeletal : No joint effusions, tenderness swelling    Neuro: Speech is intact. Moving all extremities. No focal motor or sensory deficits      Extremities:  Both feet are warm to touch.  The color of both feet is normal.  .  Patient does have moderately severe varicose veins over the medial aspect of right distal thigh, knee and the calf with an area of improved resolving superficial thrombophlebitis just below the knee on the right side associated with a mild ankle edema without any tenderness of the calf      Pulses Right  Left    Brachial 3 3    Radial    3=normal   Femoral 2 2  2=diminished   Popliteal    1=barely palpable   Dorsalis pedis 2-3 2-3  0=absent   Posterior tibial    4=aneurysmal             Other pertinent information:1. The past medical records were reviewed. 2.  The venous ultrasound study that was done recently revealed no evidence of deep vein thrombosis, however no reflux studies were done at the saphenofemoral junction for venous incompetency and reflux      Assessment:    1. Varicose veins of leg with pain, right    2. H/O superficial phlebitis              Plan:       I had a long detailed discussion the patient regarding all options, risks benefits and alternatives    As the patient does have symptomatic varicose veins, and wants something done, are symptomatic in spite of a trial of stockings, as the last 2 venous ultrasounds revealed no evidence of deep vein thrombosis but no venous insufficiency testing was done, patient recommended repeat venous ultrasound, with special attention for any reflux of the greater saphenous vein particularly saphenofemoral junction    Also some of the pain she is having the knee joint area could become from osteoarthritis and not from the actual veins    I find recommendation made once the venous ultrasound study is completed and was instead call me. If she had an increase in the pain or swelling of the leg in the future, in interim continue the low-dose aspirin         All the questions were answered. Orders Placed This Encounter   Procedures    US LOWER EXTREMITY RIGHT VEIN MAPPING W DVT             Indicated follow-up: Return if symptoms worsen or fail to improve.

## 2021-04-30 ENCOUNTER — OFFICE VISIT (OUTPATIENT)
Dept: PRIMARY CARE CLINIC | Age: 51
End: 2021-04-30
Payer: COMMERCIAL

## 2021-04-30 VITALS
TEMPERATURE: 97.1 F | HEART RATE: 61 BPM | HEIGHT: 65 IN | DIASTOLIC BLOOD PRESSURE: 72 MMHG | OXYGEN SATURATION: 97 % | BODY MASS INDEX: 30.16 KG/M2 | WEIGHT: 181 LBS | SYSTOLIC BLOOD PRESSURE: 112 MMHG

## 2021-04-30 DIAGNOSIS — G47.00 INSOMNIA, UNSPECIFIED TYPE: ICD-10-CM

## 2021-04-30 DIAGNOSIS — I83.12 VARICOSE VEINS OF BOTH LOWER EXTREMITIES WITH INFLAMMATION: ICD-10-CM

## 2021-04-30 DIAGNOSIS — I83.11 VARICOSE VEINS OF BOTH LOWER EXTREMITIES WITH INFLAMMATION: ICD-10-CM

## 2021-04-30 DIAGNOSIS — I80.9 SUPERFICIAL PHLEBITIS: Primary | ICD-10-CM

## 2021-04-30 DIAGNOSIS — Z20.822 EXPOSURE TO COVID-19 VIRUS: ICD-10-CM

## 2021-04-30 DIAGNOSIS — E66.09 CLASS 1 OBESITY DUE TO EXCESS CALORIES WITHOUT SERIOUS COMORBIDITY WITH BODY MASS INDEX (BMI) OF 30.0 TO 30.9 IN ADULT: ICD-10-CM

## 2021-04-30 PROCEDURE — 99214 OFFICE O/P EST MOD 30 MIN: CPT | Performed by: FAMILY MEDICINE

## 2021-04-30 NOTE — PROGRESS NOTES
CHOL 176 08/19/2019    CHOL 132 02/24/2015    HDL 74 10/29/2020    HDL 77 08/19/2019    HDL 70 02/24/2015    LDLCALC 67 10/29/2020    LDLCALC 85 08/19/2019    LDLCALC 50 02/24/2015    TRIG 121 10/29/2020    TRIG 71 08/19/2019    TRIG 61 02/24/2015     VITAMIN D  No results found for: VITD25  MAGNESIUM  No results found for: MG   PHOS  No results found for: PHOS   JAZMYNE   Lab Results   Component Value Date    JAZMYNE NEGATIVE 08/19/2019     RHEUMATOID FACTOR  Lab Results   Component Value Date    RF 10 08/19/2019     PSA  No results found for: PSA   HEPATITIS C  Lab Results   Component Value Date    HCVABI Non-Reactive 10/29/2020     HIV  No results found for: ASI3JEQ, HIV1QT  UA  Lab Results   Component Value Date    COLORU Yellow 10/29/2020    COLORU Yellow 02/19/2020    COLORU yellow 02/19/2020    COLORU Yellow 02/13/2020    CLARITYU Clear 10/29/2020    CLARITYU Clear 02/19/2020    CLARITYU clean 02/19/2020    CLARITYU Clear 02/13/2020    GLUCOSEU Negative 10/29/2020    GLUCOSEU Negative 02/19/2020    GLUCOSEU neg 02/19/2020    GLUCOSEU Negative 02/13/2020    BILIRUBINUR Negative 10/29/2020    BILIRUBINUR Negative 02/19/2020    BILIRUBINUR neg 02/19/2020    BILIRUBINUR Negative 02/13/2020    BILIRUBINUR neg 12/28/2019    BILIRUBINUR neg 07/19/2019    KETUA Negative 10/29/2020    KETUA Negative 02/19/2020    KETUA 15mg 02/19/2020    KETUA Negative 02/13/2020    SPECGRAV 1.020 10/29/2020    SPECGRAV <=1.005 02/19/2020    SPECGRAV 1.030 02/19/2020    SPECGRAV >=1.030 02/13/2020    BLOODU TRACE-INTACT 10/29/2020    BLOODU TRACE-INTACT 02/19/2020    BLOODU moderate 02/19/2020    BLOODU LARGE 02/13/2020    PHUR 8.0 10/29/2020    PHUR 6.5 02/19/2020    PHUR 5.5 02/19/2020    PHUR 5.5 02/13/2020    PROTEINU Negative 10/29/2020    PROTEINU Negative 02/19/2020    PROTEINU neg 02/19/2020    PROTEINU Negative 02/13/2020    UROBILINOGEN 0.2 10/29/2020    UROBILINOGEN 0.2 02/19/2020    UROBILINOGEN 0.2 02/13/2020    NITRU Negative 10/29/2020    NITRU Negative 02/19/2020    NITRU Negative 02/13/2020    LEUKOCYTESUR Negative 10/29/2020    LEUKOCYTESUR Negative 02/19/2020    LEUKOCYTESUR small 02/19/2020    LEUKOCYTESUR Negative 02/13/2020     Urine Micro/Albumin Ratio  No results found for: MALBCR            ROS:  Const: Denies chills, fever, malaise and sweats. Eyes: Denies discharge, pain, redness and visual disturbance. ENMT: Denies earaches, other ear symptoms. Denies nasal or sinus symptoms other than stated  above. Denies mouth and tongue lesions and sore throat. CV: Denies chest discomfort, pain; diaphoresis, dizziness, edema, lightheadedness, orthopnea,  palpitations, syncope and near syncopal episode or any exertional symptoms  Resp: Denies cough, hemoptysis, pleuritic pain, SOB, sputum production and wheezing. GI: Denies abdominal pain, change in bowel habits, hematochezia, melena, nausea and vomiting. : Denies urinary symptoms including dysuria , urgency, frequency or hematuria. Musculo: Denies musculoskeletal symptoms  Skin: Denies bruising and rash. Neuro: Denies headache, numbness, stiff neck, tingling and focal weakness slurred speech or facial  droop  Hema/Lymph: Denies bleeding/bruising tendency and enlarged lymph nodes        Current Outpatient Medications:     aspirin 81 MG EC tablet, Take 81 mg by mouth daily, Disp: , Rfl:   Allergies   Allergen Reactions    Morphine      Reaction is reddened skin and patient \"blacks out\"    Oxycodone Anxiety    Tomato Anaphylaxis    Alprazolam      Reaction is hallucinations  hallucinations    Hydrocodone-Acetaminophen      GI upset    Influenza Vaccines      ?  Neuro issue and neurologist said not to get again    Oseltamivir     Oxycontin [Oxycodone Hcl]      Reaction is reddened skin and patient \"blacks out\"    Tamiflu [Oseltamivir Phosphate]      Reaction is hallucinations    Adhesive Tape Rash     Reaction is sloughing of skin  Skin comes off    Oseltamivir Phosphate Nausea And Vomiting       Past Medical History:   Diagnosis Date    Blood in urine     history of    Constipation     Endometriosis     H/O superficial phlebitis 4/29/2021    Migraines     Raynaud's disease     Superficial phlebitis of leg, right 9/30/2020    Varicose veins of leg with pain, right 9/30/2020     Past Surgical History:   Procedure Laterality Date    ANKLE SURGERY      APPENDECTOMY      CHOLECYSTECTOMY      CYSTOSCOPY      HERNIA REPAIR      LAPAROSCOPY      multiple for endometriosis    TONSILLECTOMY       Family History   Problem Relation Age of Onset    Heart Disease Mother     Thyroid Disease Mother     Other Mother         kidney problems    Heart Disease Father     Allergy (Severe) Father         seasonal    Other Maternal Grandmother         brain tumor    Other Maternal Aunt         aneurysm    Cancer Paternal Cousin         ovarian      Social History     Tobacco Use    Smoking status: Never Smoker    Smokeless tobacco: Never Used   Substance Use Topics    Alcohol use: No     Frequency: Never    Drug use: No      Social History     Social History Narrative    PMH:    Medical Problems:    Endometriosis, Raynauds, Migraine    Surgical Hx:    Appendectomy, Removal of Gallbladder    Laparoscopy - Endometriosis    Hernia Repair - Hiatal    Lt ankle -- several surgeries, cartilage removal    Reviewed, no changes. FH:    Father:    Seasonal Allergies, Heart Disease. Mother:    Heart Disease. paternal cousin ovarian cancer in 42's;    maternal aunt berry aneurysm    maternal grandmother brain tumor    Reviewed, no changes. SH:    Marital: Single. Work Status: Full-Time Employment - - [employer]. Personal Habits: Cigarette Use: Nonsmoker. Alcohol: Never used alcohol.         Vitals:    04/30/21 0944   BP: 112/72   Pulse: 61   Temp: 97.1 °F (36.2 °C)   SpO2: 97%   Weight: 181 lb (82.1 kg)   Height: 5' 5\" (1.651 m)      Wt Readings from Last 3 Encounters: 04/30/21 181 lb (82.1 kg)   04/29/21 185 lb (83.9 kg)   10/27/20 185 lb (83.9 kg)          Exam:  Const: Appears comfortable. No signs of acute distress present. Head/Face: Atraumatic on inspection. Eyes: EOMI in both eyes. No discharge from the eyes. PERRL. Sclerae clear. ENMT: Auditory canals normal. Tympanic membranes: intact and translucent. External nose WNL. Nasal mucosa is clear. Oropharynx: No erythema or exudate. Posterior pharynx is normal.  Neck: Supple. Palpation reveals no adenopathy. No masses appreciated. No JVD. Carotids: no  bruits. Resp: Respirations are unlabored. Clear to auscultation. No rales, rhonchi or wheezes appreciated  over the lungs bilaterally. CV: Rate is regular. Rhythm is regular. No gallop or rubs. No heart murmur appreciated. Extremities: No clubbing, cyanosis, or edema. No calf inflammation or tenderness. Exam overall negative. Varicose veins noted but not inflamed in my opinion  Abdomen: Bowel sounds are normoactive. Abdomen is soft, nontender, and nondistended. No  abdominal masses. No palpable hepatosplenomegaly. Lymph: No palpable or visible regional lymphadenopathy. Musculoskeletal: no acute joint inflammation. Knee exam overall unremarkable at this time. Skin: Dry and warm with no rash. Skin normal to inspection and palpation overall. Neuro: Alert and oriented. Affect: appropriate. Upper Extremities: 5/5 bilaterally. Lower Extremities:  5/5 bilaterally. Sensation intact to light touch. Reflexes: DTR's are symmetric and 2+ bilaterally. .  Cranial Nerves: Cranial nerves grossly intact. Assessment and Plan:   Diagnosis Orders   1. Superficial phlebitis     2. Varicose veins of both lower extremities with inflammation     3. Exposure to COVID-19 virus     4. Insomnia, unspecified type     5.  Class 1 obesity due to excess calories without serious comorbidity with body mass index (BMI) of 30.0 to 30.9 in adult         Elevated liver enzymes  Counseled extensively. Differential reviewed. I noticed this from blood work drawn in the ER 2/20. Resolved/normalized. Hepatitis A/B/C- as of 11/2020; not immune to hepatitis A/Bdefers vaccine at this time           Superficial phlebitis  Counseled extensively. Differential reviewed, including serious etiologies. Continue per Dr. Michelle Leigh. She saw yesterday. Currently on baby aspirin. Counseled other treatment options depending on size and location. Counseled on leg elevation, compression stockings compliance, low-salt diet. Counseled on risk benefits of NSAIDs. Restrictions reviewed. Has had ultrasounds to assess for thrombosis but reflux ultrasound pending. Varicose veins of both lower extremities with inflammation  Counseled. Continue per Dr. Louisa St neoplasm  Counseled extensively. Differential reviewed, including serious etiologies. Blood  work negative in past.  Prolactin 10/20 was negative. Continues to decline repeat imaging including MRI.  Declines seeing endocrinology or neurosurgery.  No longer seeing neurology      MRI 3/16 again. Summary was     Again I recommended she consider seeing at least an endocrinologist and at least getting repeat imaging but declines now. Denies symptoms. Prolactin remains normal         Overweight (BMI 30)  Counseled. Risk reviewed. Declines formal intervention. Counseled on weight watchers, Mediterranean diet.     Allergic rhinitis  May try nasal saline, nasal steroid daily, Allegra or equivalent daily as needed.  Counseled on singular     Health maintenance examination  Health maintenance issues discussed at length  10/20. Encouraged yearly physicals. Encouraged shingrix. FIT cards are due After 11/14/2020, counseled; mammogram 10/20 -          Migraine  Counseled extensively. Differential reviewed, including serious etiologies.  She is to follow with neurology.  Used to take Topamax.  Didn't like SE's. Nose runs after migraine.  Discussed understand benefits and cost prior to any testing. This note was created with the assistance of voice recognition software. Document was reviewed however may contain grammatical errors.

## 2021-05-05 ENCOUNTER — HOSPITAL ENCOUNTER (OUTPATIENT)
Dept: ULTRASOUND IMAGING | Age: 51
Discharge: HOME | End: 2021-05-07
Payer: COMMERCIAL

## 2021-05-05 DIAGNOSIS — Z86.79 H/O SUPERFICIAL PHLEBITIS: ICD-10-CM

## 2021-05-05 DIAGNOSIS — I83.811 VARICOSE VEINS OF LEG WITH PAIN, RIGHT: ICD-10-CM

## 2021-05-05 PROCEDURE — 93971 EXTREMITY STUDY: CPT

## 2021-05-08 ENCOUNTER — OFFICE VISIT (OUTPATIENT)
Dept: FAMILY MEDICINE CLINIC | Age: 51
End: 2021-05-08
Payer: COMMERCIAL

## 2021-05-08 VITALS
TEMPERATURE: 97.7 F | BODY MASS INDEX: 29.99 KG/M2 | HEART RATE: 110 BPM | HEIGHT: 65 IN | OXYGEN SATURATION: 96 % | SYSTOLIC BLOOD PRESSURE: 122 MMHG | DIASTOLIC BLOOD PRESSURE: 84 MMHG | WEIGHT: 180 LBS

## 2021-05-08 DIAGNOSIS — J30.1 SEASONAL ALLERGIC RHINITIS DUE TO POLLEN: Primary | ICD-10-CM

## 2021-05-08 DIAGNOSIS — R05.9 COUGH: ICD-10-CM

## 2021-05-08 DIAGNOSIS — R09.81 SINUS CONGESTION: ICD-10-CM

## 2021-05-08 LAB
Lab: NORMAL
PERFORMING INSTRUMENT: NORMAL
QC PASS/FAIL: NORMAL
SARS-COV-2, POC: NORMAL

## 2021-05-08 PROCEDURE — 96372 THER/PROPH/DIAG INJ SC/IM: CPT | Performed by: FAMILY MEDICINE

## 2021-05-08 PROCEDURE — 87426 SARSCOV CORONAVIRUS AG IA: CPT | Performed by: FAMILY MEDICINE

## 2021-05-08 PROCEDURE — 99213 OFFICE O/P EST LOW 20 MIN: CPT | Performed by: FAMILY MEDICINE

## 2021-05-08 RX ORDER — PREDNISONE 10 MG/1
TABLET ORAL
Qty: 11 TABLET | Refills: 0 | Status: SHIPPED
Start: 2021-05-08 | End: 2021-05-14

## 2021-05-08 RX ORDER — METHYLPREDNISOLONE ACETATE 80 MG/ML
80 INJECTION, SUSPENSION INTRA-ARTICULAR; INTRALESIONAL; INTRAMUSCULAR; SOFT TISSUE ONCE
Status: COMPLETED | OUTPATIENT
Start: 2021-05-08 | End: 2021-05-08

## 2021-05-08 RX ADMIN — METHYLPREDNISOLONE ACETATE 80 MG: 80 INJECTION, SUSPENSION INTRA-ARTICULAR; INTRALESIONAL; INTRAMUSCULAR; SOFT TISSUE at 08:38

## 2021-05-08 ASSESSMENT — ENCOUNTER SYMPTOMS
CHEST TIGHTNESS: 0
CONSTIPATION: 0
VOMITING: 1
TROUBLE SWALLOWING: 0
DIARRHEA: 0
EYE PAIN: 0
NAUSEA: 0
SORE THROAT: 1
BACK PAIN: 0
ABDOMINAL PAIN: 0
SINUS PAIN: 0
SHORTNESS OF BREATH: 1
WHEEZING: 0
RHINORRHEA: 1
COUGH: 1

## 2021-05-08 NOTE — PROGRESS NOTES
Jaclyn Salinas (:  1970) is a 45 y.o. female,Established patient, here for evaluation of the following chief complaint(s):  Cough (present since Wednesday) and Nausea      ASSESSMENT/PLAN:  1. Seasonal allergic rhinitis due to pollen  Comments:  prednisone, continue claritin d 12hr and add nasacort  f/u in 4 to 5 days as needed  2. Sinus congestion  Comments:  hot tea with honey, stay hydrated, rest  covid test negative  3. Cough      Return if symptoms worsen or fail to improve. SUBJECTIVE/OBJECTIVE:  Patient here with head congestion anddrainage that stated after she bought her mom flowers Wednesday  She was taking claritin D 12hr with no relief of her s/s    Runny nose, post nasal drip, a lot of mucous  Sneezing, cough   Now nasal congestion  Says she had a fever last evening 100, but no other temp prior and resolved this am  She feels very tired    In no distress does not appear short of breath, oxygen level good  completeing full sentences          Review of Systems   Constitutional: Positive for fatigue and fever. Negative for appetite change. Fever last evening 100 but no other temps   HENT: Positive for congestion, postnasal drip, rhinorrhea, sneezing and sore throat. Negative for ear pain, sinus pain and trouble swallowing. Eyes: Negative for pain. Respiratory: Positive for cough and shortness of breath. Negative for chest tightness and wheezing. Cardiovascular: Negative for chest pain, palpitations and leg swelling. Gastrointestinal: Positive for vomiting. Negative for abdominal pain, constipation, diarrhea and nausea. Vomited mucous up last evening   Endocrine: Negative for cold intolerance and heat intolerance. Genitourinary: Negative for difficulty urinating, hematuria and pelvic pain. Musculoskeletal: Negative for back pain, gait problem and joint swelling. Skin: Negative for rash and wound. Neurological: Negative for dizziness, syncope and headaches. Hematological: Negative for adenopathy. Psychiatric/Behavioral: Negative for confusion, sleep disturbance and suicidal ideas. Physical Exam  Vitals signs and nursing note reviewed. Constitutional:       General: She is not in acute distress. Appearance: Normal appearance. She is well-developed. She is not ill-appearing. HENT:      Head: Normocephalic and atraumatic. Ears:      Comments: TM bulging and slighlty injected     Nose: Congestion present. Mouth/Throat:      Mouth: Mucous membranes are moist.      Pharynx: No oropharyngeal exudate or posterior oropharyngeal erythema. Eyes:      Pupils: Pupils are equal, round, and reactive to light. Neck:      Musculoskeletal: Normal range of motion. Cardiovascular:      Rate and Rhythm: Normal rate and regular rhythm. Pulmonary:      Effort: Pulmonary effort is normal. No respiratory distress. Breath sounds: Normal breath sounds. No wheezing. Abdominal:      General: Bowel sounds are normal.      Palpations: Abdomen is soft. Skin:     General: Skin is warm and dry. Neurological:      General: No focal deficit present. Mental Status: She is alert and oriented to person, place, and time. Psychiatric:         Mood and Affect: Mood normal.         Thought Content: Thought content normal.                 An electronic signature was used to authenticate this note.     --Vivian Coleman DO

## 2021-05-10 ENCOUNTER — TELEPHONE (OUTPATIENT)
Dept: FAMILY MEDICINE CLINIC | Age: 51
End: 2021-05-10

## 2021-05-10 ENCOUNTER — OFFICE VISIT (OUTPATIENT)
Dept: FAMILY MEDICINE CLINIC | Age: 51
End: 2021-05-10
Payer: COMMERCIAL

## 2021-05-10 ENCOUNTER — TELEPHONE (OUTPATIENT)
Dept: PRIMARY CARE CLINIC | Age: 51
End: 2021-05-10

## 2021-05-10 VITALS
SYSTOLIC BLOOD PRESSURE: 116 MMHG | TEMPERATURE: 97.9 F | DIASTOLIC BLOOD PRESSURE: 72 MMHG | BODY MASS INDEX: 29.82 KG/M2 | OXYGEN SATURATION: 98 % | RESPIRATION RATE: 18 BRPM | HEIGHT: 65 IN | HEART RATE: 74 BPM | WEIGHT: 179 LBS

## 2021-05-10 DIAGNOSIS — R05.9 COUGH: Primary | ICD-10-CM

## 2021-05-10 DIAGNOSIS — M79.10 MYALGIA: ICD-10-CM

## 2021-05-10 DIAGNOSIS — J06.9 ACUTE UPPER RESPIRATORY INFECTION, UNSPECIFIED: ICD-10-CM

## 2021-05-10 DIAGNOSIS — R50.9 FEVER, UNSPECIFIED FEVER CAUSE: ICD-10-CM

## 2021-05-10 DIAGNOSIS — R09.81 NASAL CONGESTION: ICD-10-CM

## 2021-05-10 LAB
INFLUENZA A ANTIBODY: NEGATIVE
INFLUENZA B ANTIBODY: NEGATIVE
Lab: NORMAL
PERFORMING INSTRUMENT: NORMAL
QC PASS/FAIL: NORMAL
SARS-COV-2, POC: NORMAL

## 2021-05-10 PROCEDURE — 99214 OFFICE O/P EST MOD 30 MIN: CPT | Performed by: PHYSICIAN ASSISTANT

## 2021-05-10 PROCEDURE — 87804 INFLUENZA ASSAY W/OPTIC: CPT | Performed by: PHYSICIAN ASSISTANT

## 2021-05-10 PROCEDURE — 3006F CXR DOC REV: CPT | Performed by: PHYSICIAN ASSISTANT

## 2021-05-10 PROCEDURE — 87426 SARSCOV CORONAVIRUS AG IA: CPT | Performed by: PHYSICIAN ASSISTANT

## 2021-05-10 RX ORDER — CHLORPHENIRAMINE MALEATE 4 MG/1
4 TABLET ORAL EVERY 6 HOURS PRN
Qty: 20 TABLET | Refills: 0 | Status: SHIPPED
Start: 2021-05-10 | End: 2021-10-21

## 2021-05-10 RX ORDER — DOXYCYCLINE HYCLATE 100 MG
100 TABLET ORAL 2 TIMES DAILY
Qty: 20 TABLET | Refills: 0 | Status: SHIPPED | OUTPATIENT
Start: 2021-05-10 | End: 2021-05-20

## 2021-05-10 RX ORDER — BENZONATATE 100 MG/1
100 CAPSULE ORAL 3 TIMES DAILY PRN
Qty: 21 CAPSULE | Refills: 0 | Status: SHIPPED | OUTPATIENT
Start: 2021-05-10 | End: 2021-05-17

## 2021-05-10 NOTE — TELEPHONE ENCOUNTER
Spoke with the patient and she will use the medications we discussed and not use any other decongestant

## 2021-05-10 NOTE — PROGRESS NOTES
Disease Mother     Thyroid Disease Mother     Other Mother         kidney problems    Heart Disease Father     Allergy (Severe) Father         seasonal    Other Maternal Grandmother         brain tumor    Other Maternal Aunt         aneurysm    Cancer Paternal Cousin         ovarian        Medications:     Current Outpatient Medications:     doxycycline hyclate (VIBRA-TABS) 100 MG tablet, Take 1 tablet by mouth 2 times daily for 10 days, Disp: 20 tablet, Rfl: 0    benzonatate (TESSALON) 100 MG capsule, Take 1 capsule by mouth 3 times daily as needed for Cough, Disp: 21 capsule, Rfl: 0    chlorpheniramine (ALLER-CHLOR) 4 MG tablet, Take 1 tablet by mouth every 6 hours as needed for Allergies, Disp: 20 tablet, Rfl: 0    predniSONE (DELTASONE) 10 MG tablet, 1 po this evening then bid x 5 days starting tomorrow, Disp: 11 tablet, Rfl: 0    aspirin 81 MG EC tablet, Take 81 mg by mouth daily, Disp: , Rfl:     Allergies: Allergies   Allergen Reactions    Morphine      Reaction is reddened skin and patient \"blacks out\"    Oxycodone Anxiety    Tomato Anaphylaxis    Alprazolam      Reaction is hallucinations  hallucinations    Hydrocodone-Acetaminophen      GI upset    Influenza Vaccines      ? Neuro issue and neurologist said not to get again    Oseltamivir     Oxycontin [Oxycodone Hcl]      Reaction is reddened skin and patient \"blacks out\"    Tamiflu [Oseltamivir Phosphate]      Reaction is hallucinations    Adhesive Tape Rash     Reaction is sloughing of skin  Skin comes off    Oseltamivir Phosphate Nausea And Vomiting       Social History:     Social History     Tobacco Use    Smoking status: Never Smoker    Smokeless tobacco: Never Used   Substance Use Topics    Alcohol use: No     Frequency: Never    Drug use: No       Patient lives at home.     Physical Exam:     Vitals:    05/10/21 0920   BP: 116/72   Pulse: 74   Resp: 18   Temp: 97.9 °F (36.6 °C)   SpO2: 98%   Weight: 179 lb (81.2 kg) Height: 5' 5\" (1.651 m)       Exam:  Physical Exam  Nurse's notes and vital signs reviewed. The patient is not hypoxic. ? General: Alert, no acute distress, patient resting comfortably Patient is not toxic or lethargic. Skin: Warm, intact, no pallor noted. There is no evidence of rash at this time. Head: Normocephalic, atraumatic  Eye: Normal conjunctiva  Ears, Nose, Throat: Right tympanic membrane clear, left tympanic membrane clear. No drainage or discharge noted. No pre- or post-auricular tenderness, erythema, or swelling noted. Mild nasal congestion, rhinorrhea  Posterior oropharynx shows no erythema, tonsillar hypertrophy, or exudate. the uvula is midline. No trismus or drooling is noted. Moist mucous membranes. Neck: No anterior/posterior lymphadenopathy noted. No erythema, no masses, no fluctuance or induration noted. No meningeal signs. Cardiovascular: Regular Rate and Rhythm  Respiratory: No acute distress, harsh cough noted but no rhonchi, wheezing or crackles noted. No stridor or retractions are noted. Neurological: A&O x4, normal speech  Psychiatric: Cooperative         Testing:     Results for orders placed or performed in visit on 05/10/21   POCT Influenza A/B   Result Value Ref Range    Influenza A Ab negative     Influenza B Ab negative    POCT COVID-19, Antigen   Result Value Ref Range    SARS-COV-2, POC Not-Detected Not Detected    Lot Number [id number]     QC Pass/Fail pass     Performing Instrument BD Veritor      Xr Chest Standard (2 Vw)    Result Date: 5/10/2021  EXAMINATION: TWO XRAY VIEWS OF THE CHEST 5/10/2021 8:42 am COMPARISON: May 3, 2014 HISTORY: ORDERING SYSTEM PROVIDED HISTORY: Cough TECHNOLOGIST PROVIDED HISTORY: Reason for exam:->cough FINDINGS: The lungs are without acute focal process. There is no effusion or pneumothorax. The cardiomediastinal silhouette is without acute process. The osseous structures are without acute process. No acute process.        Medical Decision Making:     Vital signs reviewed    Past medical history reviewed. Allergies reviewed. Medications reviewed. Patient on arrival does not appear to be in any apparent distress or discomfort. The patient has been seen and evaluated. The patient does not appear to be toxic or lethargic. The patient had rapid  influenza and Covid test performed. The patient was sent for chest x-ray. I personally reviewed the x-ray images and did not see any evidence of acute cardiopulmonary process. The patient will be updated with the formal radiology report. The patient will be treated with Tessalon Perles, chlorphentermine, and doxycycline. The patient was educated on the proper dosage of motrin and tylenol and the appropriate intervals of each. The patient is to increase fluid intake over the next several days. The patient is to use OTC decongestant as needed. The patient is to return to express care or go directly to the emergency department should any of the signs or symptoms worsen. The patient is to followup with primary care physician in 2-3 days for repeat evaluation. The patient has no other questions or concerns at this time the patient will be discharged home. Clinical Impression:   Jessica Omer was seen today for cough. Diagnoses and all orders for this visit:    Cough  -     POCT Influenza A/B  -     POCT COVID-19, Antigen  -     XR CHEST STANDARD (2 VW); Future  -     Covid-19 Ambulatory; Future    Acute upper respiratory infection, unspecified    Fever, unspecified fever cause    Myalgia    Nasal congestion    Other orders  -     doxycycline hyclate (VIBRA-TABS) 100 MG tablet; Take 1 tablet by mouth 2 times daily for 10 days  -     benzonatate (TESSALON) 100 MG capsule; Take 1 capsule by mouth 3 times daily as needed for Cough  -     chlorpheniramine (ALLER-CHLOR) 4 MG tablet;  Take 1 tablet by mouth every 6 hours as needed for Allergies        The patient is to call for any concerns or

## 2021-05-10 NOTE — TELEPHONE ENCOUNTER
Sat. was seen in [facility]- has a terrible cough, fever, migraine, vomiting, does not think any better. She can not do a video visit. Covid was neg on sat.  What should she do?  [phone]  ( will need a work slip)

## 2021-05-10 NOTE — TELEPHONE ENCOUNTER
The pt was here through express to see you because she has a cough. She is calling because she can't remember if you told her to continue or to stop taking the allergy medicine.  She says she is so sorry she just doesn't feel good and doesn't remember

## 2021-05-11 DIAGNOSIS — R05.9 COUGH: ICD-10-CM

## 2021-05-12 LAB
SARS-COV-2: NOT DETECTED
SOURCE: NORMAL

## 2021-05-13 ENCOUNTER — TELEPHONE (OUTPATIENT)
Dept: PRIMARY CARE CLINIC | Age: 51
End: 2021-05-13

## 2021-05-13 NOTE — TELEPHONE ENCOUNTER
Yes,  best to be reassessed if continued or worsening symptoms. Looks like she was prescribed prednisone May 8, doxycycline May 10. Had chest x-ray. Given the persisting/worsening symptoms as well as history of recent fever it is advised she come back through express care for assessment, possible repeat testing.   As always ER if any serious signs or symptoms

## 2021-05-13 NOTE — TELEPHONE ENCOUNTER
The pt has come through express twice in the last week because she has a cough, congestion, and drainage. She was tested for covid both times and tested for the flu once, and all the tests came back negative. She was given   predniSONE (DELTASONE) 10 MG tablet  1 po this evening then bid x 5 days starting tomorrow          doxycycline hyclate (VIBRA-TABS) 100 MG tablet; Take 1 tablet by mouth 2 times daily for 10 days  -     benzonatate (TESSALON) 100 MG capsule; Take 1 capsule by mouth 3 times daily as needed for Cough  -     chlorpheniramine (ALLER-CHLOR) 4 MG tablet;  Take 1 tablet by mouth every 6 hours as needed for Allergies    She is calling because she still doesn't feel well and is not sure if she should come back in or what she should do

## 2021-05-14 ENCOUNTER — OFFICE VISIT (OUTPATIENT)
Dept: FAMILY MEDICINE CLINIC | Age: 51
End: 2021-05-14
Payer: COMMERCIAL

## 2021-05-14 VITALS
TEMPERATURE: 96.4 F | SYSTOLIC BLOOD PRESSURE: 118 MMHG | WEIGHT: 181 LBS | HEART RATE: 84 BPM | OXYGEN SATURATION: 97 % | DIASTOLIC BLOOD PRESSURE: 66 MMHG | BODY MASS INDEX: 30.12 KG/M2

## 2021-05-14 DIAGNOSIS — R05.9 COUGH: ICD-10-CM

## 2021-05-14 DIAGNOSIS — J06.9 ACUTE UPPER RESPIRATORY INFECTION, UNSPECIFIED: Primary | ICD-10-CM

## 2021-05-14 PROCEDURE — 99213 OFFICE O/P EST LOW 20 MIN: CPT | Performed by: PHYSICIAN ASSISTANT

## 2021-05-14 RX ORDER — DEXTROMETHORPHAN HYDROBROMIDE AND PROMETHAZINE HYDROCHLORIDE 15; 6.25 MG/5ML; MG/5ML
5 SYRUP ORAL 4 TIMES DAILY PRN
Qty: 120 ML | Refills: 0 | Status: SHIPPED | OUTPATIENT
Start: 2021-05-14 | End: 2021-05-21

## 2021-05-14 RX ORDER — ALBUTEROL SULFATE 90 UG/1
2 AEROSOL, METERED RESPIRATORY (INHALATION) 4 TIMES DAILY PRN
Qty: 1 INHALER | Refills: 0 | Status: SHIPPED
Start: 2021-05-14 | End: 2022-02-01 | Stop reason: ALTCHOICE

## 2021-05-14 NOTE — PROGRESS NOTES
21  Katerin Metz : 1970 Sex: female  Age 45 y.o. Subjective:  Chief Complaint   Patient presents with    Cough    Drainage    Pharyngitis         HPI:   Katerin Metz , 45 y.o. female presents to [facility] care for evaluation of cough, congestion, drainage, sore throat. The patient has had these symtpoms for the last couple of weeks. Was on doxycycline, prednisone, tessalon and no improvement. Fu was negative. Covid was negative. The patient was seen in [place] on a  and was given steroids. The patient was then seen and evaluated in the office by myself and we did chest x-ray and repeat testing in both of those were negative. Patient is actually feeling a little bit better now. She states couple hours ago she was significantly coughing and congested. The patient is not having any fevers or chills. The patient does note some fatigue. She does have to return to work on Monday and was just concerned that she has not made much improvement here in the last week. ROS:   Unless otherwise stated in this report the patient's positive and negative responses for review of systems for constitutional, eyes, ENT, cardiovascular, respiratory, gastrointestinal, neurological, , musculoskeletal, and integument systems and related systems to the presenting problem are either stated in the history of present illness or were not pertinent or were negative for the symptoms and/or complaints related to the presenting medical problem. Positives and pertinent negatives as per HPI. All others reviewed and are negative.       PMH:     Past Medical History:   Diagnosis Date    Blood in urine     history of    Constipation     Endometriosis     H/O superficial phlebitis 2021    Migraines     Raynaud's disease     Superficial phlebitis of leg, right 2020    Varicose veins of leg with pain, right 2020       Past Surgical History:   Procedure Laterality Date    ANKLE SURGERY      APPENDECTOMY      CHOLECYSTECTOMY      CYSTOSCOPY      HERNIA REPAIR      LAPAROSCOPY      multiple for endometriosis    TONSILLECTOMY         Family History   Problem Relation Age of Onset    Heart Disease Mother     Thyroid Disease Mother     Other Mother         kidney problems    Heart Disease Father     Allergy (Severe) Father         seasonal    Other Maternal Grandmother         brain tumor    Other Maternal Aunt         aneurysm    Cancer Paternal Cousin         ovarian        Medications:     Current Outpatient Medications:     doxycycline hyclate (VIBRA-TABS) 100 MG tablet, Take 1 tablet by mouth 2 times daily for 10 days, Disp: 20 tablet, Rfl: 0    benzonatate (TESSALON) 100 MG capsule, Take 1 capsule by mouth 3 times daily as needed for Cough, Disp: 21 capsule, Rfl: 0    chlorpheniramine (ALLER-CHLOR) 4 MG tablet, Take 1 tablet by mouth every 6 hours as needed for Allergies, Disp: 20 tablet, Rfl: 0    aspirin 81 MG EC tablet, Take 81 mg by mouth daily, Disp: , Rfl:     Allergies: Allergies   Allergen Reactions    Morphine      Reaction is reddened skin and patient \"blacks out\"    Oxycodone Anxiety    Tomato Anaphylaxis    Alprazolam      Reaction is hallucinations  hallucinations    Hydrocodone-Acetaminophen      GI upset    Influenza Vaccines      ? Neuro issue and neurologist said not to get again    Oseltamivir     Oxycontin [Oxycodone Hcl]      Reaction is reddened skin and patient \"blacks out\"    Tamiflu [Oseltamivir Phosphate]      Reaction is hallucinations    Adhesive Tape Rash     Reaction is sloughing of skin  Skin comes off    Oseltamivir Phosphate Nausea And Vomiting       Social History:     Social History     Tobacco Use    Smoking status: Never Smoker    Smokeless tobacco: Never Used   Substance Use Topics    Alcohol use: No     Frequency: Never    Drug use: No       Patient lives at home.     Physical Exam:     Vitals:    05/14/21 1334 BP: 118/66   Pulse: 84   Temp: 96.4 °F (35.8 °C)   SpO2: 97%   Weight: 181 lb (82.1 kg)       Exam:  Physical Exam  Nurse's notes and vital signs reviewed. The patient is not hypoxic. ? General: Alert, no acute distress, patient resting comfortably Patient is not toxic or lethargic. Skin: Warm, intact, no pallor noted. There is no evidence of rash at this time. Head: Normocephalic, atraumatic  Eye: Normal conjunctiva  Ears, Nose, Throat: Right tympanic membrane clear, left tympanic membrane clear. No drainage or discharge noted. No pre- or post-auricular tenderness, erythema, or swelling noted. Nasal congestion  Posterior oropharynx shows erythema but no evidence of tonsillar hypertrophy, or exudate. the uvula is midline. No trismus or drooling is noted. Moist mucous membranes. Neck: No anterior/posterior lymphadenopathy noted. No erythema, no masses, no fluctuance or induration noted. No meningeal signs. Cardiovascular: Regular Rate and Rhythm  Respiratory: No acute distress, significant coughing episodes but no adventitious lung sounds, no rhonchi, wheezing or crackles noted. No stridor or retractions are noted. Neurological: A&O x4, normal speech  Psychiatric: Cooperative         Testing:     No results found for this visit on 05/14/21. Xr Chest Standard (2 Vw)    Result Date: 5/10/2021  EXAMINATION: TWO XRAY VIEWS OF THE CHEST 5/10/2021 8:42 am COMPARISON: May 3, 2014 HISTORY: ORDERING SYSTEM PROVIDED HISTORY: Cough TECHNOLOGIST PROVIDED HISTORY: Reason for exam:->cough FINDINGS: The lungs are without acute focal process. There is no effusion or pneumothorax. The cardiomediastinal silhouette is without acute process. The osseous structures are without acute process. No acute process. Medical Decision Making:     Vital signs reviewed    Past medical history reviewed. Allergies reviewed. Medications reviewed.     Patient on arrival does not appear to be in any apparent distress or discomfort. The patient has been seen and evaluated. The patient does not appear to be toxic or lethargic. I did offer to send the patient for repeat x-ray but her lungs sound clear to auscultation. The patient's vital signs are noted to be within normal limits. The patient does not appear to be toxic or lethargic. The patient will be treated with albuterol. The patient will be given Promethazine DM and albuterol inhaler. This may be related to some bronchospasm. We will have the patient use the inhaler 2 puffs every 6 hours around-the-clock for the next 2 days. Use the cough medication as needed. The patient will monitor signs and symptoms. She will update us how she feels over the weekend. The patient is to return to express care or go directly to the emergency department should any of the signs or symptoms worsen. The patient is to followup with primary care physician in 2-3 days for repeat evaluation. The patient has no other questions or concerns at this time the patient will be discharged home. Clinical Impression:   Anderson Rocha was seen today for cough, drainage and pharyngitis. Diagnoses and all orders for this visit:    Acute upper respiratory infection, unspecified    Cough    Other orders  -     albuterol sulfate  (90 Base) MCG/ACT inhaler; Inhale 2 puffs into the lungs 4 times daily as needed for Wheezing  -     promethazine-dextromethorphan (PROMETHAZINE-DM) 6.25-15 MG/5ML syrup; Take 5 mLs by mouth 4 times daily as needed for Cough        The patient is to call for any concerns or return if any of the signs or symptoms worsen. The patient is to follow-up with PCP in the next 2-3 days for repeat evaluation repeat assessment or go directly to the emergency department.      SIGNATURE: Salud Hampton III, PA-C

## 2021-07-12 ENCOUNTER — TELEPHONE (OUTPATIENT)
Dept: VASCULAR SURGERY | Age: 51
End: 2021-07-12

## 2021-07-12 NOTE — TELEPHONE ENCOUNTER
No answer again    The venous ultrasound study of the right leg reviewed, no DVT, does have significant reflux, at the saphenofemoral junction, almost 5 seconds, median distally, 3.5 seconds    Message left with patient to call office to discuss test results further

## 2021-07-16 ENCOUNTER — TELEPHONE (OUTPATIENT)
Dept: VASCULAR SURGERY | Age: 51
End: 2021-07-16

## 2021-07-19 ENCOUNTER — TELEPHONE (OUTPATIENT)
Dept: VASCULAR SURGERY | Age: 51
End: 2021-07-19

## 2021-07-20 ENCOUNTER — TELEPHONE (OUTPATIENT)
Dept: VASCULAR SURGERY | Age: 51
End: 2021-07-20

## 2021-07-20 NOTE — TELEPHONE ENCOUNTER
Discussed with the patient regarding results, no DVT, or significant reflux at the saphenofemoral junction, for now follow conservatively with compression stockings, patient tells me that she has tried stocking the past, still has increasing symptoms, would prefer to have intervention for the symptomatic varicose veins, including radiofrequency ablation of the greater saphenous vein and stab phlebectomy of the symptomatic varicose veins, however, she is in the process of switching jobs, will think about the options, and will call me back if she has any increased symptoms and when she is ready to consider intervention, all her questions were answered

## 2021-09-26 ENCOUNTER — HOSPITAL ENCOUNTER (EMERGENCY)
Age: 51
Discharge: HOME | End: 2021-09-26
Attending: EMERGENCY MEDICINE
Payer: COMMERCIAL

## 2021-09-26 ENCOUNTER — APPOINTMENT (OUTPATIENT)
Dept: CT IMAGING | Age: 51
End: 2021-09-26
Payer: COMMERCIAL

## 2021-09-26 VITALS
OXYGEN SATURATION: 98 % | RESPIRATION RATE: 18 BRPM | BODY MASS INDEX: 30.82 KG/M2 | TEMPERATURE: 98.6 F | WEIGHT: 185 LBS | HEIGHT: 65 IN | HEART RATE: 80 BPM | DIASTOLIC BLOOD PRESSURE: 56 MMHG | SYSTOLIC BLOOD PRESSURE: 119 MMHG

## 2021-09-26 DIAGNOSIS — G43.809 OTHER MIGRAINE WITHOUT STATUS MIGRAINOSUS, NOT INTRACTABLE: Primary | ICD-10-CM

## 2021-09-26 LAB
ANION GAP SERPL CALCULATED.3IONS-SCNC: 9 MMOL/L (ref 7–16)
BASOPHILS ABSOLUTE: 0.07 E9/L (ref 0–0.2)
BASOPHILS RELATIVE PERCENT: 0.9 % (ref 0–2)
BUN BLDV-MCNC: 17 MG/DL (ref 6–20)
CALCIUM SERPL-MCNC: 9.4 MG/DL (ref 8.6–10.2)
CHLORIDE BLD-SCNC: 106 MMOL/L (ref 98–107)
CO2: 29 MMOL/L (ref 22–29)
CREAT SERPL-MCNC: 0.6 MG/DL (ref 0.5–1)
EOSINOPHILS ABSOLUTE: 0.26 E9/L (ref 0.05–0.5)
EOSINOPHILS RELATIVE PERCENT: 3.5 % (ref 0–6)
GFR AFRICAN AMERICAN: >60
GFR NON-AFRICAN AMERICAN: >60 ML/MIN/1.73
GLUCOSE BLD-MCNC: 94 MG/DL (ref 74–99)
HCG, URINE, POC: NEGATIVE
HCT VFR BLD CALC: 43.8 % (ref 34–48)
HEMOGLOBIN: 14 G/DL (ref 11.5–15.5)
IMMATURE GRANULOCYTES #: 0.01 E9/L
IMMATURE GRANULOCYTES %: 0.1 % (ref 0–5)
LYMPHOCYTES ABSOLUTE: 2.67 E9/L (ref 1.5–4)
LYMPHOCYTES RELATIVE PERCENT: 35.7 % (ref 20–42)
Lab: NORMAL
MAGNESIUM: 2.2 MG/DL (ref 1.6–2.6)
MCH RBC QN AUTO: 29 PG (ref 26–35)
MCHC RBC AUTO-ENTMCNC: 32 % (ref 32–34.5)
MCV RBC AUTO: 90.7 FL (ref 80–99.9)
MONOCYTES ABSOLUTE: 0.55 E9/L (ref 0.1–0.95)
MONOCYTES RELATIVE PERCENT: 7.4 % (ref 2–12)
NEGATIVE QC PASS/FAIL: NORMAL
NEUTROPHILS ABSOLUTE: 3.91 E9/L (ref 1.8–7.3)
NEUTROPHILS RELATIVE PERCENT: 52.4 % (ref 43–80)
PDW BLD-RTO: 12.9 FL (ref 11.5–15)
PLATELET # BLD: 228 E9/L (ref 130–450)
PMV BLD AUTO: 9.4 FL (ref 7–12)
POSITIVE QC PASS/FAIL: NORMAL
POTASSIUM REFLEX MAGNESIUM: 3.2 MMOL/L (ref 3.5–5)
RBC # BLD: 4.83 E12/L (ref 3.5–5.5)
SODIUM BLD-SCNC: 144 MMOL/L (ref 132–146)
WBC # BLD: 7.5 E9/L (ref 4.5–11.5)

## 2021-09-26 PROCEDURE — 2580000003 HC RX 258

## 2021-09-26 PROCEDURE — 6370000000 HC RX 637 (ALT 250 FOR IP)

## 2021-09-26 PROCEDURE — 70496 CT ANGIOGRAPHY HEAD: CPT

## 2021-09-26 PROCEDURE — 6360000002 HC RX W HCPCS

## 2021-09-26 PROCEDURE — 6360000004 HC RX CONTRAST MEDICATION: Performed by: RADIOLOGY

## 2021-09-26 PROCEDURE — 83735 ASSAY OF MAGNESIUM: CPT

## 2021-09-26 PROCEDURE — 96374 THER/PROPH/DIAG INJ IV PUSH: CPT

## 2021-09-26 PROCEDURE — 85025 COMPLETE CBC W/AUTO DIFF WBC: CPT

## 2021-09-26 PROCEDURE — 99284 EMERGENCY DEPT VISIT MOD MDM: CPT

## 2021-09-26 PROCEDURE — 80048 BASIC METABOLIC PNL TOTAL CA: CPT

## 2021-09-26 PROCEDURE — 96375 TX/PRO/DX INJ NEW DRUG ADDON: CPT

## 2021-09-26 RX ORDER — DIPHENHYDRAMINE HYDROCHLORIDE 50 MG/ML
25 INJECTION INTRAMUSCULAR; INTRAVENOUS ONCE
Status: COMPLETED | OUTPATIENT
Start: 2021-09-26 | End: 2021-09-26

## 2021-09-26 RX ORDER — SUMATRIPTAN 25 MG/1
25 TABLET, FILM COATED ORAL DAILY PRN
Qty: 15 TABLET | Refills: 0 | Status: SHIPPED | OUTPATIENT
Start: 2021-09-26 | End: 2021-10-08

## 2021-09-26 RX ORDER — ACETAMINOPHEN 500 MG
500 TABLET ORAL ONCE
Status: COMPLETED | OUTPATIENT
Start: 2021-09-26 | End: 2021-09-26

## 2021-09-26 RX ORDER — SODIUM CHLORIDE 9 MG/ML
INJECTION, SOLUTION INTRAVENOUS ONCE
Status: COMPLETED | OUTPATIENT
Start: 2021-09-26 | End: 2021-09-26

## 2021-09-26 RX ORDER — METOCLOPRAMIDE HYDROCHLORIDE 5 MG/ML
10 INJECTION INTRAMUSCULAR; INTRAVENOUS ONCE
Status: COMPLETED | OUTPATIENT
Start: 2021-09-26 | End: 2021-09-26

## 2021-09-26 RX ORDER — PREDNISONE 10 MG/1
10 TABLET ORAL 2 TIMES DAILY
Qty: 8 TABLET | Refills: 0 | Status: SHIPPED | OUTPATIENT
Start: 2021-09-26 | End: 2021-09-30

## 2021-09-26 RX ORDER — ONDANSETRON 4 MG/1
4 TABLET, ORALLY DISINTEGRATING ORAL 3 TIMES DAILY PRN
Qty: 21 TABLET | Refills: 0 | Status: SHIPPED | OUTPATIENT
Start: 2021-09-26 | End: 2021-10-21

## 2021-09-26 RX ORDER — POTASSIUM CHLORIDE 1.5 G/1.77G
20 POWDER, FOR SOLUTION ORAL ONCE
Status: DISCONTINUED | OUTPATIENT
Start: 2021-09-26 | End: 2021-09-26 | Stop reason: SDUPTHER

## 2021-09-26 RX ORDER — KETOROLAC TROMETHAMINE 30 MG/ML
15 INJECTION, SOLUTION INTRAMUSCULAR; INTRAVENOUS ONCE
Status: DISCONTINUED | OUTPATIENT
Start: 2021-09-26 | End: 2021-09-26

## 2021-09-26 RX ADMIN — SODIUM CHLORIDE: 9 INJECTION, SOLUTION INTRAVENOUS at 09:07

## 2021-09-26 RX ADMIN — IOPAMIDOL 75 ML: 755 INJECTION, SOLUTION INTRAVENOUS at 09:50

## 2021-09-26 RX ADMIN — DIPHENHYDRAMINE HYDROCHLORIDE 25 MG: 50 INJECTION INTRAMUSCULAR; INTRAVENOUS at 09:08

## 2021-09-26 RX ADMIN — POTASSIUM BICARBONATE 20 MEQ: 782 TABLET, EFFERVESCENT ORAL at 10:25

## 2021-09-26 RX ADMIN — METOCLOPRAMIDE HYDROCHLORIDE 10 MG: 5 INJECTION INTRAMUSCULAR; INTRAVENOUS at 09:07

## 2021-09-26 RX ADMIN — ACETAMINOPHEN 500 MG: 500 TABLET ORAL at 09:08

## 2021-09-26 ASSESSMENT — ENCOUNTER SYMPTOMS
SINUS PRESSURE: 0
SHORTNESS OF BREATH: 0
EYE PAIN: 0
VOMITING: 0
EYE DISCHARGE: 0
DIARRHEA: 0
NAUSEA: 0
PHOTOPHOBIA: 1
CHEST TIGHTNESS: 0
RHINORRHEA: 0
CONSTIPATION: 0
ABDOMINAL DISTENTION: 0

## 2021-09-26 ASSESSMENT — PAIN SCALES - GENERAL
PAINLEVEL_OUTOF10: 5
PAINLEVEL_OUTOF10: 1

## 2021-09-26 NOTE — ED PROVIDER NOTES
Jyotsna Sun is a 65-year-old female presenting today for evaluations of a severe headache. Patient states she had 2 episodes throughout the night, both with sudden onset 6 out of 10 sharp throbbing pain non-radiating. Patient states that the pain was also associated with increased palpitations. Patient states she has a history of migraines however this is nothing like that. These 2 episodes were not alleviated using ice or peppermint, aggravated by light or sound. Patient states she did not take any medications because she knew she was coming to the hospital.  Patient states that she has not followed with a neurologist in [place] since the pandemic and has not got her medications refilled. Patient also states she has a history of a DVT in her right leg managed with aspirin 81 mg daily. Review of Systems   Constitutional: Negative for activity change, chills, fatigue and fever. HENT: Negative for rhinorrhea and sinus pressure. Eyes: Positive for photophobia. Negative for pain, discharge and visual disturbance. Respiratory: Negative for chest tightness and shortness of breath. Cardiovascular: Positive for palpitations. Negative for chest pain and leg swelling. Gastrointestinal: Negative for abdominal distention, constipation, diarrhea, nausea and vomiting. Genitourinary: Positive for frequency. Negative for difficulty urinating and dysuria. Skin: Negative. Neurological: Positive for headaches. Negative for dizziness, tremors, weakness, light-headedness and numbness. Physical Exam  Vitals reviewed. Constitutional:       Appearance: Normal appearance. HENT:      Head: Normocephalic and atraumatic. Nose: Nose normal.      Mouth/Throat:      Mouth: Mucous membranes are moist.      Pharynx: Oropharynx is clear. Eyes:      Extraocular Movements: Extraocular movements intact.       Conjunctiva/sclera: Conjunctivae normal.      Pupils: Pupils are equal, round, and reactive to light.      Comments: Tunnel vision -negative   Cardiovascular:      Rate and Rhythm: Normal rate and regular rhythm. Pulses: Normal pulses. Heart sounds: Normal heart sounds. Pulmonary:      Effort: Pulmonary effort is normal.      Breath sounds: Normal breath sounds. Abdominal:      General: There is no distension. Palpations: Abdomen is soft. There is no mass. Tenderness: There is no abdominal tenderness. Musculoskeletal:      Cervical back: Normal range of motion and neck supple. Right lower leg: No edema. Left lower leg: No edema. Skin:     Capillary Refill: Capillary refill takes 2 to 3 seconds. Neurological:      General: No focal deficit present. Mental Status: She is alert. Psychiatric:         Mood and Affect: Mood normal.       Procedures   none    MDM  Number of Diagnoses or Management Options  Other migraine without status migrainosus, not intractable  Diagnosis management comments: Vianey Shea is a 69-year-old female presenting with 2 episodes of sudden throbbing headaches. Patient has a history of pituitary cyst and family history of aneurysms, crease suspicion for pituitary cyst burst, need to rule out subarachnoid hemorrhage or aneurysm. CMP,CBC and POC ordered. Patient given medications-acetaminophen diphenhydramine, metoclopramide, well-tolerated. Patient potassium low -managed with potassium bicarb, well-tolerated by patient. CBC and magnesium unremarkable. CT of the head with contrast shows no acute intracranial abnormalities and reassuring for no acute process. Repeat physical examination on the patient reassuring, states headache is decreased she is feeling better just tired, patient remained hemodynamically stable, afebrile, patient  ready for discharge. Patient to follow-up with PCP as needed.         --------------------------------------------- PAST HISTORY ---------------------------------------------  Past Medical History:  has a past medical history of Blood in urine, Constipation, Endometriosis, H/O superficial phlebitis, Migraines, Raynaud's disease, Superficial phlebitis of leg, right, and Varicose veins of leg with pain, right. Past Surgical History:  has a past surgical history that includes Appendectomy; hernia repair; Ankle surgery; Cholecystectomy; Tonsillectomy; laparoscopy; and Cystoscopy. Social History:  reports that she has never smoked. She has never used smokeless tobacco. She reports that she does not drink alcohol and does not use drugs. Family History: family history includes Allergy (Severe) in her father; Cancer in her paternal cousin; Heart Disease in her father and mother; Other in her maternal aunt, maternal grandmother, and mother; Thyroid Disease in her mother. The patients home medications have been reviewed.     Allergies: Morphine, Oxycodone, Tomato, Alprazolam, Hydrocodone-acetaminophen, Influenza vaccines, Oseltamivir, Oxycontin [oxycodone hcl], Tamiflu [oseltamivir phosphate], Adhesive tape, and Oseltamivir phosphate    -------------------------------------------------- RESULTS -------------------------------------------------  Labs:  Results for orders placed or performed during the hospital encounter of 30/12/90   Basic Metabolic Panel w/ Reflex to MG   Result Value Ref Range    Sodium 144 132 - 146 mmol/L    Potassium reflex Magnesium 3.2 (L) 3.5 - 5.0 mmol/L    Chloride 106 98 - 107 mmol/L    CO2 29 22 - 29 mmol/L    Anion Gap 9 7 - 16 mmol/L    Glucose 94 74 - 99 mg/dL    BUN 17 6 - 20 mg/dL    CREATININE 0.6 0.5 - 1.0 mg/dL    GFR Non-African American >60 >=60 mL/min/1.73    GFR African American >60     Calcium 9.4 8.6 - 10.2 mg/dL   CBC Auto Differential   Result Value Ref Range    WBC 7.5 4.5 - 11.5 E9/L    RBC 4.83 3.50 - 5.50 E12/L    Hemoglobin 14.0 11.5 - 15.5 g/dL    Hematocrit 43.8 34.0 - 48.0 %    MCV 90.7 80.0 - 99.9 fL    MCH 29.0 26.0 - 35.0 pg    MCHC 32.0 32.0 - 34.5 %    RDW 12.9 11.5 - 15.0 fL    Platelets 153 185 - 795 E9/L    MPV 9.4 7.0 - 12.0 fL    Neutrophils % 52.4 43.0 - 80.0 %    Immature Granulocytes % 0.1 0.0 - 5.0 %    Lymphocytes % 35.7 20.0 - 42.0 %    Monocytes % 7.4 2.0 - 12.0 %    Eosinophils % 3.5 0.0 - 6.0 %    Basophils % 0.9 0.0 - 2.0 %    Neutrophils Absolute 3.91 1.80 - 7.30 E9/L    Immature Granulocytes # 0.01 E9/L    Lymphocytes Absolute 2.67 1.50 - 4.00 E9/L    Monocytes Absolute 0.55 0.10 - 0.95 E9/L    Eosinophils Absolute 0.26 0.05 - 0.50 E9/L    Basophils Absolute 0.07 0.00 - 0.20 E9/L   Magnesium   Result Value Ref Range    Magnesium 2.2 1.6 - 2.6 mg/dL   POC Pregnancy Urine   Result Value Ref Range    HCG, Urine, POC Negative Negative    Lot Number [id number]     Positive QC Pass/Fail Pass     Negative QC Pass/Fail Pass        Radiology:  CTA HEAD W CONTRAST   Final Result   1. No acute intracranial abnormality. 2. Unremarkable CTA of the head.             ------------------------- NURSING NOTES AND VITALS REVIEWED ---------------------------  Date / Time Roomed:  9/26/2021  7:45 AM  ED Bed Assignment:  15/15    The nursing notes within the ED encounter and vital signs as below have been reviewed. BP (!) 119/56   Pulse 80   Temp 98.6 °F (37 °C)   Resp 18   Ht 5' 5\" (1.651 m)   Wt 185 lb (83.9 kg)   SpO2 98%   BMI 30.79 kg/m²   Oxygen Saturation Interpretation: Normal      ------------------------------------------ PROGRESS NOTES ------------------------------------------  10:30 AM EDT    Reexamined patient patient states she is doing much better headache is decreased. And is reassured by the results and imaging discussed with her. She agrees with the plan to be discharged with the Zofran for nausea prednisone to decrease inflammation for the next 4 days and sumatriptan as needed when migraines about a start.      I have spoken with the patient and discussed todays results, in addition to providing specific details for the plan of care and counseling regarding the diagnosis and prognosis. Their questions are answered at this time and they are agreeable with the plan. I discussed at length with them reasons for immediate return here for re evaluation. They will followup with their PCP as needed. --------------------------------- ADDITIONAL PROVIDER NOTES ---------------------------------  At this time the patient is without objective evidence of an acute process requiring hospitalization or inpatient management. They have remained hemodynamically stable throughout their entire ED visit and are stable for discharge with outpatient follow-up. The plan has been discussed in detail and they are aware of the specific conditions for emergent return, as well as the importance of follow-up. New Prescriptions    ONDANSETRON (ZOFRAN-ODT) 4 MG DISINTEGRATING TABLET    Take 1 tablet by mouth 3 times daily as needed for Nausea or Vomiting    PREDNISONE (DELTASONE) 10 MG TABLET    Take 1 tablet by mouth 2 times daily for 4 days    SUMATRIPTAN (IMITREX) 25 MG TABLET    Take 1 tablet by mouth daily as needed for Migraine       Diagnosis:  1. Other migraine without status migrainosus, not intractable Improving       Disposition:  Patient's disposition: Discharge to home  Patient's condition is stable.        Shyann Garnett MD  Resident  09/26/21 [id number]

## 2021-09-29 ENCOUNTER — TELEPHONE (OUTPATIENT)
Dept: PRIMARY CARE CLINIC | Age: 51
End: 2021-09-29

## 2021-09-29 NOTE — TELEPHONE ENCOUNTER
----- Message from Marsha Minor sent at 9/29/2021 11:16 AM EDT -----  Subject: Hospital Follow Up    QUESTIONS  What hospital was the Patient Discharged from? [id number]  Date of Discharge? 2021-09-26  Discharge Location? Home  Reason for hospitalization as patient stated? severe migraine  What question does the patient have, if applicable? was told to call and   make appointment  ---------------------------------------------------------------------------  --------------  CALL BACK INFO  What is the best way for the office to contact you? OK to leave message on   voicemail  Preferred Call Back Phone Number? [phone]  ---------------------------------------------------------------------------  --------------  SCRIPT ANSWERS  Relationship to Patient?  Self

## 2021-10-08 ENCOUNTER — OFFICE VISIT (OUTPATIENT)
Dept: PRIMARY CARE CLINIC | Age: 51
End: 2021-10-08
Payer: COMMERCIAL

## 2021-10-08 VITALS
TEMPERATURE: 97.1 F | HEART RATE: 75 BPM | BODY MASS INDEX: 29.62 KG/M2 | SYSTOLIC BLOOD PRESSURE: 128 MMHG | DIASTOLIC BLOOD PRESSURE: 70 MMHG | OXYGEN SATURATION: 94 % | WEIGHT: 178 LBS

## 2021-10-08 DIAGNOSIS — D49.7 PITUITARY NEOPLASM: ICD-10-CM

## 2021-10-08 DIAGNOSIS — G89.29 CHRONIC INTRACTABLE HEADACHE, UNSPECIFIED HEADACHE TYPE: ICD-10-CM

## 2021-10-08 DIAGNOSIS — Z00.00 HEALTH MAINTENANCE EXAMINATION: ICD-10-CM

## 2021-10-08 DIAGNOSIS — E87.6 HYPOKALEMIA: ICD-10-CM

## 2021-10-08 DIAGNOSIS — B96.89 ACUTE BACTERIAL SINUSITIS: ICD-10-CM

## 2021-10-08 DIAGNOSIS — J01.90 ACUTE BACTERIAL SINUSITIS: ICD-10-CM

## 2021-10-08 DIAGNOSIS — R51.9 CHRONIC INTRACTABLE HEADACHE, UNSPECIFIED HEADACHE TYPE: ICD-10-CM

## 2021-10-08 DIAGNOSIS — G43.811 OTHER MIGRAINE WITH STATUS MIGRAINOSUS, INTRACTABLE: Primary | ICD-10-CM

## 2021-10-08 PROCEDURE — 96372 THER/PROPH/DIAG INJ SC/IM: CPT | Performed by: FAMILY MEDICINE

## 2021-10-08 PROCEDURE — 99215 OFFICE O/P EST HI 40 MIN: CPT | Performed by: FAMILY MEDICINE

## 2021-10-08 RX ORDER — TOPIRAMATE 25 MG/1
TABLET ORAL
Qty: 120 TABLET | Refills: 1 | Status: SHIPPED
Start: 2021-10-08 | End: 2021-10-21 | Stop reason: SINTOL

## 2021-10-08 RX ORDER — KETOROLAC TROMETHAMINE 30 MG/ML
30 INJECTION, SOLUTION INTRAMUSCULAR; INTRAVENOUS ONCE
Status: COMPLETED | OUTPATIENT
Start: 2021-10-08 | End: 2021-10-08

## 2021-10-08 RX ORDER — SUMATRIPTAN 50 MG/1
TABLET, FILM COATED ORAL
Qty: 9 TABLET | Refills: 3 | Status: SHIPPED
Start: 2021-10-08 | End: 2022-09-13 | Stop reason: SDUPTHER

## 2021-10-08 RX ORDER — CEFDINIR 300 MG/1
300 CAPSULE ORAL 2 TIMES DAILY
Qty: 20 CAPSULE | Refills: 0 | Status: SHIPPED | OUTPATIENT
Start: 2021-10-08 | End: 2021-10-18

## 2021-10-08 RX ORDER — PREDNISONE 10 MG/1
TABLET ORAL
Qty: 12 TABLET | Refills: 0 | Status: SHIPPED | OUTPATIENT
Start: 2021-10-08 | End: 2021-10-14

## 2021-10-08 RX ADMIN — KETOROLAC TROMETHAMINE 30 MG: 30 INJECTION, SOLUTION INTRAMUSCULAR; INTRAVENOUS at 11:15

## 2021-10-08 SDOH — ECONOMIC STABILITY: FOOD INSECURITY: WITHIN THE PAST 12 MONTHS, YOU WORRIED THAT YOUR FOOD WOULD RUN OUT BEFORE YOU GOT MONEY TO BUY MORE.: NEVER TRUE

## 2021-10-08 SDOH — ECONOMIC STABILITY: FOOD INSECURITY: WITHIN THE PAST 12 MONTHS, THE FOOD YOU BOUGHT JUST DIDN'T LAST AND YOU DIDN'T HAVE MONEY TO GET MORE.: NEVER TRUE

## 2021-10-08 ASSESSMENT — PATIENT HEALTH QUESTIONNAIRE - PHQ9
2. FEELING DOWN, DEPRESSED OR HOPELESS: 0
SUM OF ALL RESPONSES TO PHQ QUESTIONS 1-9: 0
1. LITTLE INTEREST OR PLEASURE IN DOING THINGS: 0
SUM OF ALL RESPONSES TO PHQ9 QUESTIONS 1 & 2: 0

## 2021-10-08 ASSESSMENT — SOCIAL DETERMINANTS OF HEALTH (SDOH): HOW HARD IS IT FOR YOU TO PAY FOR THE VERY BASICS LIKE FOOD, HOUSING, MEDICAL CARE, AND HEATING?: NOT HARD AT ALL

## 2021-10-08 NOTE — ASSESSMENT & PLAN NOTE
Counseled. Differential reviewed. Antibiotic as per EMR, standard precautions reviewed including C. Difficile. R/B probiotics reviewed. Mucinex as directed with precautions. Potential interactions reviewed. Proper hydration reviewed. Coolmist vaporizer as directed. Mono precautions reviewed. Counseled on nasal saline. Counseled on nasal steroid. Omnicef. Prednisone precautions take with NSAIDs. Covid precaution reviewed. Check Covid screen.

## 2021-10-08 NOTE — PROGRESS NOTES
8/15/19  Yaniv Rodas : 1970 Sex: female  Age: 45 y.o. Chief Complaint   Patient presents with    Follow-Up from Hospital    Sinusitis    Nausea    Migraine         HPI:      Patient presents today with migraine and ER follow-up. She is in her examination room with the lights off with an ice pack to her head. She states she had a different headache on  albeit not her worst headache. She states she was convinced to go to the ER which was a huge mistake. She states she was there for 12 hours. A sent her home with Imitrex and Zofran which she did not take. I gave her Reglan Benadryl and Isovue, potassium tablet. Blood work then showed a potassium low 3.2 BMP CBC otherwise normal, CT and CTA of the brain were negative. Previously saw a neurologist at [facility]. Willing to follow-up. States in the past couple weeks her headaches have been more frequent and more intractable although not the worst headache of her life. They migrate. Currently left-sided. When they are on the right side they cause blurred vision. They cause photophobia and nausea no phonophobia. No numbness tingling or focal weakness. Also complains of sinus pressure thick rhinorrhea and postnasal drainage. Had moderna vaccine x2. No loss of smell or taste, no fever chills. Migraine does cause nausea.         CBC  Lab Results   Component Value Date    WBC 7.5 2021    WBC 5.6 10/29/2020    WBC 6.2 2020    RBC 4.83 2021    RBC 4.94 10/29/2020    RBC 5.04 2020    HGB 14.0 2021    HGB 14.5 10/29/2020    HGB 14.4 2020    HCT 43.8 2021    HCT 44.6 10/29/2020    HCT 45.4 2020    MCV 90.7 2021    MCV 90.3 10/29/2020    MCV 90.1 2020     2021     10/29/2020     2020      CMP  Lab Results   Component Value Date     2021     10/29/2020     2020    K 3.2 2021    K 4.4 10/29/2020    K 3.4 2020    K 3.5 02/13/2020     09/26/2021     10/29/2020     02/19/2020    CO2 29 09/26/2021    CO2 27 10/29/2020    CO2 27 02/19/2020    ANIONGAP 9 09/26/2021    ANIONGAP 10 10/29/2020    ANIONGAP 11 02/19/2020    GLUCOSE 94 09/26/2021    GLUCOSE 88 10/29/2020    GLUCOSE 90 02/19/2020    BUN 17 09/26/2021    BUN 13 10/29/2020    BUN 7 02/19/2020    CREATININE 0.6 09/26/2021    CREATININE 0.7 10/29/2020    CREATININE 0.7 02/19/2020    LABGLOM >60 09/26/2021    LABGLOM >60 10/29/2020    LABGLOM >60 02/19/2020    GFRAA >60 09/26/2021    GFRAA >60 10/29/2020    GFRAA >60 02/19/2020    CALCIUM 9.4 09/26/2021    CALCIUM 9.3 10/29/2020    CALCIUM 9.0 02/19/2020    PROT 6.5 10/29/2020    PROT 6.7 02/19/2020    PROT 7.1 02/13/2020    LABALBU 4.1 10/29/2020    LABALBU 4.5 02/19/2020    LABALBU 4.8 02/13/2020    BILITOT 0.5 10/29/2020    BILITOT 0.5 02/19/2020    BILITOT 1.2 02/13/2020    ALKPHOS 102 10/29/2020    ALKPHOS 90 02/19/2020    ALKPHOS 111 02/13/2020    AST 14 10/29/2020     02/19/2020    AST 20 02/13/2020    ALT 11 10/29/2020    ALT 75 02/19/2020    ALT 16 02/13/2020     A1C  No results found for: LABA1C  TSH  Lab Results   Component Value Date    TSH 1.430 10/29/2020    TSH 1.600 08/19/2019    TSH 1.160 02/24/2015     FREET4  No results found for: H5EWAXU  LIPID  Lab Results   Component Value Date    CHOL 165 10/29/2020    CHOL 176 08/19/2019    CHOL 132 02/24/2015    HDL 74 10/29/2020    HDL 77 08/19/2019    HDL 70 02/24/2015    LDLCALC 67 10/29/2020    LDLCALC 85 08/19/2019    LDLCALC 50 02/24/2015    TRIG 121 10/29/2020    TRIG 71 08/19/2019    TRIG 61 02/24/2015     VITAMIN D  No results found for: VITD25  MAGNESIUM  Lab Results   Component Value Date    MG 2.2 09/26/2021      PHOS  No results found for: PHOS   JAZMYNE   Lab Results   Component Value Date    JAZMYNE NEGATIVE 08/19/2019     RHEUMATOID FACTOR  Lab Results   Component Value Date    RF 10 08/19/2019     PSA  No results found for: PSA   HEPATITIS C  Lab Results   Component Value Date    HCVABI Non-Reactive 10/29/2020     HIV  No results found for: MVO3SPK, HIV1QT  UA  Lab Results   Component Value Date    COLORU Yellow 10/29/2020    COLORU Yellow 02/19/2020    COLORU yellow 02/19/2020    COLORU Yellow 02/13/2020    CLARITYU Clear 10/29/2020    CLARITYU Clear 02/19/2020    CLARITYU clean 02/19/2020    CLARITYU Clear 02/13/2020    GLUCOSEU Negative 10/29/2020    GLUCOSEU Negative 02/19/2020    GLUCOSEU neg 02/19/2020    GLUCOSEU Negative 02/13/2020    BILIRUBINUR Negative 10/29/2020    BILIRUBINUR Negative 02/19/2020    BILIRUBINUR neg 02/19/2020    BILIRUBINUR Negative 02/13/2020    BILIRUBINUR neg 12/28/2019    BILIRUBINUR neg 07/19/2019    KETUA Negative 10/29/2020    KETUA Negative 02/19/2020    KETUA 15mg 02/19/2020    KETUA Negative 02/13/2020    SPECGRAV 1.020 10/29/2020    SPECGRAV <=1.005 02/19/2020    SPECGRAV 1.030 02/19/2020    SPECGRAV >=1.030 02/13/2020    BLOODU TRACE-INTACT 10/29/2020    BLOODU TRACE-INTACT 02/19/2020    BLOODU moderate 02/19/2020    BLOODU LARGE 02/13/2020    PHUR 8.0 10/29/2020    PHUR 6.5 02/19/2020    PHUR 5.5 02/19/2020    PHUR 5.5 02/13/2020    PROTEINU Negative 10/29/2020    PROTEINU Negative 02/19/2020    PROTEINU neg 02/19/2020    PROTEINU Negative 02/13/2020    UROBILINOGEN 0.2 10/29/2020    UROBILINOGEN 0.2 02/19/2020    UROBILINOGEN 0.2 02/13/2020    NITRU Negative 10/29/2020    NITRU Negative 02/19/2020    NITRU Negative 02/13/2020    LEUKOCYTESUR Negative 10/29/2020    LEUKOCYTESUR Negative 02/19/2020    LEUKOCYTESUR small 02/19/2020    LEUKOCYTESUR Negative 02/13/2020     Urine Micro/Albumin Ratio  No results found for: MALBCR            ROS:  Const: Denies chills, fever, malaise and sweats. Eyes: Denies discharge, pain, redness, blurred vision from certain headaches  ENMT: Denies earaches, other ear symptoms. Denies nasal or sinus symptoms other than stated  above.  Denies mouth and tongue lesions and sore throat. CV: Denies chest discomfort, pain; diaphoresis, dizziness, edema, lightheadedness, orthopnea,  palpitations, syncope and near syncopal episode or any exertional symptoms  Resp: Denies cough, hemoptysis, pleuritic pain, SOB, sputum production and wheezing. GI: Denies abdominal pain, change in bowel habits, hematochezia, melena,  and vomiting. Nausea from headache  : Denies urinary symptoms including dysuria , urgency, frequency or hematuria. Musculo: Denies musculoskeletal symptoms  Skin: Denies bruising and rash. Neuro: Denies  numbness, stiff neck, tingling and focal weakness slurred speech or facial  droop, headache as above  Hema/Lymph: Denies bleeding/bruising tendency and enlarged lymph nodes        Current Outpatient Medications:     predniSONE (DELTASONE) 10 MG tablet, Take 3 tablets by mouth daily for 2 days, THEN 2 tablets daily for 2 days, THEN 1 tablet daily for 2 days. , Disp: 12 tablet, Rfl: 0    cefdinir (OMNICEF) 300 MG capsule, Take 1 capsule by mouth 2 times daily for 10 days, Disp: 20 capsule, Rfl: 0    SUMAtriptan (IMITREX) 50 MG tablet, 1/2-1 po at onset of migraine PRN, may repeat x 1 in 2hrs, Disp: 9 tablet, Rfl: 3    topiramate (TOPAMAX) 25 MG tablet, 1 po qhs x 1week; then  if tolerating but ineffective, then 1 po bid x 1week; if tolerating but ineffective increase to 1 po qam, 2 po qhs x 1 week; if tolerating but ineffective 2 po bid maintaining lowest effective dose, Disp: 120 tablet, Rfl: 1    albuterol sulfate  (90 Base) MCG/ACT inhaler, Inhale 2 puffs into the lungs 4 times daily as needed for Wheezing, Disp: 1 Inhaler, Rfl: 0    chlorpheniramine (ALLER-CHLOR) 4 MG tablet, Take 1 tablet by mouth every 6 hours as needed for Allergies, Disp: 20 tablet, Rfl: 0    aspirin 81 MG EC tablet, Take 81 mg by mouth daily, Disp: , Rfl:     ondansetron (ZOFRAN-ODT) 4 MG disintegrating tablet, Take 1 tablet by mouth 3 times daily as needed for Nausea or Vomiting (Patient not taking: Reported on 10/8/2021), Disp: 21 tablet, Rfl: 0  Allergies   Allergen Reactions    Morphine      Reaction is reddened skin and patient \"blacks out\"    Oxycodone Anxiety    Tomato Anaphylaxis    Alprazolam      Reaction is hallucinations  hallucinations    Hydrocodone-Acetaminophen      GI upset    Influenza Vaccines      ?  Neuro issue and neurologist said not to get again    Oseltamivir     Oxycontin [Oxycodone Hcl]      Reaction is reddened skin and patient \"blacks out\"    Tamiflu [Oseltamivir Phosphate]      Reaction is hallucinations    Adhesive Tape Rash     Reaction is sloughing of skin  Skin comes off    Oseltamivir Phosphate Nausea And Vomiting       Past Medical History:   Diagnosis Date    Blood in urine     history of    Constipation     Endometriosis     H/O superficial phlebitis 4/29/2021    Migraines     Raynaud's disease     Superficial phlebitis of leg, right 9/30/2020    Varicose veins of leg with pain, right 9/30/2020     Past Surgical History:   Procedure Laterality Date    ANKLE SURGERY      APPENDECTOMY      CHOLECYSTECTOMY      CYSTOSCOPY      HERNIA REPAIR      LAPAROSCOPY      multiple for endometriosis    TONSILLECTOMY       Family History   Problem Relation Age of Onset    Heart Disease Mother     Thyroid Disease Mother     Other Mother         kidney problems    Heart Disease Father     Allergy (Severe) Father         seasonal    Other Maternal Grandmother         brain tumor    Other Maternal Aunt         aneurysm    Cancer Paternal Cousin         ovarian      Social History     Tobacco Use    Smoking status: Never Smoker    Smokeless tobacco: Never Used   Vaping Use    Vaping Use: Never used   Substance Use Topics    Alcohol use: No    Drug use: No      Social History     Social History Narrative    PMH:    Medical Problems:    Endometriosis, Raynauds, Migraine    Surgical Hx:    Appendectomy, Removal of Gallbladder    Laparoscopy - unremarkable at this time. Skin: Dry and warm with no rash. Skin normal to inspection and palpation overall. Neuro: Alert and oriented. Affect: appropriate. Upper Extremities: 5/5 bilaterally. Lower Extremities:  5/5 bilaterally. Sensation intact to light touch. Reflexes: DTR's are symmetric and 2+ bilaterally. .  Cranial Nerves: Cranial nerves grossly intact. Assessment and Plan:   Diagnosis Orders   1. Other migraine with status migrainosus, intractable  External Referral To Neurology    ketorolac (TORADOL) injection 30 mg    predniSONE (DELTASONE) 10 MG tablet    SUMAtriptan (IMITREX) 50 MG tablet    topiramate (TOPAMAX) 25 MG tablet   2. Chronic intractable headache, unspecified headache type  External Referral To Neurology   3. Acute bacterial sinusitis  cefdinir (OMNICEF) 300 MG capsule    Covid-19 Ambulatory    CBC Auto Differential   4. Hypokalemia  Comprehensive Metabolic Panel   5. Pituitary neoplasm  TSH without Reflex    T4, Free    PROLACTIN   6. Health maintenance examination  CBC Auto Differential    Comprehensive Metabolic Panel    TSH without Reflex    Lipid Panel    Urinalysis     Headache  Counseled extensively. Differential reviewed, including serious etiologies. Although migraine likely other differential reviewed. Wants to proceed as below. Migraine  Counseled extensively. Differential reviewed, including serious etiologies.  She should be but has not followed with her neurologist at [facility]. Refer back today.  Used to take Topamax. She thought it affected her memory. Nose runs after migraine. Discussed vasomotor rhinitis. declines gabapentin or otherwise now.   Recommended avoidance of triggers . Counseled on analgesic rebound ha's. higher risk of cva reviewed. signs and symptoms to watch for discussed. Serious signs and symptoms  reviewed. ER if any. In summary, she is agreeable to Toradol injection today with precautions including shot injury and NSAID precautions.   She may cautiously use Zofran as directed as needed. Prednisone taper with precautions not to take with NSAIDs. Restart Topamax, titrate if needed if able with standard precautions. Wrote for Imitrex with precautions including vasoconstrictive properties. Declines ER hospitalization now. Pituitary neoplasm  Counseled extensively. Differential reviewed, including serious etiologies. Blood  work negative in past.  Prolactin 10/20 was negative. Recheck blood work. Now willing to have repeat MRI.  Declines seeing endocrinology or neurosurgery.  Should follow-up neurology. MRI 3/16 again. Summary was     Again I recommended she consider seeing at least an endocrinologist     Hypokalemia    Counseled extensively. Differential reviewed, including serious etiologies. Noted in ER and supplemented in ER. Appropriate intake reviewed. Risk of low and high reviewed. Recheck. Acute bacterial sinusitis  Counseled. Differential reviewed. Antibiotic as per EMR, standard precautions reviewed including C. Difficile. R/B probiotics reviewed. Mucinex as directed with precautions. Potential interactions reviewed. Proper hydration reviewed. Coolmist vaporizer as directed. Mono precautions reviewed. Counseled on nasal saline. Counseled on nasal steroid. Omnicef. Prednisone precautions take with NSAIDs. Covid precaution reviewed. Check Covid screen. Past problems for continuity purposes  Elevated liver enzymes  Counseled extensively. Differential reviewed. I noticed this from blood work drawn in the ER 2/20. Resolved/normalized. Hepatitis A/B/C- as of 11/2020; not immune to hepatitis A/B-defers vaccine at this time           Superficial phlebitis  Counseled extensively. Differential reviewed, including serious etiologies. Continue per Dr. Jessica Uribe. Currently on baby aspirin. Counseled other treatment options depending on size and location.   Counseled on leg elevation, compression stockings compliance, low-salt diet. Counseled on risk benefits of NSAIDs. Restrictions reviewed. Has had ultrasounds to assess for thrombosis but reflux ultrasound pending. Varicose veins of both lower extremities with inflammation  Counseled. Continue per Dr. Darian Andrade             Overweight (BMI 30)  Counseled. Risk reviewed. Declines formal intervention. Counseled on weight watchers, Mediterranean diet.     Allergic rhinitis  May try nasal saline, nasal steroid daily, Allegra or equivalent daily as needed.  Counseled on singular     Health maintenance examination  Health maintenance issues discussed at length  10/20. Encouraged yearly physicals. Encouraged shingrix. FIT cards are due After 11/14/2020, counseled; mammogram 10/20 -            Hematuria  Counseled extensively. Differential reviewed, including serious etiologies. States neg parr in past through urologist.  Declines follow-up unless symptoms      Insomnia  Counseled, at this point would like to start with good sleep hygiene, pros and cons of pharmacotherapy reviewed, side effects ADRs. She is going to consider melatonin as a starting point. Has benefited from Benadryl type products in the past as well. No flowsheet data found. Plan as above. Counseled extensively and differential diagnoses relevant to above were reviewed, including serious etiologies. Side effects and interactions of medications were reviewed. Extensive conversation/counseling to my concerns reviewed. Agrees to Toradol injection today. Prednisone taper precautions. Omnicef. She may use prn Zofran sparingly Wrote for Imitrex with appropriate use side effects instructions interactions reviewed. Start Topamax. Check blood work.,    As long as symptoms steadily improve/resolve, and medical conditions follow the expected course, FU as below, sooner PRN. Return in about 1 week (around 10/15/2021), or if symptoms worsen or fail to improve.      Over 40 minutes  spent with the patient in reviewing records, reviewing with patient/family, counseling, ordering,  prescribing, completing h&p, etc., with over 50% of the time spent face to face counseling. Despite discussion, pt declines ER or other evaluation or treatment other than above. If they  change their mind, symptoms persist or worsen, or other serious signs or sympoms, they are to  go to ER immediately as instructed. They understand my concerns and accept the risk of not  complying including sudden debillitating/fatal event and risk of untreatable condition if not  properly dx/tx early. If they continue to decline ER, but change mind on further evaluation or  treatment, notify immediately. Otherwise at least follow up as above, sooner prn. Julio C Jackson MD    Patients are advised to check with insurance company to ensure coverage and to fully understand benefits and cost prior to any testing. This note was created with the assistance of voice recognition software. Document was reviewed however may contain grammatical errors.

## 2021-10-10 LAB
SARS-COV-2: NOT DETECTED
SOURCE: NORMAL

## 2021-10-11 ENCOUNTER — TELEPHONE (OUTPATIENT)
Dept: PRIMARY CARE CLINIC | Age: 51
End: 2021-10-11

## 2021-10-11 NOTE — TELEPHONE ENCOUNTER
The pt was her to see you on Friday for a hospital follow up and talked to you about her having some sinus issues and that she has been having some migraines.  You prescribed her some new medications (prednisone, cefdinir, sumatriptan, topiramate) she is calling to see if she can take these new medications you prescribed her with the aspirin she takes because she has a blood clot

## 2021-10-19 ENCOUNTER — TELEPHONE (OUTPATIENT)
Dept: PRIMARY CARE CLINIC | Age: 51
End: 2021-10-19

## 2021-10-19 ENCOUNTER — OFFICE VISIT (OUTPATIENT)
Dept: FAMILY MEDICINE CLINIC | Age: 51
End: 2021-10-19
Payer: COMMERCIAL

## 2021-10-19 VITALS
HEIGHT: 66 IN | RESPIRATION RATE: 20 BRPM | WEIGHT: 176 LBS | DIASTOLIC BLOOD PRESSURE: 88 MMHG | BODY MASS INDEX: 28.28 KG/M2 | TEMPERATURE: 96.4 F | OXYGEN SATURATION: 98 % | HEART RATE: 68 BPM | SYSTOLIC BLOOD PRESSURE: 122 MMHG

## 2021-10-19 DIAGNOSIS — R51.9 NONINTRACTABLE HEADACHE, UNSPECIFIED CHRONICITY PATTERN, UNSPECIFIED HEADACHE TYPE: Primary | ICD-10-CM

## 2021-10-19 PROCEDURE — 99214 OFFICE O/P EST MOD 30 MIN: CPT | Performed by: PHYSICIAN ASSISTANT

## 2021-10-19 PROCEDURE — 96372 THER/PROPH/DIAG INJ SC/IM: CPT | Performed by: PHYSICIAN ASSISTANT

## 2021-10-19 RX ORDER — METHYLPREDNISOLONE ACETATE 80 MG/ML
60 INJECTION, SUSPENSION INTRA-ARTICULAR; INTRALESIONAL; INTRAMUSCULAR; SOFT TISSUE ONCE
Status: COMPLETED | OUTPATIENT
Start: 2021-10-19 | End: 2021-10-19

## 2021-10-19 RX ORDER — KETOROLAC TROMETHAMINE 30 MG/ML
30 INJECTION, SOLUTION INTRAMUSCULAR; INTRAVENOUS ONCE
Status: COMPLETED | OUTPATIENT
Start: 2021-10-19 | End: 2021-10-19

## 2021-10-19 RX ORDER — PROMETHAZINE HYDROCHLORIDE 25 MG/ML
25 INJECTION, SOLUTION INTRAMUSCULAR; INTRAVENOUS ONCE
Status: COMPLETED | OUTPATIENT
Start: 2021-10-19 | End: 2021-10-19

## 2021-10-19 RX ORDER — DIPHENHYDRAMINE HYDROCHLORIDE 50 MG/ML
50 INJECTION INTRAMUSCULAR; INTRAVENOUS ONCE
Status: COMPLETED | OUTPATIENT
Start: 2021-10-19 | End: 2021-10-19

## 2021-10-19 RX ADMIN — PROMETHAZINE HYDROCHLORIDE 25 MG: 25 INJECTION, SOLUTION INTRAMUSCULAR; INTRAVENOUS at 16:18

## 2021-10-19 RX ADMIN — DIPHENHYDRAMINE HYDROCHLORIDE 50 MG: 50 INJECTION INTRAMUSCULAR; INTRAVENOUS at 16:16

## 2021-10-19 RX ADMIN — METHYLPREDNISOLONE ACETATE 60 MG: 80 INJECTION, SUSPENSION INTRA-ARTICULAR; INTRALESIONAL; INTRAMUSCULAR; SOFT TISSUE at 16:18

## 2021-10-19 RX ADMIN — KETOROLAC TROMETHAMINE 30 MG: 30 INJECTION, SOLUTION INTRAMUSCULAR; INTRAVENOUS at 16:17

## 2021-10-19 NOTE — TELEPHONE ENCOUNTER
Discontinue medication. Needs to see her neurologist ASAP especially given the complexities of her situation. Make follow-up appointment with me ASAP.   In the meantime express care if warranted or ER if any serious signs or symptoms

## 2021-10-19 NOTE — PROGRESS NOTES
10/19/21  Yaniv Rodas : 1970 Sex: female  Age 45 y.o. Subjective:  Chief Complaint   Patient presents with    Medication Reaction    Dizziness    Migraine    Emesis         HPI:   Yaniv Rodas , 45 y.o. female presents to express care for evaluation of migraine headache    HPI  40-year-old female with a history of migraine headaches presents to [facility] for evaluation of migraine headache. The patient states that she was recently started on Topamax 25 mg. She has had some side effects with this in the past but they decided to try this again. The patient does have some of her typical agents at home. States she started this about 4 5 days ago she has had increased headaches. The patient has had some vomiting and diarrhea. The patient states that she has headache that seems to be somewhat consistent with her migraine headaches. The patient was noted to have to take the dose last night but she has not continued with this. The patient is not having any lateralizing weakness. She brought herself to [facility]. She just was in the ER and had a CTA of the head. ROS:   Unless otherwise stated in this report the patient's positive and negative responses for review of systems for constitutional, eyes, ENT, cardiovascular, respiratory, gastrointestinal, neurological, , musculoskeletal, and integument systems and related systems to the presenting problem are either stated in the history of present illness or were not pertinent or were negative for the symptoms and/or complaints related to the presenting medical problem. Positives and pertinent negatives as per HPI. All others reviewed and are negative.       PMH:     Past Medical History:   Diagnosis Date    Blood in urine     history of    Constipation     Endometriosis     H/O superficial phlebitis 2021    Migraines     Raynaud's disease     Superficial phlebitis of leg, right 2020    Varicose veins of leg with pain, right 9/30/2020       Past Surgical History:   Procedure Laterality Date    ANKLE SURGERY      APPENDECTOMY      CHOLECYSTECTOMY      CYSTOSCOPY      HERNIA REPAIR      LAPAROSCOPY      multiple for endometriosis    TONSILLECTOMY         Family History   Problem Relation Age of Onset    Heart Disease Mother     Thyroid Disease Mother     Other Mother         kidney problems    Heart Disease Father     Allergy (Severe) Father         seasonal    Other Maternal Grandmother         brain tumor    Other Maternal Aunt         aneurysm    Cancer Paternal Cousin         ovarian        Medications:     Current Outpatient Medications:     SUMAtriptan (IMITREX) 50 MG tablet, 1/2-1 po at onset of migraine PRN, may repeat x 1 in 2hrs, Disp: 9 tablet, Rfl: 3    topiramate (TOPAMAX) 25 MG tablet, 1 po qhs x 1week; then  if tolerating but ineffective, then 1 po bid x 1week; if tolerating but ineffective increase to 1 po qam, 2 po qhs x 1 week; if tolerating but ineffective 2 po bid maintaining lowest effective dose, Disp: 120 tablet, Rfl: 1    ondansetron (ZOFRAN-ODT) 4 MG disintegrating tablet, Take 1 tablet by mouth 3 times daily as needed for Nausea or Vomiting (Patient not taking: Reported on 10/8/2021), Disp: 21 tablet, Rfl: 0    albuterol sulfate  (90 Base) MCG/ACT inhaler, Inhale 2 puffs into the lungs 4 times daily as needed for Wheezing, Disp: 1 Inhaler, Rfl: 0    chlorpheniramine (ALLER-CHLOR) 4 MG tablet, Take 1 tablet by mouth every 6 hours as needed for Allergies, Disp: 20 tablet, Rfl: 0    aspirin 81 MG EC tablet, Take 81 mg by mouth daily, Disp: , Rfl:     Allergies: Allergies   Allergen Reactions    Morphine      Reaction is reddened skin and patient \"blacks out\"    Oxycodone Anxiety    Tomato Anaphylaxis    Alprazolam      Reaction is hallucinations  hallucinations    Hydrocodone-Acetaminophen      GI upset    Influenza Vaccines      ?  Neuro issue and neurologist said not to get again    Oseltamivir     Oxycontin [Oxycodone Hcl]      Reaction is reddened skin and patient \"blacks out\"    Tamiflu [Oseltamivir Phosphate]      Reaction is hallucinations    Adhesive Tape Rash     Reaction is sloughing of skin  Skin comes off    Oseltamivir Phosphate Nausea And Vomiting       Social History:     Social History     Tobacco Use    Smoking status: Never Smoker    Smokeless tobacco: Never Used   Vaping Use    Vaping Use: Never used   Substance Use Topics    Alcohol use: No    Drug use: No       Patient lives at home. Physical Exam:     Vitals:    10/19/21 1538   BP: 122/88   Site: Right Upper Arm   Position: Sitting   Cuff Size: Medium Adult   Pulse: 68   Resp: 20   Temp: 96.4 °F (35.8 °C)   TempSrc: Temporal   SpO2: 98%   Weight: 176 lb (79.8 kg)   Height: 5' 6\" (1.676 m)       Exam:  Physical Exam  Vital signs and nurses notes reviewed. The patient is not hypoxic. ? General: The patient appears well and in no apparent distress. Patient is resting comfortably on cart. Skin: Warm, dry, no pallor noted. The patient has no evidence of rash, petechiae, or purpura noted. Head: Normocephalic, atraumatic, no temporal arterial tenderness  Neck: Supple, trachea mid-line, no tenderness, no lymphadenopathy. No meningeal signs. No nuchal rigidity. Negative jolt test.  Eye: Pupils are equal, round and reactive to light, EOMI  Ears, Nose, Mouth, and Throat: Oral mucosa is moist, TMs are clear bilaterally, no hemotympanum noted. Cardiovascular: Regular Rate and Rhythm  Respiratory: Patient is in no distress, no accessory muscle use, lungs are clear to auscultation, no wheezing, rales or rhonchi  Back: non-tender, no CVA tenderness  Musculoskeletal: normal ROM, no tenderness, no swelling, normal strength 5/5. Normal pulses to radial 2+ bilaterally and 2+ at [facility] and PT bilaterally and symmetrically. GI: Normal bowel sounds, no tenderness to palpation, no masses appreciated.  No rebound, OTHER: No dural venous sinus thrombosis on this non-dedicated study. 1. No acute intracranial abnormality. 2. Unremarkable CTA of the head. Medical Decision Making:     Vital signs reviewed    Past medical history reviewed. Allergies reviewed. Medications reviewed. Patient on arrival does not appear to be in any apparent distress or discomfort. The patient has been seen and evaluated. The patient does not appear to be toxic or lethargic. The patient had the CTA reviewed and there was no evidence of acute intracranial abnormality. The patient does not appear to be toxic although she does seem to be uncomfortable. The patient does have vital signs reviewed that are noted to be within normal limits. She is afebrile. She is not tachycardic. We discussed differential diagnosis. The patient will be treated with intramuscular injection of Phenergan, Benadryl, ketorolac, and methylprednisone. We did observe the patient here in the office for about 30 to 40 minutes while she was awaiting her ride. She did seem to have a vast improvement of the symptoms. The symptoms are resolving at this point. Although not completely gone. The patient will discontinue the Topamax. Follow-up with PCP. The patient will call with any questions or concerns. The patient understands plan has no other questions at this time. Continue to push fluids at home. The patient is to return to express care or go directly to the emergency department should any of the signs or symptoms worsen. The patient is to followup with primary care physician in 2-3 days for repeat evaluation. The patient has no other questions or concerns at this time the patient will be discharged home. Clinical Impression:   Neil Jalloh was seen today for medication reaction, dizziness, migraine and emesis.     Diagnoses and all orders for this visit:    Nonintractable headache, unspecified chronicity pattern, unspecified headache type    Other orders  -     methylPREDNISolone acetate (DEPO-MEDROL) injection 60 mg  -     ketorolac (TORADOL) injection 30 mg  -     diphenhydrAMINE (BENADRYL) injection 50 mg  -     promethazine (PHENERGAN) injection 25 mg        The patient is to call for any concerns or return if any of the signs or symptoms worsen. The patient is to follow-up with PCP in the next 2-3 days for repeat evaluation repeat assessment or go directly to the emergency department.      SIGNATURE: Kisha Dial III, PA-C

## 2021-10-19 NOTE — TELEPHONE ENCOUNTER
Must be seen for that level of symptoms. Please triage.   At a minimum express care IF appropriate otherwise emergency room

## 2021-10-21 ENCOUNTER — OFFICE VISIT (OUTPATIENT)
Dept: PRIMARY CARE CLINIC | Age: 51
End: 2021-10-21
Payer: COMMERCIAL

## 2021-10-21 VITALS
DIASTOLIC BLOOD PRESSURE: 60 MMHG | TEMPERATURE: 97.6 F | BODY MASS INDEX: 28.41 KG/M2 | WEIGHT: 176 LBS | OXYGEN SATURATION: 98 % | SYSTOLIC BLOOD PRESSURE: 122 MMHG | HEART RATE: 84 BPM

## 2021-10-21 DIAGNOSIS — J01.90 ACUTE BACTERIAL SINUSITIS: ICD-10-CM

## 2021-10-21 DIAGNOSIS — R74.8 ELEVATED LIVER ENZYMES: ICD-10-CM

## 2021-10-21 DIAGNOSIS — G43.811 OTHER MIGRAINE WITH STATUS MIGRAINOSUS, INTRACTABLE: Primary | ICD-10-CM

## 2021-10-21 DIAGNOSIS — E66.09 CLASS 1 OBESITY DUE TO EXCESS CALORIES WITHOUT SERIOUS COMORBIDITY WITH BODY MASS INDEX (BMI) OF 30.0 TO 30.9 IN ADULT: ICD-10-CM

## 2021-10-21 DIAGNOSIS — I80.9 SUPERFICIAL PHLEBITIS: ICD-10-CM

## 2021-10-21 DIAGNOSIS — D49.7 PITUITARY NEOPLASM: ICD-10-CM

## 2021-10-21 DIAGNOSIS — B96.89 ACUTE BACTERIAL SINUSITIS: ICD-10-CM

## 2021-10-21 DIAGNOSIS — Z00.00 HEALTH MAINTENANCE EXAMINATION: ICD-10-CM

## 2021-10-21 PROCEDURE — 99214 OFFICE O/P EST MOD 30 MIN: CPT | Performed by: FAMILY MEDICINE

## 2021-10-21 RX ORDER — GABAPENTIN 100 MG/1
100 CAPSULE ORAL NIGHTLY
Qty: 30 CAPSULE | Refills: 1 | Status: SHIPPED
Start: 2021-10-21 | End: 2022-02-01 | Stop reason: ALTCHOICE

## 2021-10-21 NOTE — PROGRESS NOTES
Mary Cushing : 1970 Sex: female  Age: 45 y.o. Chief Complaint   Patient presents with    Migraine         HPI:      Patient presents today for follow-up on express care after experiencing a reaction to Topamax. Developed nausea vomiting sweats and subsequently symptoms consistent with a migraine. She did have a CTA 2021 showing no acute intracranial abnormality. In [facility] she was given a shot of methylprednisolone Toradol Benadryl and Phenergan. She states her migraines are now \"functional\". She has been working. She did get an appointment with her neurologist at [facility] in 1 week from now. She still notes some generalized malaise but no other specific symptoms including fever or chills. Gets chronic hot flashes. No chest pain palpitations. No further nausea vomiting change in bowels numbness tingling focal weakness or speech facial droop or otherwise. She did take the prednisone taper, has not tried the Imitrex, did not take the cefdinir. Sinus symptoms resolved.             CBC  Lab Results   Component Value Date    WBC 6.9 10/22/2021    WBC 7.5 2021    WBC 5.6 10/29/2020    RBC 4.97 10/22/2021    RBC 4.83 2021    RBC 4.94 10/29/2020    HGB 14.0 10/22/2021    HGB 14.0 2021    HGB 14.5 10/29/2020    HCT 43.2 10/22/2021    HCT 43.8 2021    HCT 44.6 10/29/2020    MCV 86.9 10/22/2021    MCV 90.7 2021    MCV 90.3 10/29/2020     10/22/2021     2021     10/29/2020      CMP  Lab Results   Component Value Date     10/22/2021     2021     10/29/2020    K 4.4 10/22/2021    K 3.2 2021    K 4.4 10/29/2020    K 3.4 2020     10/22/2021     2021     10/29/2020    CO2 23 10/22/2021    CO2 29 2021    CO2 27 10/29/2020    ANIONGAP 12 10/22/2021    ANIONGAP 9 2021    ANIONGAP 10 10/29/2020    GLUCOSE 89 10/22/2021    GLUCOSE 94 2021    GLUCOSE 88 10/29/2020    BUN 16 10/22/2021    BUN 17 09/26/2021    BUN 13 10/29/2020    CREATININE 0.7 10/22/2021    CREATININE 0.6 09/26/2021    CREATININE 0.7 10/29/2020    LABGLOM >60 10/22/2021    LABGLOM >60 09/26/2021    LABGLOM >60 10/29/2020    GFRAA >60 10/22/2021    GFRAA >60 09/26/2021    GFRAA >60 10/29/2020    CALCIUM 9.3 10/22/2021    CALCIUM 9.4 09/26/2021    CALCIUM 9.3 10/29/2020    PROT 6.5 10/22/2021    PROT 6.5 10/29/2020    PROT 6.7 02/19/2020    LABALBU 4.1 10/22/2021    LABALBU 4.1 10/29/2020    LABALBU 4.5 02/19/2020    BILITOT 0.9 10/22/2021    BILITOT 0.5 10/29/2020    BILITOT 0.5 02/19/2020    ALKPHOS 110 10/22/2021    ALKPHOS 102 10/29/2020    ALKPHOS 90 02/19/2020    AST 13 10/22/2021    AST 14 10/29/2020     02/19/2020    ALT 9 10/22/2021    ALT 11 10/29/2020    ALT 75 02/19/2020     A1C  No results found for: LABA1C  TSH  Lab Results   Component Value Date    TSH 0.805 10/22/2021    TSH 1.430 10/29/2020    TSH 1.600 08/19/2019     FREET4  No results found for: F7GSHAW  LIPID  Lab Results   Component Value Date    CHOL 169 10/22/2021    CHOL 165 10/29/2020    CHOL 176 08/19/2019    HDL 64 10/22/2021    HDL 74 10/29/2020    HDL 77 08/19/2019    LDLCALC 90 10/22/2021    LDLCALC 67 10/29/2020    LDLCALC 85 08/19/2019    TRIG 75 10/22/2021    TRIG 121 10/29/2020    TRIG 71 08/19/2019     VITAMIN D  No results found for: VITD25  MAGNESIUM  Lab Results   Component Value Date    MG 2.2 09/26/2021      PHOS  No results found for: PHOS   JAZMYNE   Lab Results   Component Value Date    JAZMYNE NEGATIVE 08/19/2019     RHEUMATOID FACTOR  Lab Results   Component Value Date    RF 10 08/19/2019     PSA  No results found for: PSA   HEPATITIS C  Lab Results   Component Value Date    HCVABI Non-Reactive 10/29/2020     HIV  No results found for: QVE2VZA, HIV1QT  UA  Lab Results   Component Value Date    COLORU Yellow 10/22/2021    COLORU Yellow 10/29/2020    COLORU Yellow 02/19/2020    COLORU yellow 02/19/2020    CLARITYU Clear 10/22/2021    CLARITYU Clear 10/29/2020    CLARITYU Clear 02/19/2020    CLARITYU clean 02/19/2020    GLUCOSEU Negative 10/22/2021    GLUCOSEU Negative 10/29/2020    GLUCOSEU Negative 02/19/2020    GLUCOSEU neg 02/19/2020    BILIRUBINUR Negative 10/22/2021    BILIRUBINUR Negative 10/29/2020    BILIRUBINUR Negative 02/19/2020    BILIRUBINUR neg 02/19/2020    BILIRUBINUR neg 12/28/2019    BILIRUBINUR neg 07/19/2019    KETUA Negative 10/22/2021    KETUA Negative 10/29/2020    KETUA Negative 02/19/2020    KETUA 15mg 02/19/2020    SPECGRAV 1.025 10/22/2021    SPECGRAV 1.020 10/29/2020    SPECGRAV <=1.005 02/19/2020    SPECGRAV 1.030 02/19/2020    BLOODU SMALL 10/22/2021    BLOODU TRACE-INTACT 10/29/2020    BLOODU TRACE-INTACT 02/19/2020    BLOODU moderate 02/19/2020    PHUR 6.5 10/22/2021    PHUR 8.0 10/29/2020    PHUR 6.5 02/19/2020    PHUR 5.5 02/19/2020    PROTEINU Negative 10/22/2021    PROTEINU Negative 10/29/2020    PROTEINU Negative 02/19/2020    PROTEINU neg 02/19/2020    UROBILINOGEN 0.2 10/22/2021    UROBILINOGEN 0.2 10/29/2020    UROBILINOGEN 0.2 02/19/2020    NITRU Negative 10/22/2021    NITRU Negative 10/29/2020    NITRU Negative 02/19/2020    LEUKOCYTESUR Negative 10/22/2021    LEUKOCYTESUR Negative 10/29/2020    LEUKOCYTESUR Negative 02/19/2020    LEUKOCYTESUR small 02/19/2020     Urine Micro/Albumin Ratio  No results found for: MALBCR            ROS:  Const: Denies chills, fever, malaise and sweats. Eyes: Denies discharge, pain, redness, blurred vision from certain headaches  ENMT: Denies earaches, other ear symptoms. Denies nasal or sinus symptoms other than stated  above. Denies mouth and tongue lesions and sore throat. CV: Denies chest discomfort, pain; diaphoresis, dizziness, edema, lightheadedness, orthopnea,  palpitations, syncope and near syncopal episode or any exertional symptoms  Resp: Denies cough, hemoptysis, pleuritic pain, SOB, sputum production and wheezing.   GI: Denies abdominal pain, Procedure Laterality Date    ANKLE SURGERY      APPENDECTOMY      CHOLECYSTECTOMY      CYSTOSCOPY      HERNIA REPAIR      LAPAROSCOPY      multiple for endometriosis    TONSILLECTOMY       Family History   Problem Relation Age of Onset    Heart Disease Mother     Thyroid Disease Mother     Other Mother         kidney problems    Heart Disease Father     Allergy (Severe) Father         seasonal    Other Maternal Grandmother         brain tumor    Other Maternal Aunt         aneurysm    Cancer Paternal Cousin         ovarian      Social History     Tobacco Use    Smoking status: Never Smoker    Smokeless tobacco: Never Used   Vaping Use    Vaping Use: Never used   Substance Use Topics    Alcohol use: No    Drug use: No      Social History     Social History Narrative    PMH:    Medical Problems:    Endometriosis, Raynauds, Migraine    Surgical Hx:    Appendectomy, Removal of Gallbladder    Laparoscopy - Endometriosis    Hernia Repair - Hiatal    Lt ankle -- several surgeries, cartilage removal    Reviewed, no changes. FH:    Father:    Seasonal Allergies, Heart Disease. Mother:    Heart Disease. paternal cousin ovarian cancer in 42's;    maternal aunt berry aneurysm    maternal grandmother brain tumor    Reviewed, no changes. SH:    Marital: Single. Work Status: Full-Time Employment - - [employer]. Personal Habits: Cigarette Use: Nonsmoker. Alcohol: Never used alcohol. Vitals:    10/21/21 1642   BP: 122/60   Pulse: 84   Temp: 97.6 °F (36.4 °C)   SpO2: 98%   Weight: 176 lb (79.8 kg)      Wt Readings from Last 3 Encounters:   10/21/21 176 lb (79.8 kg)   10/19/21 176 lb (79.8 kg)   10/08/21 178 lb (80.7 kg)          Exam:  Const: Appears comfortable no distress skin dry and warm. Head/Face: Atraumatic on inspection. Eyes: EOMI in both eyes. No discharge from the eyes. PERRL. Sclerae clear. ENMT: Auditory canals normal. Tympanic membranes: intact and translucent.  External nose WNL. Nasal mucosa is clear. Oropharynx: No erythema or exudate. Posterior pharynx is normal.  Neck: Supple. Palpation reveals no adenopathy. No masses appreciated. No JVD. Carotids: no  bruits. Resp: Respirations are unlabored. Clear to auscultation. No rales, rhonchi or wheezes appreciated  over the lungs bilaterally. CV: Rate is regular. Rhythm is regular. No gallop or rubs. No heart murmur appreciated. Extremities: No clubbing, cyanosis, or edema. No calf inflammation or tenderness. Exam overall negative. Varicose veins noted but not inflamed in my opinion  Abdomen: Bowel sounds are normoactive. Abdomen is soft, nontender, and nondistended. No  abdominal masses. No palpable hepatosplenomegaly. Lymph: No palpable or visible regional lymphadenopathy. Musculoskeletal: no acute joint inflammation. Knee exam overall unremarkable at this time. Skin: Dry and warm with no rash. Skin normal to inspection and palpation overall. Neuro: Alert and oriented. Affect: appropriate. Upper Extremities: 5/5 bilaterally. Lower Extremities:  5/5 bilaterally. Sensation intact to light touch. Reflexes: DTR's are symmetric and 2+ bilaterally. .  Cranial Nerves: Cranial nerves grossly intact. PDMP Monitoring:    Last PDMP [employer] as Reviewed [facility]):  Review User Review Instant Review Result   Kelly Bonds 10/21/2021  5:12 PM Reviewed PDMP [1]     Last Controlled Substance Monitoring Documentation      Office Visit from 10/21/2021 in [facility]   Periodic Controlled Substance Monitoring  Possible medication side effects, risk of tolerance/dependence & alternative treatments discussed., No signs of potential drug abuse or diversion identified. , Assessed functional status. filed at 10/21/2021 1712        Urine Drug Screenings (1 yr)    No resulted procedures found.        Medication Contract and Consent for Opioid Use Documents Filed      No documents found                Assessment and Plan: Diagnosis Orders   1. Other migraine with status migrainosus, intractable  gabapentin (NEURONTIN) 100 MG capsule   2. Pituitary neoplasm     3. Superficial phlebitis     4. Acute bacterial sinusitis     5. Health maintenance examination     6. Class 1 obesity due to excess calories without serious comorbidity with body mass index (BMI) of 30.0 to 30.9 in adult     7. Elevated liver enzymes       Headache  Counseled extensively. Differential reviewed, including serious etiologies. Although migraine likely other differential reviewed. Wants to proceed as below. Migraine  Counseled extensively. Differential reviewed, including serious etiologies.  She has an appointment with her neurologist at [facility] in 1 week. She had a significant adverse reaction to Topamax and no longer taking. She is status post prednisone taper in express care had methylprednisolone, Toradol, Benadryl and Phenergan shot with good results. She was prescribed Imitrex last time with standard precautions including interactions, serotonin syndrome, vasoconstrictive properties. She has not tried it yet. Appropriate way of taking reviewed. Nose runs after migraine. Discussed vasomotor rhinitis. After discussion shared decision making she is willing to try low-dose gabapentin 100 mg nightly. OARRS report reviewed.  Recommended avoidance of triggers . Counseled on analgesic rebound ha's. higher risk of cva reviewed. signs and symptoms to watch for discussed. Serious signs and symptoms  reviewed. ER if any. Addendum: Would like follow-up MRI, ordered    . Pituitary neoplasm  Counseled extensively. Differential reviewed, including serious etiologies. Blood  work negative in past.  Prolactin 10/20 was negative. Await repeat blood work. Willing to have repeat MRI defers seeing endocrinology or neurosurgery.  Should follow-up neurology. MRI 3/16 again.  Summary was     Again I recommended she consider seeing at least an endocrinologist from Benadryl type products in the past as well. RX Monitoring 10/21/2021   Periodic Controlled Substance Monitoring Possible medication side effects, risk of tolerance/dependence & alternative treatments discussed. ;No signs of potential drug abuse or diversion identified. ;Assessed functional status. Plan as above. Counseled extensively and differential diagnoses relevant to above were reviewed, including serious etiologies. Side effects and interactions of medications were reviewed. Extensive conversation/counseling. My concerns reviewed. Add low-dose gabapentin. Counseled on use of Imitrex. As long as symptoms steadily improve/resolve follow-up 1 week sooner as needed. As long as symptoms steadily improve/resolve, and medical conditions follow the expected course, FU as below, sooner PRN. Return in about 1 week (around 10/28/2021), or if symptoms worsen or fail to improve, for video later in day or 8 days (Friday). Despite discussion, pt declines ER or other evaluation or treatment other than above. If they  change their mind, symptoms persist or worsen, or other serious signs or sympoms, they are to  go to ER immediately as instructed. They understand my concerns and accept the risk of not  complying including sudden debillitating/fatal event and risk of untreatable condition if not  properly dx/tx early. If they continue to decline ER, but change mind on further evaluation or  treatment, notify immediately. Otherwise at least follow up as above, sooner prn. Kevin Bernal MD    Patients are advised to check with insurance company to ensure coverage and to fully understand benefits and cost prior to any testing. This note was created with the assistance of voice recognition software. Document was reviewed however may contain grammatical errors.

## 2021-10-22 DIAGNOSIS — D49.7 PITUITARY NEOPLASM: ICD-10-CM

## 2021-10-22 DIAGNOSIS — Z00.00 HEALTH MAINTENANCE EXAMINATION: ICD-10-CM

## 2021-10-22 DIAGNOSIS — J01.90 ACUTE BACTERIAL SINUSITIS: ICD-10-CM

## 2021-10-22 DIAGNOSIS — E87.6 HYPOKALEMIA: ICD-10-CM

## 2021-10-22 DIAGNOSIS — B96.89 ACUTE BACTERIAL SINUSITIS: ICD-10-CM

## 2021-10-22 LAB
ALBUMIN SERPL-MCNC: 4.1 G/DL (ref 3.5–5.2)
ALP BLD-CCNC: 110 U/L (ref 35–104)
ALT SERPL-CCNC: 9 U/L (ref 0–32)
ANION GAP SERPL CALCULATED.3IONS-SCNC: 12 MMOL/L (ref 7–16)
AST SERPL-CCNC: 13 U/L (ref 0–31)
BACTERIA: NORMAL /HPF
BASOPHILS ABSOLUTE: 0.07 E9/L (ref 0–0.2)
BASOPHILS RELATIVE PERCENT: 1 % (ref 0–2)
BILIRUB SERPL-MCNC: 0.9 MG/DL (ref 0–1.2)
BILIRUBIN URINE: NEGATIVE
BLOOD, URINE: ABNORMAL
BUN BLDV-MCNC: 16 MG/DL (ref 6–20)
CALCIUM SERPL-MCNC: 9.3 MG/DL (ref 8.6–10.2)
CHLORIDE BLD-SCNC: 106 MMOL/L (ref 98–107)
CHOLESTEROL, TOTAL: 169 MG/DL (ref 0–199)
CLARITY: CLEAR
CO2: 23 MMOL/L (ref 22–29)
COLOR: YELLOW
CREAT SERPL-MCNC: 0.7 MG/DL (ref 0.5–1)
EOSINOPHILS ABSOLUTE: 0.24 E9/L (ref 0.05–0.5)
EOSINOPHILS RELATIVE PERCENT: 3.5 % (ref 0–6)
GFR AFRICAN AMERICAN: >60
GFR NON-AFRICAN AMERICAN: >60 ML/MIN/1.73
GLUCOSE BLD-MCNC: 89 MG/DL (ref 74–99)
GLUCOSE URINE: NEGATIVE MG/DL
HCT VFR BLD CALC: 43.2 % (ref 34–48)
HDLC SERPL-MCNC: 64 MG/DL
HEMOGLOBIN: 14 G/DL (ref 11.5–15.5)
IMMATURE GRANULOCYTES #: 0.02 E9/L
IMMATURE GRANULOCYTES %: 0.3 % (ref 0–5)
KETONES, URINE: NEGATIVE MG/DL
LDL CHOLESTEROL CALCULATED: 90 MG/DL (ref 0–99)
LEUKOCYTE ESTERASE, URINE: NEGATIVE
LYMPHOCYTES ABSOLUTE: 2.39 E9/L (ref 1.5–4)
LYMPHOCYTES RELATIVE PERCENT: 34.5 % (ref 20–42)
MCH RBC QN AUTO: 28.2 PG (ref 26–35)
MCHC RBC AUTO-ENTMCNC: 32.4 % (ref 32–34.5)
MCV RBC AUTO: 86.9 FL (ref 80–99.9)
MONOCYTES ABSOLUTE: 0.5 E9/L (ref 0.1–0.95)
MONOCYTES RELATIVE PERCENT: 7.2 % (ref 2–12)
NEUTROPHILS ABSOLUTE: 3.71 E9/L (ref 1.8–7.3)
NEUTROPHILS RELATIVE PERCENT: 53.5 % (ref 43–80)
NITRITE, URINE: NEGATIVE
PDW BLD-RTO: 13.1 FL (ref 11.5–15)
PH UA: 6.5 (ref 5–9)
PLATELET # BLD: 237 E9/L (ref 130–450)
PMV BLD AUTO: 9.6 FL (ref 7–12)
POTASSIUM SERPL-SCNC: 4.4 MMOL/L (ref 3.5–5)
PROLACTIN: 8.23 NG/ML
PROTEIN UA: NEGATIVE MG/DL
RBC # BLD: 4.97 E12/L (ref 3.5–5.5)
RBC UA: NORMAL /HPF (ref 0–2)
SODIUM BLD-SCNC: 141 MMOL/L (ref 132–146)
SPECIFIC GRAVITY UA: 1.02 (ref 1–1.03)
T4 FREE: 1.22 NG/DL (ref 0.93–1.7)
TOTAL PROTEIN: 6.5 G/DL (ref 6.4–8.3)
TRIGL SERPL-MCNC: 75 MG/DL (ref 0–149)
TSH SERPL DL<=0.05 MIU/L-ACNC: 0.81 UIU/ML (ref 0.27–4.2)
UROBILINOGEN, URINE: 0.2 E.U./DL
VLDLC SERPL CALC-MCNC: 15 MG/DL
WBC # BLD: 6.9 E9/L (ref 4.5–11.5)
WBC UA: NORMAL /HPF (ref 0–5)

## 2021-10-25 ENCOUNTER — TELEPHONE (OUTPATIENT)
Dept: PRIMARY CARE CLINIC | Age: 51
End: 2021-10-25

## 2021-10-25 DIAGNOSIS — G43.811 OTHER MIGRAINE WITH STATUS MIGRAINOSUS, INTRACTABLE: ICD-10-CM

## 2021-10-25 DIAGNOSIS — G89.29 CHRONIC INTRACTABLE HEADACHE, UNSPECIFIED HEADACHE TYPE: Primary | ICD-10-CM

## 2021-10-25 DIAGNOSIS — D49.7 PITUITARY NEOPLASM: ICD-10-CM

## 2021-10-25 DIAGNOSIS — R51.9 CHRONIC INTRACTABLE HEADACHE, UNSPECIFIED HEADACHE TYPE: Primary | ICD-10-CM

## 2021-10-25 NOTE — TELEPHONE ENCOUNTER
Patient left voicemail about mri      Returned call, no answer and unable to leave message due to vm being full

## 2021-10-25 NOTE — TELEPHONE ENCOUNTER
Patient calling she had been waiting to hear from scheduling dept for mri she was under the impression an mri of head due to pituitary cyst/headaches was ordered per conversation in office at her appt 10/8

## 2021-11-17 ENCOUNTER — HOSPITAL ENCOUNTER (OUTPATIENT)
Dept: MRI IMAGING | Age: 51
Discharge: HOME | End: 2021-11-19
Payer: COMMERCIAL

## 2021-11-17 DIAGNOSIS — G43.811 OTHER MIGRAINE WITH STATUS MIGRAINOSUS, INTRACTABLE: ICD-10-CM

## 2021-11-17 DIAGNOSIS — R51.9 CHRONIC INTRACTABLE HEADACHE, UNSPECIFIED HEADACHE TYPE: ICD-10-CM

## 2021-11-17 DIAGNOSIS — G89.29 CHRONIC INTRACTABLE HEADACHE, UNSPECIFIED HEADACHE TYPE: ICD-10-CM

## 2021-11-17 DIAGNOSIS — D49.7 PITUITARY NEOPLASM: ICD-10-CM

## 2021-11-17 PROCEDURE — A9579 GAD-BASE MR CONTRAST NOS,1ML: HCPCS | Performed by: RADIOLOGY

## 2021-11-17 PROCEDURE — 70553 MRI BRAIN STEM W/O & W/DYE: CPT

## 2021-11-17 PROCEDURE — 6360000004 HC RX CONTRAST MEDICATION: Performed by: RADIOLOGY

## 2021-11-17 RX ADMIN — GADOTERIDOL 16 ML: 279.3 INJECTION, SOLUTION INTRAVENOUS at 09:01

## 2021-11-22 ENCOUNTER — OFFICE VISIT (OUTPATIENT)
Dept: PRIMARY CARE CLINIC | Age: 51
End: 2021-11-22
Payer: COMMERCIAL

## 2021-11-22 VITALS
SYSTOLIC BLOOD PRESSURE: 130 MMHG | OXYGEN SATURATION: 97 % | DIASTOLIC BLOOD PRESSURE: 68 MMHG | TEMPERATURE: 97.4 F | HEART RATE: 68 BPM

## 2021-11-22 DIAGNOSIS — I80.9 SUPERFICIAL PHLEBITIS: ICD-10-CM

## 2021-11-22 DIAGNOSIS — D49.7 PITUITARY NEOPLASM: ICD-10-CM

## 2021-11-22 DIAGNOSIS — R31.9 HEMATURIA, UNSPECIFIED TYPE: ICD-10-CM

## 2021-11-22 DIAGNOSIS — R74.8 ELEVATED LIVER ENZYMES: ICD-10-CM

## 2021-11-22 DIAGNOSIS — Z00.00 HEALTH MAINTENANCE EXAMINATION: ICD-10-CM

## 2021-11-22 DIAGNOSIS — G43.811 OTHER MIGRAINE WITH STATUS MIGRAINOSUS, INTRACTABLE: Primary | ICD-10-CM

## 2021-11-22 PROCEDURE — 99214 OFFICE O/P EST MOD 30 MIN: CPT | Performed by: FAMILY MEDICINE

## 2021-11-22 NOTE — PROGRESS NOTES
David Flores : 1970 Sex: female  Age: 45 y.o. Chief Complaint   Patient presents with    Other     review MRI          HPI:      Patient presents today for follow-up. States migraines have resolved with the colder weather. States ice cream helps alleviate migraines when she gets them. States they were intractable up until this week but now resolved. Neurology consult got pushed off because they realized had been so long since she was seen in the canceled her virtual visit, has appointment . She did have a CTA 2021 showing no acute intracranial abnormality. No symptoms now. Had MRI    Brain  showing \"tiny 3 mm nonenhancing nodule in the left side of the pituitary, slightly decreased compared to 3/8/2016 that could represent a cyst or nonenhancing microadenoma, otherwise unremarkable\". She read the side effects of gabapentin, decided not to take it. Has not tried Imitrex yet.     Blood work  Reviewed, CMP normal except alkaline phosphatase 110 and she simply wants yearly monitoring sooner if symptoms, prolactin 8.23 TFTs normal CBC with differential normal urinalysis chronic microscopic hematuria with 0-1 RBC, negative reevaluate in the past, HDL 64 LDL 90 triglycerides 75    CBC  Lab Results   Component Value Date    WBC 6.9 10/22/2021    WBC 7.5 2021    WBC 5.6 10/29/2020    RBC 4.97 10/22/2021    RBC 4.83 2021    RBC 4.94 10/29/2020    HGB 14.0 10/22/2021    HGB 14.0 2021    HGB 14.5 10/29/2020    HCT 43.2 10/22/2021    HCT 43.8 2021    HCT 44.6 10/29/2020    MCV 86.9 10/22/2021    MCV 90.7 2021    MCV 90.3 10/29/2020     10/22/2021     2021     10/29/2020      CMP  Lab Results   Component Value Date     10/22/2021     2021     10/29/2020    K 4.4 10/22/2021    K 3.2 2021    K 4.4 10/29/2020    K 3.4 2020     10/22/2021     2021     10/29/2020    CO2 23 10/22/2021    CO2 29 09/26/2021    CO2 27 10/29/2020    ANIONGAP 12 10/22/2021    ANIONGAP 9 09/26/2021    ANIONGAP 10 10/29/2020    GLUCOSE 89 10/22/2021    GLUCOSE 94 09/26/2021    GLUCOSE 88 10/29/2020    BUN 16 10/22/2021    BUN 17 09/26/2021    BUN 13 10/29/2020    CREATININE 0.7 10/22/2021    CREATININE 0.6 09/26/2021    CREATININE 0.7 10/29/2020    LABGLOM >60 10/22/2021    LABGLOM >60 09/26/2021    LABGLOM >60 10/29/2020    GFRAA >60 10/22/2021    GFRAA >60 09/26/2021    GFRAA >60 10/29/2020    CALCIUM 9.3 10/22/2021    CALCIUM 9.4 09/26/2021    CALCIUM 9.3 10/29/2020    PROT 6.5 10/22/2021    PROT 6.5 10/29/2020    PROT 6.7 02/19/2020    LABALBU 4.1 10/22/2021    LABALBU 4.1 10/29/2020    LABALBU 4.5 02/19/2020    BILITOT 0.9 10/22/2021    BILITOT 0.5 10/29/2020    BILITOT 0.5 02/19/2020    ALKPHOS 110 10/22/2021    ALKPHOS 102 10/29/2020    ALKPHOS 90 02/19/2020    AST 13 10/22/2021    AST 14 10/29/2020     02/19/2020    ALT 9 10/22/2021    ALT 11 10/29/2020    ALT 75 02/19/2020     A1C  No results found for: LABA1C  TSH  Lab Results   Component Value Date    TSH 0.805 10/22/2021    TSH 1.430 10/29/2020    TSH 1.600 08/19/2019     FREET4  No results found for: I6YBRHO  LIPID  Lab Results   Component Value Date    CHOL 169 10/22/2021    CHOL 165 10/29/2020    CHOL 176 08/19/2019    HDL 64 10/22/2021    HDL 74 10/29/2020    HDL 77 08/19/2019    LDLCALC 90 10/22/2021    LDLCALC 67 10/29/2020    LDLCALC 85 08/19/2019    TRIG 75 10/22/2021    TRIG 121 10/29/2020    TRIG 71 08/19/2019     VITAMIN D  No results found for: VITD25  MAGNESIUM  Lab Results   Component Value Date    MG 2.2 09/26/2021      PHOS  No results found for: PHOS   JAZMYNE   Lab Results   Component Value Date    JAZMYNE NEGATIVE 08/19/2019     RHEUMATOID FACTOR  Lab Results   Component Value Date    RF 10 08/19/2019     PSA  No results found for: PSA   HEPATITIS C  Lab Results   Component Value Date    HCVABI Non-Reactive 10/29/2020     HIV  No results found for: WZL5FGJ, HIV1QT  UA  Lab Results   Component Value Date    COLORU Yellow 10/22/2021    COLORU Yellow 10/29/2020    COLORU Yellow 02/19/2020    COLORU yellow 02/19/2020    CLARITYU Clear 10/22/2021    CLARITYU Clear 10/29/2020    CLARITYU Clear 02/19/2020    CLARITYU clean 02/19/2020    GLUCOSEU Negative 10/22/2021    GLUCOSEU Negative 10/29/2020    GLUCOSEU Negative 02/19/2020    GLUCOSEU neg 02/19/2020    BILIRUBINUR Negative 10/22/2021    BILIRUBINUR Negative 10/29/2020    BILIRUBINUR Negative 02/19/2020    BILIRUBINUR neg 02/19/2020    BILIRUBINUR neg 12/28/2019    BILIRUBINUR neg 07/19/2019    KETUA Negative 10/22/2021    KETUA Negative 10/29/2020    KETUA Negative 02/19/2020    KETUA 15mg 02/19/2020    SPECGRAV 1.025 10/22/2021    SPECGRAV 1.020 10/29/2020    SPECGRAV <=1.005 02/19/2020    SPECGRAV 1.030 02/19/2020    BLOODU SMALL 10/22/2021    BLOODU TRACE-INTACT 10/29/2020    BLOODU TRACE-INTACT 02/19/2020    BLOODU moderate 02/19/2020    PHUR 6.5 10/22/2021    PHUR 8.0 10/29/2020    PHUR 6.5 02/19/2020    PHUR 5.5 02/19/2020    PROTEINU Negative 10/22/2021    PROTEINU Negative 10/29/2020    PROTEINU Negative 02/19/2020    PROTEINU neg 02/19/2020    UROBILINOGEN 0.2 10/22/2021    UROBILINOGEN 0.2 10/29/2020    UROBILINOGEN 0.2 02/19/2020    NITRU Negative 10/22/2021    NITRU Negative 10/29/2020    NITRU Negative 02/19/2020    LEUKOCYTESUR Negative 10/22/2021    LEUKOCYTESUR Negative 10/29/2020    LEUKOCYTESUR Negative 02/19/2020    LEUKOCYTESUR small 02/19/2020     Urine Micro/Albumin Ratio  No results found for: MALBCR            ROS:  Const: Denies chills, fever, malaise and sweats. Eyes: Denies discharge, pain, redness, blurred vision from certain headaches, since resolved  ENMT: Denies earaches, other ear symptoms. Denies nasal or sinus symptoms other than stated  above. Denies mouth and tongue lesions and sore throat.   CV: Denies chest discomfort, pain; diaphoresis, dizziness, edema, lightheadedness, orthopnea,  palpitations, syncope and near syncopal episode or any exertional symptoms  Resp: Denies cough, hemoptysis, pleuritic pain, SOB, sputum production and wheezing. GI: Denies abdominal pain, change in bowel habits, hematochezia, melena,  and vomiting. Had nausea with headache, both resolved  : Denies urinary symptoms including dysuria , urgency, frequency or hematuria. Musculo: Denies musculoskeletal symptoms  Skin: Denies bruising and rash. Neuro: Denies  numbness, stiff neck, tingling and focal weakness slurred speech or facial  droop, intermittent headaches as above, not recently  Hema/Lymph: Denies bleeding/bruising tendency and enlarged lymph nodes        Current Outpatient Medications:     gabapentin (NEURONTIN) 100 MG capsule, Take 1 capsule by mouth nightly for 30 days. , Disp: 30 capsule, Rfl: 1    SUMAtriptan (IMITREX) 50 MG tablet, 1/2-1 po at onset of migraine PRN, may repeat x 1 in 2hrs, Disp: 9 tablet, Rfl: 3    albuterol sulfate  (90 Base) MCG/ACT inhaler, Inhale 2 puffs into the lungs 4 times daily as needed for Wheezing, Disp: 1 Inhaler, Rfl: 0    aspirin 81 MG EC tablet, Take 81 mg by mouth daily, Disp: , Rfl:   Allergies   Allergen Reactions    Morphine      Reaction is reddened skin and patient \"blacks out\"    Oxycodone Anxiety    Tomato Anaphylaxis    Alprazolam      Reaction is hallucinations  hallucinations    Hydrocodone-Acetaminophen      GI upset    Influenza Vaccines      ?  Neuro issue and neurologist said not to get again    Oseltamivir     Oxycontin [Oxycodone Hcl]      Reaction is reddened skin and patient \"blacks out\"    Tamiflu [Oseltamivir Phosphate]      Reaction is hallucinations    Topiramate      Weakness, memory loss, nausea/emesis, sweats    Adhesive Tape Rash     Reaction is sloughing of skin  Skin comes off    Oseltamivir Phosphate Nausea And Vomiting       Past Medical History:   Diagnosis Date    Blood in urine history of    Constipation     Endometriosis     H/O superficial phlebitis 4/29/2021    Migraines     Raynaud's disease     Superficial phlebitis of leg, right 9/30/2020    Varicose veins of leg with pain, right 9/30/2020     Past Surgical History:   Procedure Laterality Date    ANKLE SURGERY      APPENDECTOMY      CHOLECYSTECTOMY      CYSTOSCOPY      HERNIA REPAIR      LAPAROSCOPY      multiple for endometriosis    TONSILLECTOMY       Family History   Problem Relation Age of Onset    Heart Disease Mother     Thyroid Disease Mother     Other Mother         kidney problems    Heart Disease Father     Allergy (Severe) Father         seasonal    Other Maternal Grandmother         brain tumor    Other Maternal Aunt         aneurysm    Cancer Paternal Cousin         ovarian      Social History     Tobacco Use    Smoking status: Never Smoker    Smokeless tobacco: Never Used   Vaping Use    Vaping Use: Never used   Substance Use Topics    Alcohol use: No    Drug use: No      Social History     Social History Narrative    PMH:    Medical Problems:    Endometriosis, Raynauds, Migraine    Surgical Hx:    Appendectomy, Removal of Gallbladder    Laparoscopy - Endometriosis    Hernia Repair - Hiatal    Lt ankle -- several surgeries, cartilage removal    Reviewed, no changes. FH:    Father:    Seasonal Allergies, Heart Disease. Mother:    Heart Disease. paternal cousin ovarian cancer in 42's;    maternal aunt berry aneurysm    maternal grandmother brain tumor    Reviewed, no changes. SH:    Marital: Single. Work Status: Full-Time Employment - - [employer]. Personal Habits: Cigarette Use: Nonsmoker. Alcohol: Never used alcohol.         Vitals:    11/22/21 1042   BP: 130/68   Pulse: 68   Temp: 97.4 °F (36.3 °C)   SpO2: 97%      Wt Readings from Last 3 Encounters:   10/21/21 176 lb (79.8 kg)   10/19/21 176 lb (79.8 kg)   10/08/21 178 lb (80.7 kg)          Exam:  Const: Appears comfortable no distress skin dry and warm. Head/Face: Atraumatic on inspection. Eyes: EOMI in both eyes. No discharge from the eyes. PERRL. Sclerae clear. ENMT: Auditory canals normal. Tympanic membranes: intact and translucent. External nose WNL. Nasal mucosa is clear. Oropharynx: No erythema or exudate. Posterior pharynx is normal.  Neck: Supple. Palpation reveals no adenopathy. No masses appreciated. No JVD. Carotids: no  bruits. Resp: Respirations are unlabored. Clear to auscultation. No rales, rhonchi or wheezes appreciated  over the lungs bilaterally. CV: Rate is regular. Rhythm is regular. No gallop or rubs. No heart murmur appreciated. Extremities: No clubbing, cyanosis, or edema. No calf inflammation or tenderness. Exam overall negative. Varicose veins noted but not inflamed in my opinion  Abdomen: Bowel sounds are normoactive. Abdomen is soft, nontender, and nondistended. No  abdominal masses. No palpable hepatosplenomegaly. Lymph: No palpable or visible regional lymphadenopathy. Musculoskeletal: no acute joint inflammation. Knee exam overall unremarkable at this time. Skin: Dry and warm with no rash. Skin normal to inspection and palpation overall. Neuro: Alert and oriented. Affect: appropriate. Upper Extremities: 5/5 bilaterally. Lower Extremities:  5/5 bilaterally. Sensation intact to light touch. Reflexes: DTR's are symmetric and 2+ bilaterally. .  Cranial Nerves: Cranial nerves grossly intact. PDMP Monitoring:    PDMP Monitoring:    Last PDMP [place] as Reviewed [facility]):  Review User Review Instant Review Result   Parvizvee WarrenSaint [place] 11/22/2021 12:53 PM Reviewed PDMP [1]     Last Controlled Substance Monitoring Documentation      Office Visit from 11/22/2021 in [facility]   Periodic Controlled Substance Monitoring Possible medication side effects, risk of tolerance/dependence & alternative treatments discussed.,  No signs of potential drug abuse or diversion identified. ,  Assessed functional status. filed at 11/22/2021 1253        Urine Drug Screenings (1 yr)    No resulted procedures found. Medication Contract and Consent for Opioid Use Documents Filed      No documents found                Assessment and Plan:   Diagnosis Orders   1. Other migraine with status migrainosus, intractable     2. Elevated liver enzymes     3. Pituitary neoplasm  PROLACTIN   4. Health maintenance examination  Lipid Panel    TSH without Reflex    Comprehensive Metabolic Panel    CBC Auto Differential    Urinalysis   5. Hematuria, unspecified type  Urinalysis   6. Superficial phlebitis       Headache  Counseled extensively. Differential reviewed, including serious etiologies. Although migraine likely other differential reviewed. Wants to proceed as below. Currently symptom-free    Migraine  Counseled extensively. Differential reviewed, including serious etiologies.  She had an appointment with her neurologist at [facility], they rescheduled because they required an in person evaluation since it had been a while since seen. Has appointment now December 7. She had a significant adverse reaction to Topamax and no longer taking. She is status post prednisone taper in express care had methylprednisolone, Toradol, Benadryl and Phenergan shot with good results. She was prescribed Imitrex last time with standard precautions including interactions, serotonin syndrome, vasoconstrictive properties. She has not tried it yet. Appropriate way of taking reviewed. Nose runs after migraine. Discussed vasomotor rhinitis. Last time we prescribed low-dose gabapentin 100 mg nightly. OARRS report reviewed. After reading the side effects she decided not to take and is asymptomatic now.  Recommended avoidance of triggers . Counseled on analgesic rebound ha's. higher risk of cva reviewed. signs and symptoms to watch for discussed. Serious signs and symptoms  reviewed. ER if any. November 2021 MRI as above  .     Pituitary neoplasm  Counseled extensively. Differential reviewed, including serious etiologies. Blood  work negative, normal prolactin 11/21. MRI 11/21  \"tiny 3 mm nonenhancing nodule in the left side of the pituitary, slightly decreased compared to 3/8/2016 that could represent a cyst or nonenhancing microadenoma, otherwise unremarkable\". defers seeing endocrinology or neurosurgery.  Should follow-up neurology. MRI 3/16 again. Summary was       Hypokalemia    Counseled extensively. Differential reviewed, including serious etiologies. Noted in ER and supplemented in ER. Appropriate intake reviewed. Risk of low and high reviewed. 11/21 normal    Elevated liver enzymes  Counseled extensively. Differential reviewed. I noticed this from blood work drawn in the ER 2/20. Resolved/normalized. Hepatitis A/B/C- as of 11/2020; not immune to hepatitis A/Bdefers vaccine at this time. LFTs normalized, alkaline phosphatase mildly elevated, simply wants checked yearly sooner if symptoms. Superficial phlebitis  Counseled extensively. Differential reviewed, including serious etiologies. Continue per Dr. Sonia Baptiste. Currently on baby aspirin although temporarily held recently. Counseled other treatment options depending on size and location. Counseled on leg elevation, compression stockings compliance, low-salt diet. Counseled on risk benefits of NSAIDs. Restrictions reviewed. Varicose veins of both lower extremities with inflammation  Counseled. Continue per Dr. Sonia Baptiste             Overweight (BMI 28)  Counseled. Risk reviewed. Declines formal intervention. Counseled on weight watchers, Mediterranean diet.     Allergic rhinitis  May try nasal saline, nasal steroid daily, Allegra or equivalent daily as needed.  Counseled on singular     Health maintenance examination  Health maintenance issues discussed at length  10/20. Encouraged yearly physicals. Encouraged shingrix. FIT cards declines.  [facility] cscope dr Dandre Martínez, we will obtain mammogram 10/20 -            Hematuria  Counseled extensively. Differential reviewed, including serious etiologies. States neg parr in past through urologist.  Declines follow-up unless symptoms      Insomnia  Counseled, stable, recommended good sleep hygiene, pros and cons of pharmacotherapy reviewed, side effects ADRs. She is going to consider melatonin as a starting point. Has benefited from Benadryl type products in the past as well. Plan as above. Counseled extensively and differential diagnoses relevant to above were reviewed, including serious etiologies. Side effects and interactions of medications were reviewed. Extensive conversation/counseling. My concerns reviewed. She will consider low-dose gabapentin but deferring this now. Awaiting neurology appointment December 7. She symptoms of follow-up with me to discuss further after the first of the year and will plan physical then, sooner as needed  As long as symptoms steadily improve/resolve, and medical conditions follow the expected course, FU as below, sooner PRN. Return for after first of yr to review ha's/neuro consult (scheduled 12/7), physical.        Vincent Jensen MD    Patients are advised to check with insurance company to ensure coverage and to fully understand benefits and cost prior to any testing. This note was created with the assistance of voice recognition software. Document was reviewed however may contain grammatical errors.

## 2021-12-12 ENCOUNTER — OFFICE VISIT (OUTPATIENT)
Dept: FAMILY MEDICINE CLINIC | Age: 51
End: 2021-12-12
Payer: COMMERCIAL

## 2021-12-12 VITALS
HEIGHT: 66 IN | HEART RATE: 64 BPM | OXYGEN SATURATION: 96 % | BODY MASS INDEX: 28.41 KG/M2 | DIASTOLIC BLOOD PRESSURE: 76 MMHG | TEMPERATURE: 96.9 F | SYSTOLIC BLOOD PRESSURE: 104 MMHG

## 2021-12-12 DIAGNOSIS — T30.0 BURN: Primary | ICD-10-CM

## 2021-12-12 PROCEDURE — 99213 OFFICE O/P EST LOW 20 MIN: CPT | Performed by: STUDENT IN AN ORGANIZED HEALTH CARE EDUCATION/TRAINING PROGRAM

## 2021-12-12 RX ORDER — SULFAMETHOXAZOLE AND TRIMETHOPRIM 800; 160 MG/1; MG/1
1 TABLET ORAL 2 TIMES DAILY
Qty: 14 TABLET | Refills: 0 | Status: SHIPPED | OUTPATIENT
Start: 2021-12-12 | End: 2021-12-19

## 2021-12-12 ASSESSMENT — ENCOUNTER SYMPTOMS
RHINORRHEA: 0
SORE THROAT: 0
EYE REDNESS: 0
COUGH: 0
CONSTIPATION: 0
BACK PAIN: 0
SHORTNESS OF BREATH: 0
SINUS PAIN: 0
NAUSEA: 0
VOMITING: 0
ABDOMINAL PAIN: 0
EYE PAIN: 0
SINUS PRESSURE: 0
DIARRHEA: 0
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
## 2022-02-01 ENCOUNTER — OFFICE VISIT (OUTPATIENT)
Dept: FAMILY MEDICINE CLINIC | Age: 52
End: 2022-02-01
Payer: COMMERCIAL

## 2022-02-01 VITALS
RESPIRATION RATE: 18 BRPM | HEIGHT: 66 IN | TEMPERATURE: 97.4 F | WEIGHT: 178 LBS | SYSTOLIC BLOOD PRESSURE: 114 MMHG | HEART RATE: 70 BPM | OXYGEN SATURATION: 98 % | DIASTOLIC BLOOD PRESSURE: 72 MMHG | BODY MASS INDEX: 28.61 KG/M2

## 2022-02-01 DIAGNOSIS — J01.90 ACUTE NON-RECURRENT SINUSITIS, UNSPECIFIED LOCATION: Primary | ICD-10-CM

## 2022-02-01 PROCEDURE — 99213 OFFICE O/P EST LOW 20 MIN: CPT | Performed by: PHYSICIAN ASSISTANT

## 2022-02-01 RX ORDER — AZITHROMYCIN 250 MG/1
250 TABLET, FILM COATED ORAL SEE ADMIN INSTRUCTIONS
Qty: 6 TABLET | Refills: 0 | Status: SHIPPED | OUTPATIENT
Start: 2022-02-01 | End: 2022-02-06

## 2022-02-01 NOTE — PROGRESS NOTES
Chief Complaint   Sinus Problem (Does not want swabbed for COVID), Congestion, and Epistaxis    History of Present Illness   Source of history provided by: patient. Savanah Cid is a 45 y.o. old female who has a past medical history of:   Past Medical History:   Diagnosis Date    Blood in urine     history of    Constipation     Endometriosis     H/O superficial phlebitis 4/29/2021    Migraines     Raynaud's disease     Superficial phlebitis of leg, right 9/30/2020    Varicose veins of leg with pain, right 9/30/2020    Presents to the walk in clinic for evaluation of sinus pressure, congestion, and nose bleeds x 5 days. Denies any fever, chills, body aches, sore throat, CP, dyspnea, LE edema, abdominal pain, vomiting, rash, or lethargy. Has been taking Claritin without symptomatic relief. Denies COVID exposure. Denies any hx of asthma or COPD. Denies hx of tobacco use. Has had COVID vaccinations. ROS   Pertinent positives and negatives are stated within HPI, all other systems reviewed and are negative. Surgical History:  has a past surgical history that includes Appendectomy; hernia repair; Ankle surgery; Cholecystectomy; Tonsillectomy; laparoscopy; and Cystoscopy. Social History:  reports that she has never smoked. She has never used smokeless tobacco. She reports that she does not drink alcohol and does not use drugs. Family History: family history includes Allergy (Severe) in her father; Cancer in her paternal cousin; Heart Disease in her father and mother; Other in her maternal aunt, maternal grandmother, and mother; Thyroid Disease in her mother.   Allergies: Morphine, Oxycodone, Tomato, Alprazolam, Hydrocodone-acetaminophen, Influenza vaccines, Oseltamivir, Oxycontin [oxycodone hcl], Tamiflu [oseltamivir phosphate], Topiramate, Adhesive tape, and Oseltamivir phosphate    Physical Exam      VS:  /72 (Site: Right Upper Arm, Position: Sitting, Cuff Size: Medium Adult)   Pulse 70 Temp 97.4 °F (36.3 °C) (Temporal)   Resp 18   Ht 5' 6\" (1.676 m)   Wt 178 lb (80.7 kg)   SpO2 98%   BMI 28.73 kg/m²    Oxygen Saturation Interpretation: Normal.    Constitutional:  Alert, development consistent with age. NAD. Head:  NC/NT. Airway patent. Ears: TMs normal bilaterally. Canals without exudate or swelling bilaterally. Mouth: Posterior pharynx with mild erythema and clear postnasal drip. No tonsillar hypertrophy or exudate. Neck:  Normal ROM. Supple. No anterior cervical adenopathy noted. Lungs: CTAB without wheezes, rales, or rhonchi. CV:  Regular rate and rhythm, normal heart sounds, without pathological murmurs, ectopy, gallops, or rubs. Skin:  Normal turgor. Warm, dry, without visible rash. Lymphatic: No lymphangitis or adenopathy noted. Neurological:  Oriented. Motor functions intact. Lab / Imaging Results   (All laboratory and radiology results have been personally reviewed by myself)  Labs:  No results found for this visit on 02/01/22. Imaging: All Radiology results interpreted by Radiologist unless otherwise noted. No results found. Medical Decision Making   Pt non-toxic, in no apparent distress and stable at time of discharge. Assessment/Plan   Fredy Diaz was seen today for sinus problem, congestion and epistaxis. Diagnoses and all orders for this visit:    Acute non-recurrent sinusitis, unspecified location  -     azithromycin (ZITHROMAX) 250 MG tablet; Take 1 tablet by mouth See Admin Instructions for 5 days 500mg on day 1 followed by 250mg on days 2 - 5    Declines COVID testing today. Script written for zpak, side effects discussed. Increase fluids and rest. Symptomatic relief discussed including Tylenol prn pain/fever. Schedule f/u with PCP in 7-10 days if symptoms persist. ED sooner if symptoms worsen or change.  ED immediately with high or refractory fever, progressive SOB, dyspnea, CP, calf pain/swelling, shaking chills, vomiting, abdominal pain, lethargy, flank pain, or decreased urinary output. Pt verbalizes understanding and is in agreement with plan of care. All questions answered.     Cinda Ozuna PA-C

## 2022-03-22 ENCOUNTER — OFFICE VISIT (OUTPATIENT)
Dept: FAMILY MEDICINE CLINIC | Age: 52
End: 2022-03-22
Payer: COMMERCIAL

## 2022-03-22 VITALS
OXYGEN SATURATION: 98 % | DIASTOLIC BLOOD PRESSURE: 74 MMHG | HEIGHT: 66 IN | TEMPERATURE: 98 F | HEART RATE: 71 BPM | WEIGHT: 178 LBS | BODY MASS INDEX: 28.61 KG/M2 | SYSTOLIC BLOOD PRESSURE: 124 MMHG

## 2022-03-22 DIAGNOSIS — S92.512A CLOSED DISPLACED FRACTURE OF PROXIMAL PHALANX OF LESSER TOE OF LEFT FOOT, INITIAL ENCOUNTER: Primary | ICD-10-CM

## 2022-03-22 DIAGNOSIS — M79.672 LEFT FOOT PAIN: ICD-10-CM

## 2022-03-22 PROCEDURE — L3260 AMBULATORY SURGICAL BOOT EAC: HCPCS | Performed by: NURSE PRACTITIONER

## 2022-03-22 PROCEDURE — 99214 OFFICE O/P EST MOD 30 MIN: CPT | Performed by: NURSE PRACTITIONER
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
## 2022-03-30 ENCOUNTER — TELEPHONE (OUTPATIENT)
Dept: PRIMARY CARE CLINIC | Age: 52
End: 2022-03-30

## 2022-03-30 DIAGNOSIS — G43.811 OTHER MIGRAINE WITH STATUS MIGRAINOSUS, INTRACTABLE: ICD-10-CM

## 2022-03-30 RX ORDER — SUMATRIPTAN 50 MG/1
TABLET, FILM COATED ORAL
Qty: 9 TABLET | Refills: 1 | Status: CANCELLED | OUTPATIENT
Start: 2022-03-30

## 2022-03-30 RX ORDER — ONDANSETRON 4 MG/1
4 TABLET, FILM COATED ORAL 3 TIMES DAILY PRN
Qty: 10 TABLET | Refills: 0 | Status: SHIPPED
Start: 2022-03-30 | End: 2022-04-04

## 2022-03-30 NOTE — TELEPHONE ENCOUNTER
If refusing to come in and be assessed, I can call in med. Make sure she is aware best to be seen if having symptoms. Express care at a minimum, er if any serious s/s.  Let me know

## 2022-03-30 NOTE — TELEPHONE ENCOUNTER
Pt has had a migraine since Sunday and asking for RF on Imitrex(pended) and also something for nausea.  Looks like Zofran has been given in past

## 2022-04-12 ENCOUNTER — OFFICE VISIT (OUTPATIENT)
Dept: PODIATRY | Age: 52
End: 2022-04-12
Payer: COMMERCIAL

## 2022-04-12 VITALS
TEMPERATURE: 97.6 F | WEIGHT: 178 LBS | BODY MASS INDEX: 28.73 KG/M2 | DIASTOLIC BLOOD PRESSURE: 64 MMHG | SYSTOLIC BLOOD PRESSURE: 123 MMHG

## 2022-04-12 DIAGNOSIS — S92.902A CLOSED FRACTURE OF LEFT FOOT, INITIAL ENCOUNTER: Primary | ICD-10-CM

## 2022-04-12 PROCEDURE — 99203 OFFICE O/P NEW LOW 30 MIN: CPT | Performed by: PODIATRIST
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
## 2022-04-12 NOTE — PROGRESS NOTES
22  Myranda Backer : 1970 Sex: female  Age: 45 y.o. Patient was referred by Mariia Godinez MD    Chief Complaint   Patient presents with    Foot Injury     referred from [facility] on 3/22 pt had xrays and given post op shoe    Referral - General    Established New Doctor       SUBJECTIVE patient is seen today for a fracture to her left foot. This is approximately 3-1/2weeks old patient fell at home. Patient was placed in a postop shoe. HPI  Review of Systems  Const: Denies constitutional symptoms  Musculo: Denies symptoms other than stated above  Skin: Denies symptoms other than stated above       Current Outpatient Medications:     silver sulfADIAZINE (SILVADENE) 1 % cream, Apply topically daily. , Disp: 400 g, Rfl: 1    SUMAtriptan (IMITREX) 50 MG tablet, 1/2-1 po at onset of migraine PRN, may repeat x 1 in 2hrs, Disp: 9 tablet, Rfl: 3  Allergies   Allergen Reactions    Morphine      Reaction is reddened skin and patient \"blacks out\"    Oxycodone Anxiety    Tomato Anaphylaxis    Alprazolam      Reaction is hallucinations  hallucinations    Hydrocodone-Acetaminophen      GI upset    Influenza Vaccines      ?  Neuro issue and neurologist said not to get again    Oseltamivir     Oxycontin [Oxycodone Hcl]      Reaction is reddened skin and patient \"blacks out\"    Tamiflu [Oseltamivir Phosphate]      Reaction is hallucinations    Topiramate      Weakness, memory loss, nausea/emesis, sweats    Adhesive Tape Rash     Reaction is sloughing of skin  Skin comes off    Oseltamivir Phosphate Nausea And Vomiting       Past Medical History:   Diagnosis Date    Blood in urine     history of    Constipation     Endometriosis     H/O superficial phlebitis 2021    Migraines     Raynaud's disease     Superficial phlebitis of leg, right 2020    Varicose veins of leg with pain, right 2020     Past Surgical History:   Procedure Laterality Date    ANKLE SURGERY      APPENDECTOMY      CHOLECYSTECTOMY      CYSTOSCOPY      HERNIA REPAIR      LAPAROSCOPY      multiple for endometriosis    TONSILLECTOMY       Family History   Problem Relation Age of Onset    Heart Disease Mother     Thyroid Disease Mother     Other Mother         kidney problems    Heart Disease Father     Allergy (Severe) Father         seasonal    Other Maternal Grandmother         brain tumor    Other Maternal Aunt         aneurysm    Cancer Paternal Cousin         ovarian      Social History     Socioeconomic History    Marital status: Single     Spouse name: Not on file    Number of children: Not on file    Years of education: Not on file    Highest education level: Not on file   Occupational History    Not on file   Tobacco Use    Smoking status: Never Smoker    Smokeless tobacco: Never Used   Vaping Use    Vaping Use: Never used   Substance and Sexual Activity    Alcohol use: No    Drug use: No    Sexual activity: Not on file   Other Topics Concern    Not on file   Social History Narrative    PMH:    Medical Problems:    Endometriosis, Raynauds, Migraine    Surgical Hx:    Appendectomy, Removal of Gallbladder    Laparoscopy - Endometriosis    Hernia Repair - Hiatal    Lt ankle -- several surgeries, cartilage removal    Reviewed, no changes. FH:    Father:    Seasonal Allergies, Heart Disease. Mother:    Heart Disease. paternal cousin ovarian cancer in 42's;    maternal aunt berry aneurysm    maternal grandmother brain tumor    Reviewed, no changes. SH:    Marital: Single. Work Status: Full-Time Employment - - [employer]. Personal Habits: Cigarette Use: Nonsmoker. Alcohol: Never used alcohol.      Social Determinants of Health     Financial Resource Strain: Low Risk     Difficulty of Paying Living Expenses: Not hard at all   Food Insecurity: No Food Insecurity    Worried About Running Out of Food in the Last Year: Never true    Lalita of Food in the Last Year: Never true   Transportation Needs:     Lack of Transportation (Medical): Not on file    Lack of Transportation (Non-Medical): Not on file   Physical Activity:     Days of Exercise per Week: Not on file    Minutes of Exercise per Session: Not on file   Stress:     Feeling of Stress : Not on file   Social Connections:     Frequency of Communication with Friends and Family: Not on file    Frequency of Social Gatherings with Friends and Family: Not on file    Attends Mandaen Services: Not on file    Active Member of [address] [place] [address] or Organizations: Not on file    Attends Club or Organization Meetings: Not on file    Marital Status: Not on file   Intimate Partner Violence:     Fear of Current or Ex-Partner: Not on file    Emotionally Abused: Not on file    Physically Abused: Not on file    Sexually Abused: Not on file   Housing Stability:     Unable to Pay for Housing in the Last Year: Not on file    Number of [place] in the Last Year: Not on file    Unstable Housing in the Last Year: Not on file       Vitals:    04/12/22 1042   BP: 123/64   Temp: 97.6 °F (36.4 °C)   TempSrc: Temporal   Weight: 178 lb (80.7 kg)       Focused Lower Extremity Physical Exam:  Vitals:    04/12/22 1042   BP: 123/64   Temp: 97.6 °F (36.4 °C)        Foot Exam     Ortho Exam    Vascular: pulses  dp  pt palpable  Capillary Refill Time:   Hair growth  Skin:    Edema:    Neurologic:      Musculoskeletal/ Orthopedic examination: Pain with palpation at the fifth digit left foot ecchymosis and edema noted to the fifth digit left foot.   NAIL  Web space   Derm:    XR FOOT LEFT (MIN 3 VIEWS)    Result Date: 4/12/2022  EXAMINATION: THREE XRAY VIEWS OF THE LEFT FOOT 4/12/2022 10:58 am COMPARISON: 03/22/2022 HISTORY: ORDERING SYSTEM PROVIDED HISTORY: Closed fracture of left foot, initial encounter TECHNOLOGIST PROVIDED HISTORY: Reason for exam:->non wb FINDINGS: There is a healing oblique fracture, minimally comminuted involving the proximal phalanx of the 5th digit. Callus formation is noted. Alignment is stable. No new fractures. Bone density appears appropriate. Soft tissues are within normal limits. Healing fracture of the proximal phalanx of the 5th digit. Stable alignment. XR FOOT LEFT (MIN 3 VIEWS)    Result Date: 3/23/2022  EXAMINATION: THREE XRAY VIEWS OF THE LEFT FOOT 3/22/2022 3:44 pm COMPARISON: The previous study performed 07/01/2016. HISTORY: ORDERING SYSTEM PROVIDED HISTORY: Left foot pain TECHNOLOGIST PROVIDED HISTORY: Reason for exam:->pain, injury FINDINGS: There is a minimally displaced oblique fracture involving the shaft of the proximal phalanx of the 5th toe. No dislocation is seen. Soft tissue swelling is noted about the 5th toe. No other fracture is identified. No significant osteo-degenerative changes or other osseous abnormalities are noted. Minimally displaced fracture involving the proximal phalanx of the 5th toe. RECOMMENDATION: Findings were sent to the Radiology Results [address] at 11:02 a.m. on 03/23/2022 to be communicated to a licensed caregiver. Assessment and Plan: Patient to continue wearing a postop shoe 3 weeks limited activities including standing and walking. Pari Richardson was seen today for foot injury, referral - general and established new doctor. Diagnoses and all orders for this visit:    Closed fracture of left foot, initial encounter  -     XR FOOT LEFT (MIN 3 VIEWS); Future        No follow-ups on file. Seen By:  Debo Reynaga DPM      Document was created using voice recognition software. Note was reviewed, however may contain grammatical errors.

## 2022-04-19 ENCOUNTER — OFFICE VISIT (OUTPATIENT)
Dept: FAMILY MEDICINE CLINIC | Age: 52
End: 2022-04-19
Payer: COMMERCIAL

## 2022-04-19 VITALS
TEMPERATURE: 97.2 F | DIASTOLIC BLOOD PRESSURE: 70 MMHG | WEIGHT: 173.2 LBS | OXYGEN SATURATION: 97 % | SYSTOLIC BLOOD PRESSURE: 122 MMHG | HEART RATE: 66 BPM | BODY MASS INDEX: 27.96 KG/M2

## 2022-04-19 DIAGNOSIS — R19.7 NAUSEA VOMITING AND DIARRHEA: Primary | ICD-10-CM

## 2022-04-19 DIAGNOSIS — R11.2 NAUSEA VOMITING AND DIARRHEA: Primary | ICD-10-CM

## 2022-04-19 PROCEDURE — 99213 OFFICE O/P EST LOW 20 MIN: CPT | Performed by: PHYSICIAN ASSISTANT

## 2022-04-19 RX ORDER — ONDANSETRON 4 MG/1
4 TABLET, ORALLY DISINTEGRATING ORAL 3 TIMES DAILY PRN
Qty: 21 TABLET | Refills: 0 | Status: SHIPPED
Start: 2022-04-19 | End: 2022-06-06 | Stop reason: SDUPTHER

## 2022-04-19 NOTE — PROGRESS NOTES
22  Jaimee Crespo : 1970 Sex: female  Age 45 y.o. Subjective:  Chief Complaint   Patient presents with    Emesis    Diarrhea         HPI:   Jaimee Crespo , 45 y.o. female presents to express care for evaluation of nausea, vomiting, diarrhea    HPI  68-year-old female presents to express care for evaluation of nausea, vomiting, diarrhea. The patient started having the symptoms yesterday. The patient states that multiple coworkers of all had the same. She does work in the [employer]. The patient states that she had numerous episodes of emesis. The patient has had numerous episodes of diarrhea. The patient without any hematemesis or coffee-ground emesis. The patient does not have any fevers. The patient has been able to drink a little bit of water this morning. The patient is not having any urinary symptoms. ROS:   Unless otherwise stated in this report the patient's positive and negative responses for review of systems for constitutional, eyes, ENT, cardiovascular, respiratory, gastrointestinal, neurological, , musculoskeletal, and integument systems and related systems to the presenting problem are either stated in the history of present illness or were not pertinent or were negative for the symptoms and/or complaints related to the presenting medical problem. Positives and pertinent negatives as per HPI. All others reviewed and are negative.       PMH:     Past Medical History:   Diagnosis Date    Blood in urine     history of    Constipation     Endometriosis     H/O superficial phlebitis 2021    Migraines     Raynaud's disease     Superficial phlebitis of leg, right 2020    Varicose veins of leg with pain, right 2020       Past Surgical History:   Procedure Laterality Date    ANKLE SURGERY      APPENDECTOMY      CHOLECYSTECTOMY      CYSTOSCOPY      HERNIA REPAIR      LAPAROSCOPY      multiple for endometriosis    TONSILLECTOMY Family History   Problem Relation Age of Onset    Heart Disease Mother     Thyroid Disease Mother     Other Mother         kidney problems    Heart Disease Father     Allergy (Severe) Father         seasonal    Other Maternal Grandmother         brain tumor    Other Maternal Aunt         aneurysm    Cancer Paternal Cousin         ovarian        Medications:     Current Outpatient Medications:     ondansetron (ZOFRAN-ODT) 4 MG disintegrating tablet, Take 1 tablet by mouth 3 times daily as needed for Nausea or Vomiting, Disp: 21 tablet, Rfl: 0    silver sulfADIAZINE (SILVADENE) 1 % cream, Apply topically daily. , Disp: 400 g, Rfl: 1    SUMAtriptan (IMITREX) 50 MG tablet, 1/2-1 po at onset of migraine PRN, may repeat x 1 in 2hrs, Disp: 9 tablet, Rfl: 3    Allergies: Allergies   Allergen Reactions    Morphine      Reaction is reddened skin and patient \"blacks out\"    Oxycodone Anxiety    Tomato Anaphylaxis    Alprazolam      Reaction is hallucinations  hallucinations    Hydrocodone-Acetaminophen      GI upset    Influenza Vaccines      ? Neuro issue and neurologist said not to get again    Oseltamivir     Oxycontin [Oxycodone Hcl]      Reaction is reddened skin and patient \"blacks out\"    Tamiflu [Oseltamivir Phosphate]      Reaction is hallucinations    Topiramate      Weakness, memory loss, nausea/emesis, sweats    Adhesive Tape Rash     Reaction is sloughing of skin  Skin comes off    Oseltamivir Phosphate Nausea And Vomiting       Social History:     Social History     Tobacco Use    Smoking status: Never Smoker    Smokeless tobacco: Never Used   Vaping Use    Vaping Use: Never used   Substance Use Topics    Alcohol use: No    Drug use: No       Patient lives at home.     Physical Exam:     Vitals:    04/19/22 1043   BP: 122/70   Site: Right Upper Arm   Position: Sitting   Pulse: 66   Temp: 97.2 °F (36.2 °C)   TempSrc: Temporal   SpO2: 97%   Weight: 173 lb 3.2 oz (78.6 kg) Exam:  Physical Exam  Nurses note and vital signs reviewed and patient is not hypoxic. General: The patient appears well and in no apparent distress. Patient is resting comfortably on cart. Skin: Warm, dry, no pallor noted. There is no rash noted. Head: Normocephalic, atraumatic. Eye: Normal conjunctiva  Ears, Nose, Mouth, and Throat: Mildly dry mucous membranes  Cardiovascular: Regular Rate and Rhythm  Respiratory: Patient is in no distress, no accessory muscle use, lungs are clear to auscultation, no wheezing, crackles or rhonchi  Back: Non-tender, no CVA tenderness bilaterally to percussion. GI: Normal bowel sounds, no tenderness to palpation, no masses appreciated. No rebound, guarding, or rigidity noted. Neurological: A&O x4, normal speech        Testing:           Medical Decision Making:     Vital signs reviewed    Past medical history reviewed. Allergies reviewed. Medications reviewed. Patient on arrival does not appear to be in any apparent distress or discomfort. The patient has been seen and evaluated. The patient does not appear to be toxic or lethargic. The patient's vital signs are all noted to be within normal limits. The patient does not appear to be toxic or lethargic. The patient does appear well. The patient had differential diagnosis discussed with her. Offered to send the patient to the emergency department for IV hydration laboratory studies. The patient declined. The patient would like to try the Zofran to see if that does not help. Its worked for her in the past.  The patient will use Imodium over-the-counter. The patient will continue to hydrate. The patient should use electrolyte type beverages. She is to have a low threshold if she cannot keep anything down she needs to go directly to the emergency department. The patient is to return to express care or go directly to the emergency department should any of the signs or symptoms worsen.  The patient is to followup with primary care physician in 2-3 days for repeat evaluation. The patient has no other questions or concerns at this time the patient will be discharged home. Clinical Impression:   Génesis Sal was seen today for emesis and diarrhea. Diagnoses and all orders for this visit:    Nausea vomiting and diarrhea    Other orders  -     ondansetron (ZOFRAN-ODT) 4 MG disintegrating tablet; Take 1 tablet by mouth 3 times daily as needed for Nausea or Vomiting        The patient is to call for any concerns or return if any of the signs or symptoms worsen. The patient is to follow-up with PCP in the next 2-3 days for repeat evaluation repeat assessment or go directly to the emergency department.      SIGNATURE: Ally Christiansen III, PA-C

## 2022-05-12 ENCOUNTER — OFFICE VISIT (OUTPATIENT)
Dept: PODIATRY | Age: 52
End: 2022-05-12
Payer: COMMERCIAL

## 2022-05-12 VITALS
DIASTOLIC BLOOD PRESSURE: 78 MMHG | WEIGHT: 173 LBS | TEMPERATURE: 98.2 F | BODY MASS INDEX: 27.92 KG/M2 | SYSTOLIC BLOOD PRESSURE: 116 MMHG

## 2022-05-12 DIAGNOSIS — S92.902D CLOSED FRACTURE OF LEFT FOOT WITH ROUTINE HEALING, SUBSEQUENT ENCOUNTER: Primary | ICD-10-CM

## 2022-05-12 PROCEDURE — 99213 OFFICE O/P EST LOW 20 MIN: CPT | Performed by: PODIATRIST

## 2022-05-12 NOTE — PROGRESS NOTES
22  Chencho Reilly : 1970 Sex: female  Age: 45 y.o. Patient was referred by Charmayne Pitcher, MD    Chief Complaint   Patient presents with    Foot Injury     left foot fx in post op shoe       SUBJECTIVE this patient is seen today for follow-up of fifth fracture patient has been in a postop shoe for 3 and half weeks. HPI  Review of Systems  Const: Denies constitutional symptoms  Musculo: Denies symptoms other than stated above  Skin: Denies symptoms other than stated above       Current Outpatient Medications:     ondansetron (ZOFRAN-ODT) 4 MG disintegrating tablet, Take 1 tablet by mouth 3 times daily as needed for Nausea or Vomiting, Disp: 21 tablet, Rfl: 0    silver sulfADIAZINE (SILVADENE) 1 % cream, Apply topically daily. , Disp: 400 g, Rfl: 1    SUMAtriptan (IMITREX) 50 MG tablet, 1/2-1 po at onset of migraine PRN, may repeat x 1 in 2hrs, Disp: 9 tablet, Rfl: 3  Allergies   Allergen Reactions    Morphine      Reaction is reddened skin and patient \"blacks out\"    Oxycodone Anxiety    Tomato Anaphylaxis    Alprazolam      Reaction is hallucinations  hallucinations    Hydrocodone-Acetaminophen      GI upset    Influenza Vaccines      ?  Neuro issue and neurologist said not to get again    Oseltamivir     Oxycontin [Oxycodone Hcl]      Reaction is reddened skin and patient \"blacks out\"    Tamiflu [Oseltamivir Phosphate]      Reaction is hallucinations    Topiramate      Weakness, memory loss, nausea/emesis, sweats    Adhesive Tape Rash     Reaction is sloughing of skin  Skin comes off    Oseltamivir Phosphate Nausea And Vomiting       Past Medical History:   Diagnosis Date    Blood in urine     history of    Constipation     Endometriosis     H/O superficial phlebitis 2021    Migraines     Raynaud's disease     Superficial phlebitis of leg, right 2020    Varicose veins of leg with pain, right 2020     Past Surgical History:   Procedure Laterality Date    ANKLE SURGERY      APPENDECTOMY      CHOLECYSTECTOMY      CYSTOSCOPY      HERNIA REPAIR      LAPAROSCOPY      multiple for endometriosis    TONSILLECTOMY       Family History   Problem Relation Age of Onset    Heart Disease Mother     Thyroid Disease Mother     Other Mother         kidney problems    Heart Disease Father     Allergy (Severe) Father         seasonal    Other Maternal Grandmother         brain tumor    Other Maternal Aunt         aneurysm    Cancer Paternal Cousin         ovarian      Social History     Socioeconomic History    Marital status: Single     Spouse name: Not on file    Number of children: Not on file    Years of education: Not on file    Highest education level: Not on file   Occupational History    Not on file   Tobacco Use    Smoking status: Never Smoker    Smokeless tobacco: Never Used   Vaping Use    Vaping Use: Never used   Substance and Sexual Activity    Alcohol use: No    Drug use: No    Sexual activity: Not on file   Other Topics Concern    Not on file   Social History Narrative    PMH:    Medical Problems:    Endometriosis, Raynauds, Migraine    Surgical Hx:    Appendectomy, Removal of Gallbladder    Laparoscopy - Endometriosis    Hernia Repair - Hiatal    Lt ankle -- several surgeries, cartilage removal    Reviewed, no changes. FH:    Father:    Seasonal Allergies, Heart Disease. Mother:    Heart Disease. paternal cousin ovarian cancer in 42's;    maternal aunt berry aneurysm    maternal grandmother brain tumor    Reviewed, no changes. SH:    Marital: Single. Work Status: Full-Time Employment - - [employer]. Personal Habits: Cigarette Use: Nonsmoker. Alcohol: Never used alcohol.      Social Determinants of Health     Financial Resource Strain: Low Risk     Difficulty of Paying Living Expenses: Not hard at all   Food Insecurity: No Food Insecurity    Worried About Running Out of Food in the Last Year: Never true    [address] [facility] in the Last Year: Never true   Transportation Needs:     Lack of Transportation (Medical): Not on file    Lack of Transportation (Non-Medical): Not on file   Physical Activity:     Days of Exercise per Week: Not on file    Minutes of Exercise per Session: Not on file   Stress:     Feeling of Stress : Not on file   Social Connections:     Frequency of Communication with Friends and Family: Not on file    Frequency of Social Gatherings with Friends and Family: Not on file    Attends Yarsanism Services: Not on file    Active Member of [address] [place] [address] [employer] or Organizations: Not on file    Attends Club or Organization Meetings: Not on file    Marital Status: Not on file   Intimate Partner Violence:     Fear of Current or Ex-Partner: Not on file    Emotionally Abused: Not on file    Physically Abused: Not on file    Sexually Abused: Not on file   Housing Stability:     Unable to Pay for Housing in the Last Year: Not on file    Number of [place] in the Last Year: Not on file    Unstable Housing in the Last Year: Not on file       Vitals:    05/12/22 1016   BP: 116/78   Temp: 98.2 °F (36.8 °C)   TempSrc: Temporal   Weight: 173 lb (78.5 kg)       Focused Lower Extremity Physical Exam:  Vitals:    05/12/22 1016   BP: 116/78   Temp: 98.2 °F (36.8 °C)        Foot Exam     Ortho Exam    Vascular: pulses  dp  pt palpable  Capillary Refill Time:   Hair growth  Skin:    Edema:    Neurologic:      Musculoskeletal/ Orthopedic examination: Mild pain with palpation fifth digit right foot  NAIL  Web space   Derm:      XR FOOT LEFT (MIN 3 VIEWS)    Result Date: 4/12/2022  EXAMINATION: THREE XRAY VIEWS OF THE LEFT FOOT 4/12/2022 10:58 am COMPARISON: 03/22/2022 HISTORY: ORDERING SYSTEM PROVIDED HISTORY: Closed fracture of left foot, initial encounter TECHNOLOGIST PROVIDED HISTORY: Reason for exam:->non wb FINDINGS: There is a healing oblique fracture, minimally comminuted involving the proximal phalanx of the 5th digit.   Callus formation is noted. Alignment is stable. No new fractures. Bone density appears appropriate. Soft tissues are within normal limits. Healing fracture of the proximal phalanx of the 5th digit. Stable alignment. Assessment and Plan: At this time the patient is to stay in a postop shoe at work and transition out of the postop shoe to a comfortable wide tennis shoe at home reappoint 3 weeks  Hazel Putnam was seen today for foot injury. Diagnoses and all orders for this visit:    Closed fracture of left foot with routine healing, subsequent encounter  -     XR FOOT LEFT (MIN 3 VIEWS); Future        No follow-ups on file. Seen By:  Mercedes Burroughs DPM      Document was created using voice recognition software. Note was reviewed, however may contain grammatical errors.

## 2022-06-01 DIAGNOSIS — S92.902D CLOSED FRACTURE OF LEFT FOOT WITH ROUTINE HEALING, SUBSEQUENT ENCOUNTER: Primary | ICD-10-CM

## 2022-06-04 ENCOUNTER — OFFICE VISIT (OUTPATIENT)
Dept: PODIATRY | Age: 52
End: 2022-06-04
Payer: COMMERCIAL

## 2022-06-04 VITALS — WEIGHT: 173 LBS | BODY MASS INDEX: 27.92 KG/M2

## 2022-06-04 DIAGNOSIS — S92.902D CLOSED FRACTURE OF LEFT FOOT WITH ROUTINE HEALING, SUBSEQUENT ENCOUNTER: Primary | ICD-10-CM

## 2022-06-04 PROCEDURE — 99213 OFFICE O/P EST LOW 20 MIN: CPT | Performed by: PODIATRIST

## 2022-06-04 NOTE — PROGRESS NOTES
22  Duglas Felder : 1970 Sex: female  Age: 45 y.o. Patient was referred by Ruta Dance, MD    Chief Complaint   Patient presents with    Foot Injury     left foot fx her post op shoe carole had a new xray done this week     Foot Pain       SUBJECTIVE this patient is seen today for follow-up of fifth digit fx  patient has been in a postop shoe for 5 and half weeks. HPI  Review of Systems  Const: Denies constitutional symptoms  Musculo: Denies symptoms other than stated above  Skin: Denies symptoms other than stated above       Current Outpatient Medications:     ondansetron (ZOFRAN-ODT) 4 MG disintegrating tablet, Take 1 tablet by mouth 3 times daily as needed for Nausea or Vomiting (Patient not taking: Reported on 2022), Disp: 21 tablet, Rfl: 0    silver sulfADIAZINE (SILVADENE) 1 % cream, Apply topically daily. (Patient not taking: Reported on 2022), Disp: 400 g, Rfl: 1    SUMAtriptan (IMITREX) 50 MG tablet, 1/2-1 po at onset of migraine PRN, may repeat x 1 in 2hrs (Patient not taking: Reported on 2022), Disp: 9 tablet, Rfl: 3  Allergies   Allergen Reactions    Morphine      Reaction is reddened skin and patient \"blacks out\"    Oxycodone Anxiety    Tomato Anaphylaxis    Alprazolam      Reaction is hallucinations  hallucinations    Hydrocodone-Acetaminophen      GI upset    Influenza Vaccines      ?  Neuro issue and neurologist said not to get again    Oseltamivir     Oxycontin [Oxycodone Hcl]      Reaction is reddened skin and patient \"blacks out\"    Tamiflu [Oseltamivir Phosphate]      Reaction is hallucinations    Topiramate      Weakness, memory loss, nausea/emesis, sweats    Adhesive Tape Rash     Reaction is sloughing of skin  Skin comes off    Oseltamivir Phosphate Nausea And Vomiting       Past Medical History:   Diagnosis Date    Blood in urine     history of    Constipation     Endometriosis     H/O superficial phlebitis 2021    Migraines     Raynaud's disease     Superficial phlebitis of leg, right 9/30/2020    Varicose veins of leg with pain, right 9/30/2020     Past Surgical History:   Procedure Laterality Date    ANKLE SURGERY      APPENDECTOMY      CHOLECYSTECTOMY      CYSTOSCOPY      HERNIA REPAIR      LAPAROSCOPY      multiple for endometriosis    TONSILLECTOMY       Family History   Problem Relation Age of Onset    Heart Disease Mother     Thyroid Disease Mother     Other Mother         kidney problems    Heart Disease Father     Allergy (Severe) Father         seasonal    Other Maternal Grandmother         brain tumor    Other Maternal Aunt         aneurysm    Cancer Paternal Cousin         ovarian      Social History     Socioeconomic History    Marital status: Single     Spouse name: Not on file    Number of children: Not on file    Years of education: Not on file    Highest education level: Not on file   Occupational History    Not on file   Tobacco Use    Smoking status: Never Smoker    Smokeless tobacco: Never Used   Vaping Use    Vaping Use: Never used   Substance and Sexual Activity    Alcohol use: No    Drug use: No    Sexual activity: Not on file   Other Topics Concern    Not on file   Social History Narrative    PMH:    Medical Problems:    Endometriosis, Raynauds, Migraine    Surgical Hx:    Appendectomy, Removal of Gallbladder    Laparoscopy - Endometriosis    Hernia Repair - Hiatal    Lt ankle -- several surgeries, cartilage removal    Reviewed, no changes. FH:    Father:    Seasonal Allergies, Heart Disease. Mother:    Heart Disease. paternal cousin ovarian cancer in 42's;    maternal aunt berry aneurysm    maternal grandmother brain tumor    Reviewed, no changes. SH:    Marital: Single. Work Status: Full-Time Employment - - [employer]. Personal Habits: Cigarette Use: Nonsmoker. Alcohol: Never used alcohol.      Social Determinants of Health     Financial Resource Strain: Low Risk     Difficulty of Paying Living Expenses: Not hard at all   Food Insecurity: No Food Insecurity    Worried About Running Out of Food in the Last Year: Never true    Ran Out of Food in the Last Year: Never true   Transportation Needs:     Lack of Transportation (Medical): Not on file    Lack of Transportation (Non-Medical): Not on file   Physical Activity:     Days of Exercise per Week: Not on file    Minutes of Exercise per Session: Not on file   Stress:     Feeling of Stress : Not on file   Social Connections:     Frequency of Communication with Friends and Family: Not on file    Frequency of Social Gatherings with Friends and Family: Not on file    Attends Gnosticism Services: Not on file    Active Member of [address] [place] [address] [employer] or Organizations: Not on file    Attends Club or Organization Meetings: Not on file    Marital Status: Not on file   Intimate Partner Violence:     Fear of Current or Ex-Partner: Not on file    Emotionally Abused: Not on file    Physically Abused: Not on file    Sexually Abused: Not on file   Housing Stability:     Unable to Pay for Housing in the Last Year: Not on file    Number of [place] in the Last Year: Not on file    Unstable Housing in the Last Year: Not on file       Vitals:    06/04/22 1038   Weight: 173 lb (78.5 kg)       Focused Lower Extremity Physical Exam:  There were no vitals filed for this visit.      Foot Exam     Ortho Exam    Vascular: pulses  dp  pt palpable  Capillary Refill Time:   Hair growth  Skin:    Edema:    Neurologic:      Musculoskeletal/ Orthopedic examination: Mild pain with palpation fifth digit right foot  NAIL  Web space   Derm:      XR FOOT LEFT (MIN 3 VIEWS)    Result Date: 4/12/2022  EXAMINATION: THREE XRAY VIEWS OF THE LEFT FOOT 4/12/2022 10:58 am COMPARISON: 03/22/2022 HISTORY: ORDERING SYSTEM PROVIDED HISTORY: Closed fracture of left foot, initial encounter TECHNOLOGIST PROVIDED HISTORY: Reason for exam:->non wb FINDINGS: There is a healing pain.    Diagnoses and all orders for this visit:    Closed fracture of left foot with routine healing, subsequent encounter        No follow-ups on file. Seen By:  Hermila Chase DPM      Document was created using voice recognition software. Note was reviewed, however may contain grammatical errors.

## 2022-06-06 ENCOUNTER — TELEPHONE (OUTPATIENT)
Dept: PRIMARY CARE CLINIC | Age: 52
End: 2022-06-06

## 2022-06-06 RX ORDER — ONDANSETRON 4 MG/1
4 TABLET, ORALLY DISINTEGRATING ORAL 3 TIMES DAILY PRN
Qty: 21 TABLET | Refills: 0 | Status: SHIPPED
Start: 2022-06-06 | End: 2022-09-13 | Stop reason: SDUPTHER

## 2022-06-06 NOTE — TELEPHONE ENCOUNTER
Patient requesting medication for nausea when she gets migraines. 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 Seen by [facility]. Scheduled to start removal on Monday June 13th.

## 2022-07-08 ENCOUNTER — OFFICE VISIT (OUTPATIENT)
Dept: FAMILY MEDICINE CLINIC | Age: 52
End: 2022-07-08
Payer: COMMERCIAL

## 2022-07-08 VITALS
OXYGEN SATURATION: 96 % | HEART RATE: 63 BPM | DIASTOLIC BLOOD PRESSURE: 76 MMHG | WEIGHT: 176 LBS | TEMPERATURE: 97.3 F | BODY MASS INDEX: 28.28 KG/M2 | HEIGHT: 66 IN | SYSTOLIC BLOOD PRESSURE: 120 MMHG

## 2022-07-08 DIAGNOSIS — R11.2 NAUSEA AND VOMITING, INTRACTABILITY OF VOMITING NOT SPECIFIED, UNSPECIFIED VOMITING TYPE: ICD-10-CM

## 2022-07-08 DIAGNOSIS — J02.9 PHARYNGITIS, UNSPECIFIED ETIOLOGY: Primary | ICD-10-CM

## 2022-07-08 PROCEDURE — 99213 OFFICE O/P EST LOW 20 MIN: CPT | Performed by: FAMILY MEDICINE

## 2022-07-08 RX ORDER — AMOXICILLIN 875 MG/1
875 TABLET, COATED ORAL 2 TIMES DAILY
Qty: 14 TABLET | Refills: 0 | Status: SHIPPED | OUTPATIENT
Start: 2022-07-08 | End: 2022-07-15

## 2022-07-08 RX ORDER — METHYLPREDNISOLONE 4 MG/1
TABLET ORAL
Qty: 1 KIT | Refills: 0 | Status: SHIPPED
Start: 2022-07-08 | End: 2022-07-26

## 2022-07-08 ASSESSMENT — ENCOUNTER SYMPTOMS
TROUBLE SWALLOWING: 1
CHEST TIGHTNESS: 0
NAUSEA: 1
VOMITING: 1
EYES NEGATIVE: 1
RESPIRATORY NEGATIVE: 1
SHORTNESS OF BREATH: 0
SORE THROAT: 1

## 2022-07-08 NOTE — PROGRESS NOTES
Jayden Valdovinos (:  1970) is a 45 y.o. female,Established patient, here for evaluation of the following chief complaint(s):  Pharyngitis (x2-3 days)         ASSESSMENT/PLAN:  1. Pharyngitis, unspecified etiology  -     amoxicillin (AMOXIL) 875 MG tablet; Take 1 tablet by mouth 2 times daily for 7 days, Disp-14 tablet, R-0Normal  -     methylPREDNISolone (MEDROL DOSEPACK) 4 MG tablet; Take by mouth., Disp-1 kit, R-0Normal  -     Magic Mouthwash (MIRACLE MOUTHWASH); Swish and swallow 5 mLs 4 times daily as needed for Irritation Equal parts viscous lidocaine, diphenhydramine, and nystatin, Disp-237 mL, R-0Normal  2. Nausea and vomiting, intractability of vomiting not specified, unspecified vomiting type  At this time we will treat symptomatically. Red flags discussed with patient. If these occur she is to go directly to the nearest emergency department. No follow-ups on file. Subjective   SUBJECTIVE/OBJECTIVE:  HPI  Patient presents today for sore throat for the last 2 to 3 days. Patient states that prior to her sore throat she did have which she referred to an episode of food poisoning which she did vomit copiously. This symptom has resolved. Patient denies any overt chest or abdominal pain at this time. Denies any diarrhea. Denies any systemic illness prior to symptoms beginning. Denies any known sick contacts or recent travel. Review of Systems   Constitutional: Negative for fever. HENT: Positive for hearing loss, postnasal drip, sore throat and trouble swallowing. Eyes: Negative. Respiratory: Negative. Negative for chest tightness and shortness of breath. Cardiovascular: Negative. Negative for chest pain. Gastrointestinal: Positive for nausea and vomiting. Musculoskeletal: Negative for neck pain. Skin: Negative for rash. Neurological: Negative for headaches. Hematological: Negative for adenopathy. All other systems reviewed and are negative.          Current Outpatient Medications:     metroNIDAZOLE (METROCREAM) 0.75 % cream, APPLY TOPICALLY TO FACE EVERY DAY IN THE MORNING, Disp: , Rfl:     amoxicillin (AMOXIL) 875 MG tablet, Take 1 tablet by mouth 2 times daily for 7 days, Disp: 14 tablet, Rfl: 0    methylPREDNISolone (MEDROL DOSEPACK) 4 MG tablet, Take by mouth., Disp: 1 kit, Rfl: 0    Magic Mouthwash (MIRACLE MOUTHWASH), Swish and swallow 5 mLs 4 times daily as needed for Irritation Equal parts viscous lidocaine, diphenhydramine, and nystatin, Disp: 237 mL, Rfl: 0    ondansetron (ZOFRAN-ODT) 4 MG disintegrating tablet, Take 1 tablet by mouth 3 times daily as needed for Nausea or Vomiting, Disp: 21 tablet, Rfl: 0    SUMAtriptan (IMITREX) 50 MG tablet, 1/2-1 po at onset of migraine PRN, may repeat x 1 in 2hrs, Disp: 9 tablet, Rfl: 3    silver sulfADIAZINE (SILVADENE) 1 % cream, Apply topically daily. (Patient not taking: Reported on 6/4/2022), Disp: 400 g, Rfl: 1   Patient Active Problem List   Diagnosis    Weight gain    Overweight (BMI 25.0-29. 9)    Allergic rhinitis    Migraine    Acute pain of both knees    Nontraumatic incomplete tear of right rotator cuff    Pituitary neoplasm    Varicose veins of both lower extremities with inflammation    Neoplasm of uncertain behavior of skin    Varicose veins of leg with pain, right    Elevated liver enzymes    H/O superficial phlebitis    Insomnia    Exposure to COVID-19 virus    Hypokalemia    Acute bacterial sinusitis    Pharyngitis    Nausea and vomiting     Past Medical History:   Diagnosis Date    Blood in urine     history of    Constipation     Endometriosis     H/O superficial phlebitis 4/29/2021    Migraines     Raynaud's disease     Superficial phlebitis of leg, right 9/30/2020    Varicose veins of leg with pain, right 9/30/2020     Past Surgical History:   Procedure Laterality Date    ANKLE SURGERY      APPENDECTOMY      CHOLECYSTECTOMY      CYSTOSCOPY      HERNIA REPAIR  LAPAROSCOPY      multiple for endometriosis    TONSILLECTOMY       Social History     Socioeconomic History    Marital status: Single     Spouse name: Not on file    Number of children: Not on file    Years of education: Not on file    Highest education level: Not on file   Occupational History    Not on file   Tobacco Use    Smoking status: Never Smoker    Smokeless tobacco: Never Used   Vaping Use    Vaping Use: Never used   Substance and Sexual Activity    Alcohol use: No    Drug use: No    Sexual activity: Not on file   Other Topics Concern    Not on file   Social History Narrative    PMH:    Medical Problems:    Endometriosis, Raynauds, Migraine    Surgical Hx:    Appendectomy, Removal of Gallbladder    Laparoscopy - Endometriosis    Hernia Repair - Hiatal    Lt ankle -- several surgeries, cartilage removal    Reviewed, no changes. FH:    Father:    Seasonal Allergies, Heart Disease. Mother:    Heart Disease. paternal cousin ovarian cancer in 42's;    maternal aunt berry aneurysm    maternal grandmother brain tumor    Reviewed, no changes. SH:    Marital: Single. Work Status: Full-Time Employment - - [employer]. Personal Habits: Cigarette Use: Nonsmoker. Alcohol: Never used alcohol. Social Determinants of Health     Financial Resource Strain: Low Risk     Difficulty of Paying Living Expenses: Not hard at all   Food Insecurity: No Food Insecurity    Worried About Running Out of Food in the Last Year: Never true    Lalita of Food in the Last Year: Never true   Transportation Needs:     Lack of Transportation (Medical): Not on file    Lack of Transportation (Non-Medical):  Not on file   Physical Activity:     Days of Exercise per Week: Not on file    Minutes of Exercise per Session: Not on file   Stress:     Feeling of Stress : Not on file   Social Connections:     Frequency of Communication with Friends and Family: Not on file    Frequency of Social Gatherings with Friends and Family: Not on file    Attends Quaker Services: Not on file    Active Member of Clubs or Organizations: Not on file    Attends Club or Organization Meetings: Not on file    Marital Status: Not on file   Intimate Partner Violence:     Fear of Current or Ex-Partner: Not on file    Emotionally Abused: Not on file    Physically Abused: Not on file    Sexually Abused: Not on file   Housing Stability:     Unable to Pay for Housing in the Last Year: Not on file    Number of [place] in the Last Year: Not on file    Unstable Housing in the Last Year: Not on file     Family History   Problem Relation Age of Onset    Heart Disease Mother     Thyroid Disease Mother     Other Mother         kidney problems    Heart Disease Father     Allergy (Severe) Father         seasonal    Other Maternal Grandmother         brain tumor    Other Maternal Aunt         aneurysm    Cancer Paternal Cousin         ovarian       There are no preventive care reminders to display for this patient. There are no preventive care reminders to display for this patient. There are no preventive care reminders to display for this patient. Health Maintenance Due   Topic    Shingles vaccine (1 of 2)      Health Maintenance   Topic Date Due    Shingles vaccine (1 of 2) Never done    Depression Screen  10/08/2022    Breast cancer screen  10/22/2022    Cervical cancer screen  11/14/2024    DTaP/Tdap/Td vaccine (2 - Td or Tdap) 11/17/2025    Lipids  10/22/2026    Colorectal Cancer Screen  02/01/2027    COVID-19 Vaccine  Completed    Hepatitis C screen  Completed    Hepatitis A vaccine  Aged Out    Hepatitis B vaccine  Aged Out    Hib vaccine  Aged Out    Meningococcal (ACWY) vaccine  Aged Out    Pneumococcal 0-64 years Vaccine  Aged Out    HIV screen  Discontinued      There are no preventive care reminders to display for this patient. There are no preventive care reminders to display for this patient. /76   Pulse 63   Temp 97.3 °F (36.3 °C)   Ht 5' 6\" (1.676 m)   Wt 176 lb (79.8 kg)   SpO2 96%   BMI 28.41 kg/m²     Objective   Physical Exam  Vitals reviewed. Constitutional:       Appearance: She is well-developed. She is ill-appearing. HENT:      Head: Normocephalic and atraumatic. Right Ear: Hearing normal. A middle ear effusion is present. Tympanic membrane is bulging. Left Ear: Hearing normal. A middle ear effusion is present. Tympanic membrane is bulging. Nose: Nose normal.      Mouth/Throat:      Dentition: Normal dentition. Pharynx: Posterior oropharyngeal erythema present. No oropharyngeal exudate. Tonsils: No tonsillar exudate. Eyes:      Conjunctiva/sclera: Conjunctivae normal.      Pupils: Pupils are equal, round, and reactive to light. Cardiovascular:      Rate and Rhythm: Normal rate and regular rhythm. Heart sounds: Normal heart sounds. No murmur heard. Pulmonary:      Effort: Pulmonary effort is normal. No respiratory distress. Breath sounds: Normal breath sounds. No wheezing. Abdominal:      General: Bowel sounds are normal. There is no distension. Palpations: Abdomen is soft. Musculoskeletal:      Cervical back: Normal range of motion and neck supple. Lymphadenopathy:      Cervical: No cervical adenopathy. Skin:     General: Skin is warm and dry. Findings: No rash. Neurological:      Mental Status: She is alert. Psychiatric:         Behavior: Behavior normal.                  An electronic signature was used to authenticate this note.     --Beltran Deleon DO

## 2022-07-11 ENCOUNTER — OFFICE VISIT (OUTPATIENT)
Dept: PRIMARY CARE CLINIC | Age: 52
End: 2022-07-11
Payer: COMMERCIAL

## 2022-07-11 VITALS
HEART RATE: 92 BPM | DIASTOLIC BLOOD PRESSURE: 70 MMHG | SYSTOLIC BLOOD PRESSURE: 122 MMHG | OXYGEN SATURATION: 95 % | TEMPERATURE: 97.8 F | BODY MASS INDEX: 28.73 KG/M2 | WEIGHT: 178 LBS

## 2022-07-11 DIAGNOSIS — J02.9 PHARYNGITIS, UNSPECIFIED ETIOLOGY: Primary | ICD-10-CM

## 2022-07-11 DIAGNOSIS — J02.9 PHARYNGITIS, UNSPECIFIED ETIOLOGY: ICD-10-CM

## 2022-07-11 PROCEDURE — 99213 OFFICE O/P EST LOW 20 MIN: CPT | Performed by: FAMILY MEDICINE

## 2022-07-11 RX ORDER — LIDOCAINE HYDROCHLORIDE 20 MG/ML
SOLUTION OROPHARYNGEAL
COMMUNITY
Start: 2022-07-08 | End: 2022-09-13

## 2022-07-11 ASSESSMENT — PATIENT HEALTH QUESTIONNAIRE - PHQ9
2. FEELING DOWN, DEPRESSED OR HOPELESS: 0
SUM OF ALL RESPONSES TO PHQ9 QUESTIONS 1 & 2: 0
SUM OF ALL RESPONSES TO PHQ QUESTIONS 1-9: 0
1. LITTLE INTEREST OR PLEASURE IN DOING THINGS: 0
SUM OF ALL RESPONSES TO PHQ QUESTIONS 1-9: 0

## 2022-07-11 NOTE — PROGRESS NOTES
Judi Cervantes : 1970 Sex: female  Age: 45 y.o. Chief Complaint   Patient presents with    Pharyngitis    Other     was seen in [facility] ; did not start medications that were presribed        HPI  HPI:      Patient presents today with persistent sore throat. States last week had \"food poisoning\" where she had severe emesis, this resolved. Then developed sore throat, was seen in [facility] , did not start the medications that were prescribed including Magic mouthwash Medrol Dosepak and amoxicillin. Still has sore throat. States she sees areas of yellow that were once white. No fever sinus pain pressure earache. Abdominal symptoms resolved. No chest pain shortness of breath. No other complaints or concerns. ROS:  As above      Current Outpatient Medications:     nystatin (MYCOSTATIN) 145894 UNIT/ML suspension, 5mL PO QID  swish and swallow x up to 14 days. Retain in mouth as long as possible before swallowing, Disp: 280 mL, Rfl: 0    metroNIDAZOLE (METROCREAM) 0.75 % cream, APPLY TOPICALLY TO FACE EVERY DAY IN THE MORNING, Disp: , Rfl:     ondansetron (ZOFRAN-ODT) 4 MG disintegrating tablet, Take 1 tablet by mouth 3 times daily as needed for Nausea or Vomiting, Disp: 21 tablet, Rfl: 0    SUMAtriptan (IMITREX) 50 MG tablet, 1/2-1 po at onset of migraine PRN, may repeat x 1 in 2hrs, Disp: 9 tablet, Rfl: 3    lidocaine viscous hcl (XYLOCAINE) 2 % SOLN solution, , Disp: , Rfl:     amoxicillin (AMOXIL) 875 MG tablet, Take 1 tablet by mouth 2 times daily for 7 days (Patient not taking: Reported on 2022), Disp: 14 tablet, Rfl: 0    methylPREDNISolone (MEDROL DOSEPACK) 4 MG tablet, Take by mouth.  (Patient not taking: Reported on 2022), Disp: 1 kit, Rfl: 0    Magic Mouthwash (MIRACLE MOUTHWASH), Swish and swallow 5 mLs 4 times daily as needed for Irritation Equal parts viscous lidocaine, diphenhydramine, and nystatin (Patient not taking: Reported on 2022), Disp: 421 mL, Rfl: 0  Allergies   Allergen Reactions    Morphine      Reaction is reddened skin and patient \"blacks out\"    Oxycodone Anxiety    Tomato Anaphylaxis    Alprazolam      Reaction is hallucinations  hallucinations    Hydrocodone-Acetaminophen      GI upset    Influenza Vaccines      ?  Neuro issue and neurologist said not to get again    Oseltamivir     Oxycontin [Oxycodone Hcl]      Reaction is reddened skin and patient \"blacks out\"    Tamiflu [Oseltamivir Phosphate]      Reaction is hallucinations    Topiramate      Weakness, memory loss, nausea/emesis, sweats    Adhesive Tape Rash     Reaction is sloughing of skin  Skin comes off    Oseltamivir Phosphate Nausea And Vomiting       Past Medical History:   Diagnosis Date    Blood in urine     history of    Constipation     Endometriosis     H/O superficial phlebitis 4/29/2021    Migraines     Raynaud's disease     Superficial phlebitis of leg, right 9/30/2020    Varicose veins of leg with pain, right 9/30/2020     Past Surgical History:   Procedure Laterality Date    ANKLE SURGERY      APPENDECTOMY      CHOLECYSTECTOMY      CYSTOSCOPY      HERNIA REPAIR      LAPAROSCOPY      multiple for endometriosis    TONSILLECTOMY       Family History   Problem Relation Age of Onset    Heart Disease Mother     Thyroid Disease Mother     Other Mother         kidney problems    Heart Disease Father     Allergy (Severe) Father         seasonal    Other Maternal Grandmother         brain tumor    Other Maternal Aunt         aneurysm    Cancer Paternal Cousin         ovarian      Social History     Tobacco Use    Smoking status: Never Smoker    Smokeless tobacco: Never Used   Vaping Use    Vaping Use: Never used   Substance Use Topics    Alcohol use: No    Drug use: No      Social History     Social History Narrative    PMH:    Medical Problems:    Endometriosis, Raynauds, Migraine    Surgical Hx:    Appendectomy, Removal of Gallbladder Laparoscopy - Endometriosis    Hernia Repair - Hiatal    Lt ankle -- several surgeries, cartilage removal    Reviewed, no changes. FH:    Father:    Seasonal Allergies, Heart Disease. Mother:    Heart Disease. paternal cousin ovarian cancer in 42's;    maternal aunt berry aneurysm    maternal grandmother brain tumor    Reviewed, no changes. SH:    Marital: Single. Work Status: Full-Time Employment - - [employer]. Personal Habits: Cigarette Use: Nonsmoker. Alcohol: Never used alcohol. Vitals:    07/11/22 1502   BP: 122/70   Pulse: 92   Temp: 97.8 °F (36.6 °C)   SpO2: 95%   Weight: 178 lb (80.7 kg)     Wt Readings from Last 3 Encounters:   07/11/22 178 lb (80.7 kg)   07/08/22 176 lb (79.8 kg)   06/04/22 173 lb (78.5 kg)        Physical Exam    Exam:  Const: Appears comfortable. No signs of acute distress present. Head/Face: Atraumatic, normocephalic on inspection. Eyes: No discharge from the eyes. Sclerae clear. ENMT: Ears clear nose clear oropharynx erythematous with few blisters noted and coating on tongue possible thrush  Neck: Supple. Palpation reveals no adenopathy. No masses appreciated. No JVD. Resp: Respirations are unlabored. Clear to auscultation bilaterally. No rales, rhonchi or wheezes appreciated over the  lungs bilaterally. CV: RRR   Extremities: No clubbing or cyanosis. No edema of the lower limbs  bilaterally. No calf inflammation or tenderness. Abdomen: Abdomen is soft, nontender, and nondistended. No abdominal masses  appreciated. No palpable hepatosplenomegaly. Bowel sounds are normoactive. Skin: Dry and warm with no rash. Muscular skeletal: No acute joint inflammation. Neuro:Grossly intact without focal deficit      Office Labs This Visit :  No results found for this visit on 07/11/22. Assessment and Plan:    1. Pharyngitis, unspecified etiology  Most likely viral versus thrush but other differential reviewed. Check throat culture.   Defers blood work.Counseled extensively. Differential reviewed, including serious etiologies. She decided not to take the medicines prescribed in [facility] including amoxicillin Magic mouthwash and Medrol Dosepak. We recommended probiotic and nystatin otherwise pending culture. As long as symptoms steadily improve/resolve follow-up 2 weeks sooner as needed    - Culture, Throat; Future  - nystatin (MYCOSTATIN) 412893 UNIT/ML suspension; 5mL PO QID  swish and swallow x up to 14 days. Retain in mouth as long as possible before swallowing  Dispense: 280 mL; Refill: 0      No problem-specific Assessment & Plan notes found for this encounter. Plan as above. Counseled extensively and differential diagnoses relevant to above were reviewed, including serious etiologies, risks and complications, especially of left uncontrolled. If relevant, instructions and  alternatives to meds/treatment reviewed, as well as interactions, and  SE's/ADRs reviewed, notify immediately if any, discontinuing new meds if any. Plan made after discussion and shared decision making. As long as symptoms steadily improve/resolve, and medical conditions follow the expected course, FU as below, sooner PRN. Return in about 2 weeks (around 7/25/2022), or if symptoms worsen or fail to improve. Educational materials and/or home exercises printed for patient's review and were included in patient instructions on his/her After Visit Summary and given to patient at the end of visit. After discussion, patient and/or guardian verbalizes understanding, agrees, feels comfortable with and wishes to proceed with above treatment plan. Call for any pending results, FU sooner if abnormal, as needed or if any current symptoms persist/worsen. Advised patient to call with any new medication issues, and read all Rx info from pharmacy to assure aware of all possible risks and side effects of medication before taking.      Reviewed age and gender appropriate health screening exams and vaccinations. Advised patient regarding importance of keeping up with recommended health maintenance and to schedule as soon as possible if overdue, as this is important in assessing for undiagnosed pathology, especially cancer, as well as protecting against potentially harmful/life threatening disease. Patient and/or guardian verbalizes understanding and agrees with above counseling, assessment and plan. All questions answered. Signs and symptoms to watch for discussed, serious signs and symptoms reviewed. ER if any. Rosibel Romo MD    Patients are advised to check with insurance company to ensure coverage and to fully understand benefits and cost prior to any testing. This note was created with the assistance of voice recognition software. Document was reviewed however may contain grammatical errors.

## 2022-07-14 LAB — THROAT CULTURE: NORMAL

## 2022-07-19 ENCOUNTER — OFFICE VISIT (OUTPATIENT)
Dept: PODIATRY | Age: 52
End: 2022-07-19
Payer: COMMERCIAL

## 2022-07-19 VITALS
SYSTOLIC BLOOD PRESSURE: 118 MMHG | TEMPERATURE: 97.6 F | WEIGHT: 178 LBS | BODY MASS INDEX: 28.73 KG/M2 | DIASTOLIC BLOOD PRESSURE: 64 MMHG

## 2022-07-19 DIAGNOSIS — L60.0 INGROWN NAIL: ICD-10-CM

## 2022-07-19 DIAGNOSIS — M79.675 PAIN IN LEFT TOE(S): ICD-10-CM

## 2022-07-19 DIAGNOSIS — B35.1 TINEA UNGUIUM: Primary | ICD-10-CM

## 2022-07-19 DIAGNOSIS — M79.674 PAIN IN TOE OF RIGHT FOOT: ICD-10-CM

## 2022-07-19 PROCEDURE — 99213 OFFICE O/P EST LOW 20 MIN: CPT | Performed by: PODIATRIST

## 2022-07-19 RX ORDER — TERBINAFINE HYDROCHLORIDE 250 MG/1
250 TABLET ORAL DAILY
Qty: 60 TABLET | Refills: 0 | Status: SHIPPED
Start: 2022-07-19 | End: 2022-09-13

## 2022-07-19 NOTE — PROGRESS NOTES
22  Gunnar Mcguire : 1970 Sex: female  Age: 45 y.o. Patient was referred by Simba Hanson MD    Chief Complaint   Patient presents with    Foot Injury     Left foot fx out of post op shoe     Toe Pain     Nail issue toenails are red thick and discolored          SUBJECTIVE patient is seen today for follow-up of fifth digit fracture this is asymptomatic patient is here for mycotic nails that are painful. HPI  Review of Systems  Const: Denies constitutional symptoms  Musculo: Denies symptoms other than stated above  Skin: Denies symptoms other than stated above       Current Outpatient Medications:     terbinafine (LAMISIL) 250 MG tablet, Take 1 tablet by mouth in the morning., Disp: 60 tablet, Rfl: 0    nystatin (MYCOSTATIN) 309163 UNIT/ML suspension, 5mL PO QID  swish and swallow x up to 14 days. Retain in mouth as long as possible before swallowing, Disp: 280 mL, Rfl: 0    metroNIDAZOLE (METROCREAM) 0.75 % cream, APPLY TOPICALLY TO FACE EVERY DAY IN THE MORNING, Disp: , Rfl:     ondansetron (ZOFRAN-ODT) 4 MG disintegrating tablet, Take 1 tablet by mouth 3 times daily as needed for Nausea or Vomiting, Disp: 21 tablet, Rfl: 0    SUMAtriptan (IMITREX) 50 MG tablet, 1/2-1 po at onset of migraine PRN, may repeat x 1 in 2hrs, Disp: 9 tablet, Rfl: 3    lidocaine viscous hcl (XYLOCAINE) 2 % SOLN solution, , Disp: , Rfl:     methylPREDNISolone (MEDROL DOSEPACK) 4 MG tablet, Take by mouth.  (Patient not taking: No sig reported), Disp: 1 kit, Rfl: 0    Magic Mouthwash (MIRACLE MOUTHWASH), Swish and swallow 5 mLs 4 times daily as needed for Irritation Equal parts viscous lidocaine, diphenhydramine, and nystatin (Patient not taking: No sig reported), Disp: 237 mL, Rfl: 0  Allergies   Allergen Reactions    Morphine      Reaction is reddened skin and patient \"blacks out\"    Oxycodone Anxiety    Tomato Anaphylaxis    Alprazolam      Reaction is hallucinations  hallucinations    Hydrocodone-Acetaminophen GI upset    Influenza Vaccines      ?  Neuro issue and neurologist said not to get again    Oseltamivir     Oxycontin [Oxycodone Hcl]      Reaction is reddened skin and patient \"blacks out\"    Tamiflu [Oseltamivir Phosphate]      Reaction is hallucinations    Topiramate      Weakness, memory loss, nausea/emesis, sweats    Adhesive Tape Rash     Reaction is sloughing of skin  Skin comes off    Oseltamivir Phosphate Nausea And Vomiting       Past Medical History:   Diagnosis Date    Blood in urine     history of    Constipation     Endometriosis     H/O superficial phlebitis 4/29/2021    Migraines     Raynaud's disease     Superficial phlebitis of leg, right 9/30/2020    Varicose veins of leg with pain, right 9/30/2020     Past Surgical History:   Procedure Laterality Date    ANKLE SURGERY      APPENDECTOMY      CHOLECYSTECTOMY      CYSTOSCOPY      HERNIA REPAIR      LAPAROSCOPY      multiple for endometriosis    TONSILLECTOMY       Family History   Problem Relation Age of Onset    Heart Disease Mother     Thyroid Disease Mother     Other Mother         kidney problems    Heart Disease Father     Allergy (Severe) Father         seasonal    Other Maternal Grandmother         brain tumor    Other Maternal Aunt         aneurysm    Cancer Paternal Cousin         ovarian      Social History     Socioeconomic History    Marital status: Single     Spouse name: Not on file    Number of children: Not on file    Years of education: Not on file    Highest education level: Not on file   Occupational History    Not on file   Tobacco Use    Smoking status: Never    Smokeless tobacco: Never   Vaping Use    Vaping Use: Never used   Substance and Sexual Activity    Alcohol use: No    Drug use: No    Sexual activity: Not on file   Other Topics Concern    Not on file   Social History Narrative    PMH:    Medical Problems:    Endometriosis, Raynauds, Migraine    Surgical Hx:    Appendectomy, Removal of Gallbladder    Laparoscopy - Endometriosis    Hernia Repair - Hiatal    Lt ankle -- several surgeries, cartilage removal    Reviewed, no changes. FH:    Father:    Seasonal Allergies, Heart Disease. Mother:    Heart Disease. paternal cousin ovarian cancer in 42's;    maternal aunt berry aneurysm    maternal grandmother brain tumor    Reviewed, no changes. SH:    Marital: Single. Work Status: Full-Time Employment - - [employer]. Personal Habits: Cigarette Use: Nonsmoker. Alcohol: Never used alcohol.      Social Determinants of Health     Financial Resource Strain: Low Risk     Difficulty of Paying Living Expenses: Not hard at all   Food Insecurity: No Food Insecurity    Worried About Running Out of Food in the Last Year: Never true    Ran Out of Food in the Last Year: Never true   Transportation Needs: Not on file   Physical Activity: Not on file   Stress: Not on file   Social Connections: Not on file   Intimate Partner Violence: Not on file   Housing Stability: Not on file       Vitals:    07/19/22 1020   BP: 118/64   Temp: 97.6 °F (36.4 °C)   TempSrc: Temporal   Weight: 178 lb (80.7 kg)       Focused Lower Extremity Physical Exam:  Vitals:    07/19/22 1020   BP: 118/64   Temp: 97.6 °F (36.4 °C)        Foot Exam     Ortho Exam    Vascular: pulses  dp  pt    Capillary Refill Time:   Hair growth  Skin:    Edema:    Neurologic:      Musculoskeletal/ Orthopedic examination:    NAIL Toenail Description  Sites of Onychomycosis Involvement (Check affected area)  [x] [x] [x] [x] [x] [x] [x] [x] [x] [x]  5 4 3 2 1 1 2 3 4 5                          Right                                        Left    Thickness  [x] [x] [x] [x] [x] [x] [x] [x] [x] [x]  5 4 3 2 1 1 2 3 4 5                         Right                                        Left    Dystrophic Changes   [x] [x] [x] [x] [x] [x] [x] [x] [x] [x]  5 4 3 2 1 1 2 3 4 5                         Right                                        Left    discolored [x] [x] [x] [x] [x] [x] [x] [x] [x] [x]  5 4 3 2 1 1 2 3 4 5                          Right                                        Left    Incurvation/Ingrowin   [] [] [] [] [] [] [] [] [] []  5 4 3 2 1 1 2 3 4 5                         Right                                        Left    Inflammation/Pain   [x] [x] [x] [x] [x] [x] [x] [x] [x] [x]  5 4 3 2 1 1 2 3 4 5                         Right                                        Left   Web space  Derm:          Assessment and Plan: Today the patient was placed on Lamisil blood work was ordered 60 days   Idamae Door was seen today for foot injury and toe pain. Diagnoses and all orders for this visit:    Tinea unguium  -     Hepatic Function Panel; Future    Pain in left toe(s)    Ingrown nail    Pain in toe of right foot    Other orders  -     terbinafine (LAMISIL) 250 MG tablet; Take 1 tablet by mouth in the morning. No follow-ups on file. Seen By:  Chase Moran DPM      Document was created using voice recognition software. Note was reviewed, however may contain grammatical errors.

## 2022-07-26 ENCOUNTER — OFFICE VISIT (OUTPATIENT)
Dept: PRIMARY CARE CLINIC | Age: 52
End: 2022-07-26
Payer: COMMERCIAL

## 2022-07-26 VITALS
HEART RATE: 59 BPM | TEMPERATURE: 97.6 F | DIASTOLIC BLOOD PRESSURE: 72 MMHG | WEIGHT: 178 LBS | OXYGEN SATURATION: 98 % | RESPIRATION RATE: 18 BRPM | BODY MASS INDEX: 28.61 KG/M2 | SYSTOLIC BLOOD PRESSURE: 104 MMHG | HEIGHT: 66 IN

## 2022-07-26 DIAGNOSIS — B37.0 THRUSH: ICD-10-CM

## 2022-07-26 DIAGNOSIS — K12.1 STOMATITIS: ICD-10-CM

## 2022-07-26 DIAGNOSIS — D04.39 SQUAMOUS CELL CARCINOMA IN SITU (SCCIS) OF SKIN OF FOREHEAD: ICD-10-CM

## 2022-07-26 DIAGNOSIS — K12.1 STOMATITIS: Primary | ICD-10-CM

## 2022-07-26 LAB
BASOPHILS ABSOLUTE: 0.05 E9/L (ref 0–0.2)
BASOPHILS RELATIVE PERCENT: 0.8 % (ref 0–2)
EOSINOPHILS ABSOLUTE: 0.22 E9/L (ref 0.05–0.5)
EOSINOPHILS RELATIVE PERCENT: 3.7 % (ref 0–6)
HCT VFR BLD CALC: 43.9 % (ref 34–48)
HEMOGLOBIN: 13.6 G/DL (ref 11.5–15.5)
IMMATURE GRANULOCYTES #: 0.02 E9/L
IMMATURE GRANULOCYTES %: 0.3 % (ref 0–5)
LYMPHOCYTES ABSOLUTE: 2.1 E9/L (ref 1.5–4)
LYMPHOCYTES RELATIVE PERCENT: 35.2 % (ref 20–42)
MCH RBC QN AUTO: 28.6 PG (ref 26–35)
MCHC RBC AUTO-ENTMCNC: 31 % (ref 32–34.5)
MCV RBC AUTO: 92.2 FL (ref 80–99.9)
MONOCYTES ABSOLUTE: 0.43 E9/L (ref 0.1–0.95)
MONOCYTES RELATIVE PERCENT: 7.2 % (ref 2–12)
NEUTROPHILS ABSOLUTE: 3.15 E9/L (ref 1.8–7.3)
NEUTROPHILS RELATIVE PERCENT: 52.8 % (ref 43–80)
PDW BLD-RTO: 13.7 FL (ref 11.5–15)
PLATELET # BLD: 240 E9/L (ref 130–450)
PMV BLD AUTO: 9.9 FL (ref 7–12)
RBC # BLD: 4.76 E12/L (ref 3.5–5.5)
WBC # BLD: 6 E9/L (ref 4.5–11.5)

## 2022-07-26 PROCEDURE — 99214 OFFICE O/P EST MOD 30 MIN: CPT | Performed by: FAMILY MEDICINE

## 2022-07-26 NOTE — PROGRESS NOTES
Abhay Dallas : 1970 Sex: female  Age: 45 y.o. Chief Complaint   Patient presents with    URI     2 week follow up     Mouth Lesions       HPI  HPI:      Patient presents today for follow-up. Feeling better with still some sores in her mouth, some of, some of gone. Misses some of the nystatin. Throat culture is negative. She did have a squamous cell removed from her right forehead and she is worried these lesions could be related. She sees dermatology again in September. No fever chills malaise chest pain shortness of breath or abdominal symptoms. No other complaints or concerns      ROS:  As above      Current Outpatient Medications:     terbinafine (LAMISIL) 250 MG tablet, Take 1 tablet by mouth in the morning., Disp: 60 tablet, Rfl: 0    nystatin (MYCOSTATIN) [id number] UNIT/ML suspension, 5mL PO QID  swish and swallow x up to 14 days. Retain in mouth as long as possible before swallowing, Disp: 280 mL, Rfl: 0    metroNIDAZOLE (METROCREAM) 0.75 % cream, APPLY TOPICALLY TO FACE EVERY DAY IN THE MORNING, Disp: , Rfl:     Magic Mouthwash (MIRACLE MOUTHWASH), Swish and swallow 5 mLs 4 times daily as needed for Irritation Equal parts viscous lidocaine, diphenhydramine, and nystatin, Disp: 237 mL, Rfl: 0    ondansetron (ZOFRAN-ODT) 4 MG disintegrating tablet, Take 1 tablet by mouth 3 times daily as needed for Nausea or Vomiting, Disp: 21 tablet, Rfl: 0    SUMAtriptan (IMITREX) 50 MG tablet, 1/2-1 po at onset of migraine PRN, may repeat x 1 in 2hrs, Disp: 9 tablet, Rfl: 3    lidocaine viscous hcl (XYLOCAINE) 2 % SOLN solution, , Disp: , Rfl:   Allergies   Allergen Reactions    Morphine      Reaction is reddened skin and patient \"blacks out\"    Oxycodone Anxiety    Tomato Anaphylaxis    Alprazolam      Reaction is hallucinations  hallucinations    Hydrocodone-Acetaminophen      GI upset    Influenza Vaccines      ?  Neuro issue and neurologist said not to get again    Oseltamivir     Oxycontin [Oxycodone Hcl]      Reaction is reddened skin and patient \"blacks out\"    Tamiflu [Oseltamivir Phosphate]      Reaction is hallucinations    Topiramate      Weakness, memory loss, nausea/emesis, sweats    Adhesive Tape Rash     Reaction is sloughing of skin  Skin comes off    Oseltamivir Phosphate Nausea And Vomiting       Past Medical History:   Diagnosis Date    Blood in urine     history of    Constipation     Endometriosis     H/O superficial phlebitis 4/29/2021    Migraines     Raynaud's disease     Superficial phlebitis of leg, right 9/30/2020    Varicose veins of leg with pain, right 9/30/2020     Past Surgical History:   Procedure Laterality Date    ANKLE SURGERY      APPENDECTOMY      CHOLECYSTECTOMY      CYSTOSCOPY      HERNIA REPAIR      LAPAROSCOPY      multiple for endometriosis    TONSILLECTOMY       Family History   Problem Relation Age of Onset    Heart Disease Mother     Thyroid Disease Mother     Other Mother         kidney problems    Heart Disease Father     Allergy (Severe) Father         seasonal    Other Maternal Grandmother         brain tumor    Other Maternal Aunt         aneurysm    Cancer Paternal Cousin         ovarian      Social History     Tobacco Use    Smoking status: Never    Smokeless tobacco: Never   Vaping Use    Vaping Use: Never used   Substance Use Topics    Alcohol use: No    Drug use: No      Social History     Social History Narrative    PMH:    Medical Problems:    Endometriosis, Raynauds, Migraine    Surgical Hx:    Appendectomy, Removal of Gallbladder    Laparoscopy - Endometriosis    Hernia Repair - Hiatal    Lt ankle -- several surgeries, cartilage removal    Reviewed, no changes. FH:    Father:    Seasonal Allergies, Heart Disease. Mother:    Heart Disease. paternal cousin ovarian cancer in 42's;    maternal aunt berry aneurysm    maternal grandmother brain tumor    Reviewed, no changes. SH:    Marital: Single. Work Status: Full-Time Employment - - [employer]. Personal Habits: Cigarette Use: Nonsmoker. Alcohol: Never used alcohol. Vitals:    07/26/22 1123   BP: 104/72   Pulse: 59   Resp: 18   Temp: 97.6 °F (36.4 °C)   TempSrc: Temporal   SpO2: 98%   Weight: 178 lb (80.7 kg)   Height: 5' 6\" (1.676 m)     Wt Readings from Last 3 Encounters:   07/26/22 178 lb (80.7 kg)   07/19/22 178 lb (80.7 kg)   07/11/22 178 lb (80.7 kg)        Physical Exam    Exam:  Const: Appears comfortable. No signs of acute distress present. Head/Face: Atraumatic, normocephalic on inspection. Eyes: No discharge from the eyes. Sclerae clear. ENMT: Ears clear nose clear oropharynx-previous evidence of thrush has resolved, few small canker looking sores, 1 left cheek 1 posterior right cheek, looks like where teeth rub but she states this is new. She would like to see ENT. She has viscous lidocaine to use as needed. Finish nystatin as directed. No exudate swelling or asymmetry    Neck: Supple. Palpation reveals no adenopathy. No masses appreciated. No JVD. Resp: Respirations are unlabored. Clear to auscultation bilaterally. No rales, rhonchi or wheezes appreciated over the  lungs bilaterally. CV: RRR   Extremities: No clubbing or cyanosis. No edema of the lower limbs  bilaterally. No calf inflammation or tenderness. Abdomen: Abdomen is soft, nontender, and nondistended. No abdominal masses  appreciated. No palpable hepatosplenomegaly. Bowel sounds are normoactive. Skin: Dry and warm with no rash. Scar right forehead from squamous cell resection  Muscular skeletal: No acute joint inflammation. Neuro:Grossly intact without focal deficit      Office Labs This Visit :  No results found for this visit on 07/26/22. Assessment and Plan:    1. Stomatitis  Overall improved. Still present though. I wonder if there can be a dental issue, she says this is new-encouraged her to see dentist.  She would like to see ENT. Referred. Check CBC coxsackie EBV.   Throat culture is negative. Finish nystatin as directed. Check zinc.  Counseled on use of short-term zinc and L-lysine. Discussed biopsying. Follow-up 2 weeks or as needed    2. Thrush  Improved/resolved. Finish nystatin as prescribed    3. 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 Signs and symptoms watch were reviewed. She is worried for mouth lesions being representative. Refer to ENT. No problem-specific Assessment & Plan notes found for this encounter. Plan as above. Counseled extensively and differential diagnoses relevant to above were reviewed, including serious etiologies, risks and complications, especially of left uncontrolled. If relevant, instructions and  alternatives to meds/treatment reviewed, as well as interactions, and  SE's/ADRs reviewed, notify immediately if any, discontinuing new meds if any. Plan made after discussion and shared decision making. As above. She is going to consider short-term zinc/L-lysine. Finish nystatin. Check blood work. Follow-up 2 weeks or as needed. Refer to ENT. See dentist.        As long as symptoms steadily improve/resolve, and medical conditions follow the expected course, FU as below, sooner PRN. Return in about 2 weeks (around 8/9/2022). Educational materials and/or home exercises printed for patient's review and were included in patient instructions on his/her After Visit Summary and given to patient at the end of visit. After discussion, patient and/or guardian verbalizes understanding, agrees, feels comfortable with and wishes to proceed with above treatment plan. Call for any pending results, FU sooner if abnormal, as needed or if any current symptoms persist/worsen.       Advised patient to call with any new medication issues, and read all Rx info from pharmacy to assure aware of all possible risks and side effects of medication before taking. Reviewed age and gender appropriate health screening exams and vaccinations. Advised patient regarding importance of keeping up with recommended health maintenance and to schedule as soon as possible if overdue, as this is important in assessing for undiagnosed pathology, especially cancer, as well as protecting against potentially harmful/life threatening disease. Patient and/or guardian verbalizes understanding and agrees with above counseling, assessment and plan. All questions answered. Over 30 minutes  spent with the patient in reviewing records, reviewing with patient/family, counseling, ordering,  prescribing, completing h&p, etc., with over 50% of the time spent face to face counseling. Signs and symptoms to watch for discussed, serious signs and symptoms reviewed. ER if any. Claudell Aguas, MD    Patients are advised to check with insurance company to ensure coverage and to fully understand benefits and cost prior to any testing. This note was created with the assistance of voice recognition software. Document was reviewed however may contain grammatical errors.

## 2022-08-01 LAB
COXSACKIE A9 AB: NORMAL
ZINC: 74.2 UG/DL (ref 60–120)

## 2022-08-02 LAB
EPSTEIN BARR VIRUS NUCLEAR AB IGG: 474 U/ML (ref 0–21.9)
EPSTEIN-BARR EARLY ANTIGEN ANTIBODY: >150 U/ML (ref 0–10.9)
EPSTEIN-BARR VCA IGG: >750 U/ML (ref 0–21.9)
EPSTEIN-BARR VCA IGM: <10 U/ML (ref 0–43.9)

## 2022-08-06 LAB
COXSACKIE B1 ANTIBODY: ABNORMAL
COXSACKIE B2 ANTIBODY: ABNORMAL
COXSACKIE B3 ANTIBODY: ABNORMAL
COXSACKIE B4 ANTIBODY: ABNORMAL
COXSACKIE B5 ANTIBODY: ABNORMAL
COXSACKIE B6 ANTIBODY: ABNORMAL

## 2022-08-08 NOTE — RESULT ENCOUNTER NOTE
Possible recent hand/foot/mouth type virus. Sx management. Fu, sooner prn.  Express if any minor concerns while im out of town, er if any serious s/s

## 2022-08-30 ENCOUNTER — OFFICE VISIT (OUTPATIENT)
Dept: PRIMARY CARE CLINIC | Age: 52
End: 2022-08-30
Payer: COMMERCIAL

## 2022-08-30 VITALS
RESPIRATION RATE: 16 BRPM | BODY MASS INDEX: 29.89 KG/M2 | TEMPERATURE: 97.5 F | HEIGHT: 66 IN | SYSTOLIC BLOOD PRESSURE: 118 MMHG | OXYGEN SATURATION: 98 % | DIASTOLIC BLOOD PRESSURE: 80 MMHG | HEART RATE: 69 BPM | WEIGHT: 186 LBS

## 2022-08-30 DIAGNOSIS — K11.20 PAROTIDITIS: Primary | ICD-10-CM

## 2022-08-30 DIAGNOSIS — B37.0 THRUSH: ICD-10-CM

## 2022-08-30 DIAGNOSIS — J01.90 ACUTE BACTERIAL SINUSITIS: ICD-10-CM

## 2022-08-30 DIAGNOSIS — B96.89 ACUTE BACTERIAL SINUSITIS: ICD-10-CM

## 2022-08-30 PROCEDURE — 99214 OFFICE O/P EST MOD 30 MIN: CPT | Performed by: FAMILY MEDICINE

## 2022-08-30 RX ORDER — CLOTRIMAZOLE 10 MG/1
LOZENGE ORAL; TOPICAL
Qty: 70 TROCHE | Refills: 0 | Status: SHIPPED
Start: 2022-08-30 | End: 2022-09-13

## 2022-08-30 RX ORDER — AMOXICILLIN AND CLAVULANATE POTASSIUM 875; 125 MG/1; MG/1
1 TABLET, FILM COATED ORAL 2 TIMES DAILY
Qty: 20 TABLET | Refills: 0 | Status: SHIPPED | OUTPATIENT
Start: 2022-08-30 | End: 2022-09-09

## 2022-08-30 NOTE — PROGRESS NOTES
Mary Cushing : 1970 Sex: female  Age: 45 y.o. Chief Complaint   Patient presents with    Pharyngitis     No improvement on sore throat since last visit in July, no ENT til end of October       HPI  HPI:      Patient presents today with persistent symptoms. Varies. Sometimes feels like sores in her throat, this resolved. Sometimes she is discoloration where it looks pale in her throat other times yellow. A coating in her throat at times. Feels a coating. Now has sinus pressure thick rhinorrhea postnasal drainage. Took nystatin somewhat inconsistently. Worried about her history of squamous cell from her right forehead. Feels some pain in her neck under her jaw at times and cervical chain area. No fever chills malaise. No other complaints concerns. Sees ENT October      ROS:  As above      Current Outpatient Medications:     amoxicillin-clavulanate (AUGMENTIN) 875-125 MG per tablet, Take 1 tablet by mouth 2 times daily for 10 days, Disp: 20 tablet, Rfl: 0    clotrimazole (MYCELEX) 10 MG callie, Slowly dissolve 1 in mouth 5 times per day x 14 days, Disp: 70 Callie, Rfl: 0    terbinafine (LAMISIL) 250 MG tablet, Take 1 tablet by mouth in the morning., Disp: 60 tablet, Rfl: 0    metroNIDAZOLE (METROCREAM) 0.75 % cream, APPLY TOPICALLY TO FACE EVERY DAY IN THE MORNING, Disp: , Rfl:     SUMAtriptan (IMITREX) 50 MG tablet, 1/2-1 po at onset of migraine PRN, may repeat x 1 in 2hrs, Disp: 9 tablet, Rfl: 3    lidocaine viscous hcl (XYLOCAINE) 2 % SOLN solution, , Disp: , Rfl:     nystatin (MYCOSTATIN) [id number] UNIT/ML suspension, 5mL PO QID  swish and swallow x up to 14 days.  Retain in mouth as long as possible before swallowing (Patient not taking: Reported on 2022), Disp: 280 mL, Rfl: 0    Magic Mouthwash (MIRACLE MOUTHWASH), Swish and swallow 5 mLs 4 times daily as needed for Irritation Equal parts viscous lidocaine, diphenhydramine, and nystatin (Patient not taking: Reported on 2022), Disp: 237 mL, Rfl: 0    ondansetron (ZOFRAN-ODT) 4 MG disintegrating tablet, Take 1 tablet by mouth 3 times daily as needed for Nausea or Vomiting (Patient not taking: Reported on 8/30/2022), Disp: 21 tablet, Rfl: 0  Allergies   Allergen Reactions    Morphine      Reaction is reddened skin and patient \"blacks out\"    Oxycodone Anxiety    Tomato Anaphylaxis    Alprazolam      Reaction is hallucinations  hallucinations    Hydrocodone-Acetaminophen      GI upset    Influenza Vaccines      ?  Neuro issue and neurologist said not to get again    Oseltamivir     Oxycontin [Oxycodone Hcl]      Reaction is reddened skin and patient \"blacks out\"    Tamiflu [Oseltamivir Phosphate]      Reaction is hallucinations    Topiramate      Weakness, memory loss, nausea/emesis, sweats    Adhesive Tape Rash     Reaction is sloughing of skin  Skin comes off    Oseltamivir Phosphate Nausea And Vomiting       Past Medical History:   Diagnosis Date    Blood in urine     history of    Constipation     Endometriosis     H/O superficial phlebitis 4/29/2021    Migraines     Raynaud's disease     Superficial phlebitis of leg, right 9/30/2020    Varicose veins of leg with pain, right 9/30/2020     Past Surgical History:   Procedure Laterality Date    ANKLE SURGERY      APPENDECTOMY      CHOLECYSTECTOMY      CYSTOSCOPY      HERNIA REPAIR      LAPAROSCOPY      multiple for endometriosis    TONSILLECTOMY       Family History   Problem Relation Age of Onset    Heart Disease Mother     Thyroid Disease Mother     Other Mother         kidney problems    Heart Disease Father     Allergy (Severe) Father         seasonal    Other Maternal Grandmother         brain tumor    Other Maternal Aunt         aneurysm    Cancer Paternal Cousin         ovarian      Social History     Tobacco Use    Smoking status: Never    Smokeless tobacco: Never   Vaping Use    Vaping Use: Never used   Substance Use Topics    Alcohol use: No    Drug use: No      Social History SE's/ADRs reviewed, notify immediately if any, discontinuing new meds if any. Plan made after discussion and shared decision making. As above. Await ENT. Mycelex. Augmentin. Check neck ultrasound. Follow-up 2 weeks sooner as needed. As long as symptoms steadily improve/resolve, and medical conditions follow the expected course, FU as below, sooner PRN. Return in about 2 weeks (around 9/13/2022). Educational materials and/or home exercises printed for patient's review and were included in patient instructions on his/her After Visit Summary and given to patient at the end of visit. After discussion, patient and/or guardian verbalizes understanding, agrees, feels comfortable with and wishes to proceed with above treatment plan. Call for any pending results, FU sooner if abnormal, as needed or if any current symptoms persist/worsen. Advised patient to call with any new medication issues, and read all Rx info from pharmacy to assure aware of all possible risks and side effects of medication before taking. Reviewed age and gender appropriate health screening exams and vaccinations. Advised patient regarding importance of keeping up with recommended health maintenance and to schedule as soon as possible if overdue, as this is important in assessing for undiagnosed pathology, especially cancer, as well as protecting against potentially harmful/life threatening disease. Patient and/or guardian verbalizes understanding and agrees with above counseling, assessment and plan. All questions answered. Over 30 minutes  spent with the patient in reviewing records, reviewing with patient/family, counseling, ordering,  prescribing, completing h&p, etc., with over 50% of the time spent face to face counseling. Signs and symptoms to watch for discussed, serious signs and symptoms reviewed. ER if any.             Emma Alcantara MD    Patients are advised to check with insurance company to ensure coverage and to fully understand benefits and cost prior to any testing. This note was created with the assistance of voice recognition software. Document was reviewed however may contain grammatical errors.

## 2022-09-13 ENCOUNTER — OFFICE VISIT (OUTPATIENT)
Dept: PRIMARY CARE CLINIC | Age: 52
End: 2022-09-13
Payer: COMMERCIAL

## 2022-09-13 VITALS
HEIGHT: 66 IN | HEART RATE: 61 BPM | WEIGHT: 183 LBS | DIASTOLIC BLOOD PRESSURE: 80 MMHG | OXYGEN SATURATION: 99 % | RESPIRATION RATE: 20 BRPM | SYSTOLIC BLOOD PRESSURE: 122 MMHG | BODY MASS INDEX: 29.41 KG/M2 | TEMPERATURE: 97.6 F

## 2022-09-13 DIAGNOSIS — G43.811 OTHER MIGRAINE WITH STATUS MIGRAINOSUS, INTRACTABLE: ICD-10-CM

## 2022-09-13 DIAGNOSIS — B96.89 ACUTE BACTERIAL SINUSITIS: ICD-10-CM

## 2022-09-13 DIAGNOSIS — J01.90 ACUTE BACTERIAL SINUSITIS: ICD-10-CM

## 2022-09-13 DIAGNOSIS — D04.39 SQUAMOUS CELL CARCINOMA IN SITU (SCCIS) OF SKIN OF FOREHEAD: ICD-10-CM

## 2022-09-13 DIAGNOSIS — E04.9 GOITER: Primary | ICD-10-CM

## 2022-09-13 DIAGNOSIS — B37.0 THRUSH: ICD-10-CM

## 2022-09-13 DIAGNOSIS — K12.1 STOMATITIS: ICD-10-CM

## 2022-09-13 DIAGNOSIS — D49.7 PITUITARY NEOPLASM: ICD-10-CM

## 2022-09-13 PROCEDURE — 99214 OFFICE O/P EST MOD 30 MIN: CPT | Performed by: FAMILY MEDICINE

## 2022-09-13 RX ORDER — SUMATRIPTAN 50 MG/1
TABLET, FILM COATED ORAL
Qty: 9 TABLET | Refills: 3 | Status: SHIPPED | OUTPATIENT
Start: 2022-09-13

## 2022-09-13 RX ORDER — ONDANSETRON 4 MG/1
4 TABLET, ORALLY DISINTEGRATING ORAL 3 TIMES DAILY PRN
Qty: 21 TABLET | Refills: 1 | Status: SHIPPED | OUTPATIENT
Start: 2022-09-13
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
## 2022-10-04 ENCOUNTER — OFFICE VISIT (OUTPATIENT)
Dept: PRIMARY CARE CLINIC | Age: 52
End: 2022-10-04
Payer: COMMERCIAL

## 2022-10-04 ENCOUNTER — TELEPHONE (OUTPATIENT)
Dept: PRIMARY CARE CLINIC | Age: 52
End: 2022-10-04

## 2022-10-04 VITALS
WEIGHT: 183 LBS | BODY MASS INDEX: 29.41 KG/M2 | HEIGHT: 66 IN | SYSTOLIC BLOOD PRESSURE: 124 MMHG | TEMPERATURE: 97.4 F | DIASTOLIC BLOOD PRESSURE: 86 MMHG | HEART RATE: 64 BPM | RESPIRATION RATE: 20 BRPM | OXYGEN SATURATION: 99 %

## 2022-10-04 DIAGNOSIS — G43.811 OTHER MIGRAINE WITH STATUS MIGRAINOSUS, INTRACTABLE: ICD-10-CM

## 2022-10-04 DIAGNOSIS — D49.7 PITUITARY NEOPLASM: ICD-10-CM

## 2022-10-04 DIAGNOSIS — B37.0 THRUSH: ICD-10-CM

## 2022-10-04 DIAGNOSIS — Z00.00 HEALTH MAINTENANCE EXAMINATION: ICD-10-CM

## 2022-10-04 DIAGNOSIS — S61.012A LACERATION OF LEFT THUMB WITHOUT FOREIGN BODY WITHOUT DAMAGE TO NAIL, INITIAL ENCOUNTER: ICD-10-CM

## 2022-10-04 DIAGNOSIS — R07.0 THROAT PAIN: Primary | ICD-10-CM

## 2022-10-04 DIAGNOSIS — D04.39 SQUAMOUS CELL CARCINOMA IN SITU (SCCIS) OF SKIN OF FOREHEAD: ICD-10-CM

## 2022-10-04 PROCEDURE — 90471 IMMUNIZATION ADMIN: CPT | Performed by: FAMILY MEDICINE

## 2022-10-04 PROCEDURE — 90715 TDAP VACCINE 7 YRS/> IM: CPT | Performed by: FAMILY MEDICINE

## 2022-10-04 PROCEDURE — 99214 OFFICE O/P EST MOD 30 MIN: CPT | Performed by: FAMILY MEDICINE

## 2022-10-04 RX ORDER — ATENOLOL 25 MG/1
TABLET ORAL
COMMUNITY
Start: 2022-09-26

## 2022-10-04 NOTE — TELEPHONE ENCOUNTER
Patient called back with information regarding ENT referral.    Spoke with insurance and they provided 2 names. Dr. Saúl Antunez Ph. [phone]. When I looked this doctor up, it appears that he does facial plastic surgery- I did relay this to the patient that I am not sure if they would see her for her diagnosis of throat pain. The second provider was Dr. Harsha Gtz Ph. [phone]. She has [employer] and these providers are the closest to her per her insurance that are in network with her plan.

## 2022-10-04 NOTE — PROGRESS NOTES
Tahira Blanton : 1970 Sex: female  Age: 45 y.o. Chief Complaint   Patient presents with    Results     ultrasound       HPI  HPI:    Patient presents today for follow-up. Generally he is feeling better however still having some throat pain in the right lower part of her throat particularly when she talks too much. Ultrasound work was been negative so far. She canceled her appointment with Dr. Gretchen Wisdom because he was not on her insurance. She states it is least expensive to go to [facility] and prefers referral there. No sores, overall feeling better but would still like referral, no fever chills    Ultrasound negative    Yesterday cut her left thumb on register, non-Worker's Comp. Bleeding stopped. No fever chills drainage or otherwise    US HEAD NECK SOFT TISSUE THYROID    Result Date: 2022  EXAMINATION: THYROID ULTRASOUND 2022 COMPARISON: Thyroid ultrasound from 2022 HISTORY: ORDERING SYSTEM PROVIDED HISTORY: Goiter TECHNOLOGIST PROVIDED HISTORY: What reading provider will be dictating this exam?->CRC FINDINGS: Right thyroid lobe:  3.3 x 1.3 x 1.0 cm Left thyroid lobe:  3.5 x 1.4 x 0.9 cm Isthmus:  2 mm Thyroid Gland:  Thyroid gland demonstrates normal echotexture and vascularity. Nodules: Tiny colloid cyst in the right mid to lower pole measures 3 mm. No concerning thyroid nodules. Cervical lymphadenopathy: No abnormal lymph nodes in the imaged portions of the neck. Essentially normal thyroid ultrasound. No concerning nodules. Normal vascularity.  ACR TI-RADS recommendations: TR5 (>= 7 points):  FNA if >= 1 cm; follow-up if 0.5-0.9 cm in 1, 2, 3, 4, and 5 years TR4 (4-6 points):  FNA if >= 1.5 cm; follow-up if 1.0-1.4 cm in 1, 2, 3, and 5 years TR3 (3 points):  FNA if >= 2.5 cm; follow-up if 1.5-2.4 cm in 1, 3, and 5 years TR2 (2 points):  No FNA or follow-up TR1 (0 points):  No FNA or follow-up ACR TI-RADS recommends that no more than two nodules with the highest ACR TI-RADS point total should be biopsied and no more than four nodules should be followed. US HEAD NECK SOFT TISSUE THYROID    Result Date: 9/6/2022  EXAMINATION: ULTRASOUND OF THE THYROID WITH COLOR DOPPLER FLOW EVALUATION 9/6/2022 8:07 am COMPARISON: None. HISTORY: ORDERING SYSTEM PROVIDED HISTORY: Parotiditis TECHNOLOGIST PROVIDED HISTORY: Reason for exam:->sore areas around parotid and neck, r/o masses/LAD What reading provider will be dictating this exam?->CRC FINDINGS: The parotid glands are symmetric in appearance. There echotexture is homogeneous. No focal cystic or solid lesion is identified within either parotid gland. Immediately adjacent to both parotid glands are small, benign-appearing lymph nodes. The one on the right measures 1.0 x 0.4 x 0.9 cm. The one on the left measures 1.2 x 0.4 by 0.8 cm. Color evaluation of the lymph nodes reveals normal central hilar flow. No other significant surrounding soft tissue abnormality is seen. Unremarkable parotid gland ultrasound. Past blood work reviewed        ROS:  As above      Current Outpatient Medications:     mupirocin (BACTROBAN) 2 % ointment, Apply topically 3 times daily for 10 days, Disp: 22 g, Rfl: 0    ondansetron (ZOFRAN-ODT) 4 MG disintegrating tablet, Take 1 tablet by mouth 3 times daily as needed for Nausea or Vomiting, Disp: 21 tablet, Rfl: 1    atenolol (TENORMIN) 25 MG tablet, TAKE 1 TABLET BY MOUTH DAILY (Patient not taking: Reported on 10/4/2022), Disp: , Rfl:     SUMAtriptan (IMITREX) 50 MG tablet, 1/2-1 po at onset of migraine PRN, may repeat x 1 in 2hrs (Patient not taking: Reported on 10/4/2022), Disp: 9 tablet, Rfl: 3  Allergies   Allergen Reactions    Morphine      Reaction is reddened skin and patient \"blacks out\"    Oxycodone Anxiety    Tomato Anaphylaxis    Alprazolam      Reaction is hallucinations  hallucinations    Hydrocodone-Acetaminophen      GI upset    Influenza Vaccines      ?  Neuro issue and neurologist said not to get again    Oseltamivir     Oxycontin [Oxycodone Hcl]      Reaction is reddened skin and patient \"blacks out\"    Tamiflu [Oseltamivir Phosphate]      Reaction is hallucinations    Topiramate      Weakness, memory loss, nausea/emesis, sweats    Adhesive Tape Rash     Reaction is sloughing of skin  Skin comes off    Oseltamivir Phosphate Nausea And Vomiting       Past Medical History:   Diagnosis Date    Blood in urine     history of    Constipation     Endometriosis     H/O superficial phlebitis 4/29/2021    Migraines     Raynaud's disease     Superficial phlebitis of leg, right 9/30/2020    Varicose veins of leg with pain, right 9/30/2020     Past Surgical History:   Procedure Laterality Date    ANKLE SURGERY      APPENDECTOMY      CHOLECYSTECTOMY      CYSTOSCOPY      HERNIA REPAIR      LAPAROSCOPY      multiple for endometriosis    TONSILLECTOMY       Family History   Problem Relation Age of Onset    Heart Disease Mother     Thyroid Disease Mother     Other Mother         kidney problems    Heart Disease Father     Allergy (Severe) Father         seasonal    Other Maternal Grandmother         brain tumor    Other Maternal Aunt         aneurysm    Cancer Paternal Cousin         ovarian      Social History     Tobacco Use    Smoking status: Never    Smokeless tobacco: Never   Vaping Use    Vaping Use: Never used   Substance Use Topics    Alcohol use: No    Drug use: No      Social History     Social History Narrative    PMH:    Medical Problems:    Endometriosis, Raynauds, Migraine    Surgical Hx:    Appendectomy, Removal of Gallbladder    Laparoscopy - Endometriosis    Hernia Repair - Hiatal    Lt ankle -- several surgeries, cartilage removal    Reviewed, no changes. FH:    Father:    Seasonal Allergies, Heart Disease. Mother:    Heart Disease. paternal cousin ovarian cancer in 42's;    maternal aunt berry aneurysm    maternal grandmother brain tumor    Reviewed, no changes.     SH:    Marital: Single. Work Status: Full-Time Employment - - [employer]. Personal Habits: Cigarette Use: Nonsmoker. Alcohol: Never used alcohol. Vitals:    10/04/22 1106   BP: 124/86   Pulse: 64   Resp: 20   Temp: 97.4 °F (36.3 °C)   TempSrc: Temporal   SpO2: 99%   Weight: 183 lb (83 kg)   Height: 5' 6\" (1.676 m)     Wt Readings from Last 3 Encounters:   10/04/22 183 lb (83 kg)   09/13/22 183 lb (83 kg)   08/30/22 186 lb (84.4 kg)        Physical Exam    Exam:  Const: Appears comfortable. No signs of acute distress present. Head/Face: Atraumatic, normocephalic on inspection. Eyes: No discharge from the eyes. Sclerae clear. ENMT: Ears clear nose clear/boggy oropharynx watery postnasal drainage cobblestoning no exudate swelling or evidence of thrush at this time. Neck: Supple. Palpation reveals no adenopathy. No masses appreciated. Questionable goiter. Some mild tenderness around thyroid area. No JVD. No further tenderness around parotid or cervical chain  Resp: Respirations are unlabored. Clear to auscultation bilaterally. No rales, rhonchi or wheezes appreciated over the  lungs bilaterally. CV: RRR   Extremities: No clubbing or cyanosis. No edema of the lower limbs  bilaterally. No calf inflammation or tenderness. Abdomen: Abdomen is soft, nontender, and nondistended. No abdominal masses  appreciated. No palpable hepatosplenomegaly. Bowel sounds are normoactive. Skin: Dry and warm with no rash. Scar right forehead from squamous cell resection. Laceration dorsum left thumb DIP joint, relatively close, dry  Muscular skeletal: No acute joint inflammation. Neuro:Grossly intact without focal deficit      Office Labs This Visit :  No results found for this visit on 10/04/22. Assessment and Plan:     Diagnosis Orders   1. Throat pain  External Referral To ENT      2. Other migraine with status migrainosus, intractable        3. Pituitary neoplasm        4.  Health maintenance examination 5. Laceration of left thumb without foreign body without damage to nail, initial encounter  mupirocin (BACTROBAN) 2 % ointment    Tdap, JESSE, (age 9y-63y), IM      6. Thrush        7. 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 Risk of complication reviewed. Option of gluing reviewed, role of suture reviewed. Defers closure now understands timeframe issues. Bactroban ointment prescribed with precautions. Keep area clean and dry otherwise. Proper dressing reviewed. Notify if any issues      Stomatitis/throat pain  Sores resolved. However still throat pain after she speaks for long peers of time. Work-ups been negative so far including throat culture, CBC negative throat cultures been negative, coxsackie B2 antibody was positive and EBV VCA IgG positive, early antigen antibody and nuclear antibody counseled. Suzi Vega Ultrasounds of been negative. Cancel her appoint with Dr. Joao Caldwell because of insurance issues would like to see [facility] and referral placed      Thrush  Failed inconsistent nystatin, also status post Mycelex and probiotic. 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 Signs and symptoms watch were reviewed. She is worried for mouth issues being representative. Offered CT neck-defers now. Ultrasounds been negative    Sinusitis  Resolved after Augmentin      Questionable goiter   Some tenderness in this area as well. However ultrasound negative. Defer CT now. Last TFTs normal 10/21. Defers repeat now. Referred to [facility]NT       Migraine  Counseled extensively. Differential reviewed, including serious etiologies. She had an appointment with her neurologist at [facility], they rescheduled because they required an in person evaluation since it had been a while since seen. She had rescheduled for December.   Importance of follow-up reviewed. She had a significant adverse reaction to Topamax and no longer taking. In the past has had prednisone, methylprednisolone shot Toradol, Benadryl and Phenergan shot with good results. She was prescribed Imitrex last time with standard precautions including interactions, serotonin syndrome, vasoconstrictive properties. It does work well. She would like a refill. Appropriate way of taking reviewed. Nose runs after migraine. Discussed vasomotor rhinitis. Previously we prescribed low-dose gabapentin 100 mg nightly. OARRS report reviewed. After reading the side effects she decided not to take and is asymptomatic now. Recommended avoidance of triggers . Counseled on analgesic rebound ha's. higher risk of cva reviewed. signs and symptoms to watch for discussed. Serious signs and symptoms  reviewed. ER if any. November 2021 MRI    Also asking for refill of Zofran to take periodically for nausea with migraine, side effects instructions ADRs reviewed    Pituitary neoplasm  Counseled extensively. Differential reviewed, including serious etiologies. Blood  work negative, normal prolactin 11/21. MRI 11/21  \"tiny 3 mm nonenhancing nodule in the left side of the pituitary, slightly decreased compared to 3/8/2016 that could represent a cyst or nonenhancing microadenoma, otherwise unremarkable\". defers seeing endocrinology or neurosurgery. Should follow-up neurology. MRI 3/16 again. Summary was       No problem-specific Assessment & Plan notes found for this encounter. Plan as above. Counseled extensively and differential diagnoses relevant to above were reviewed, including serious etiologies, risks and complications, especially of left uncontrolled. If relevant, instructions and  alternatives to meds/treatment reviewed, as well as interactions, and  SE's/ADRs reviewed, notify immediately if any, discontinuing new meds if any.   Plan made after discussion and shared decision making. As above. Canceled Dr. Gretchen Moe, refer to [facility] at her request.  Sadie Grady as above. She will follow-up after seeing ENT within the next 6 weeks, sooner as needed    As long as symptoms steadily improve/resolve, and medical conditions follow the expected course, FU as below, sooner PRN. Return for after [facility] ent. Educational materials and/or home exercises printed for patient's review and were included in patient instructions on his/her After Visit Summary and given to patient at the end of visit. After discussion, patient and/or guardian verbalizes understanding, agrees, feels comfortable with and wishes to proceed with above treatment plan. Call for any pending results, FU sooner if abnormal, as needed or if any current symptoms persist/worsen. Advised patient to call with any new medication issues, and read all Rx info from pharmacy to assure aware of all possible risks and side effects of medication before taking. Reviewed age and gender appropriate health screening exams and vaccinations. Advised patient regarding importance of keeping up with recommended health maintenance and to schedule as soon as possible if overdue, as this is important in assessing for undiagnosed pathology, especially cancer, as well as protecting against potentially harmful/life threatening disease. Patient and/or guardian verbalizes understanding and agrees with above counseling, assessment and plan. All questions answered. Over 30 minutes  spent with the patient in reviewing records, reviewing with patient/family, counseling, ordering,  prescribing, completing h&p, etc., with over 50% of the time spent face to face counseling. Signs and symptoms to watch for discussed, serious signs and symptoms reviewed. ER if any.             Stephen Keller MD    Patients are advised to check with insurance company to ensure coverage and to fully understand benefits and cost prior to any testing. This note was created with the assistance of voice recognition software. Document was reviewed however may contain grammatical errors.

## 2022-11-28 ENCOUNTER — OFFICE VISIT (OUTPATIENT)
Dept: FAMILY MEDICINE CLINIC | Age: 52
End: 2022-11-28
Payer: COMMERCIAL

## 2022-11-28 VITALS
OXYGEN SATURATION: 98 % | WEIGHT: 183 LBS | SYSTOLIC BLOOD PRESSURE: 126 MMHG | HEART RATE: 91 BPM | BODY MASS INDEX: 29.41 KG/M2 | HEIGHT: 66 IN | DIASTOLIC BLOOD PRESSURE: 80 MMHG | TEMPERATURE: 97.8 F

## 2022-11-28 DIAGNOSIS — R68.89 FLU-LIKE SYMPTOMS: ICD-10-CM

## 2022-11-28 DIAGNOSIS — U07.1 COVID-19: Primary | ICD-10-CM

## 2022-11-28 LAB
INFLUENZA A ANTIBODY: NEGATIVE
INFLUENZA B ANTIBODY: NEGATIVE
Lab: ABNORMAL
QC PASS/FAIL: ABNORMAL
SARS-COV-2 RDRP RESP QL NAA+PROBE: POSITIVE

## 2022-11-28 PROCEDURE — 87635 SARS-COV-2 COVID-19 AMP PRB: CPT

## 2022-11-28 PROCEDURE — 87804 INFLUENZA ASSAY W/OPTIC: CPT

## 2022-11-28 PROCEDURE — 99213 OFFICE O/P EST LOW 20 MIN: CPT

## 2022-11-28 RX ORDER — BROMPHENIRAMINE MALEATE, PSEUDOEPHEDRINE HYDROCHLORIDE, AND DEXTROMETHORPHAN HYDROBROMIDE 2; 30; 10 MG/5ML; MG/5ML; MG/5ML
5 SYRUP ORAL 4 TIMES DAILY PRN
Qty: 120 ML | Refills: 0 | Status: SHIPPED
Start: 2022-11-28 | End: 2022-11-30 | Stop reason: SDUPTHER

## 2022-11-28 RX ORDER — DEXAMETHASONE 6 MG/1
6 TABLET ORAL DAILY
Qty: 7 TABLET | Refills: 0 | Status: SHIPPED | OUTPATIENT
Start: 2022-11-28 | End: 2022-12-05

## 2022-11-28 NOTE — PROGRESS NOTES
Chief Complaint       Nasal Congestion (Started Saturday), Fatigue, Other (No energy and off balance), and Fever (100.1)    History of Present Illness   Source of history provided by:  patient. Dercik Bucio is a 47 y.o. old female presenting to the walk in clinic for evaluation of nasal congestion, nasal drainage, fever (Tmax 102.1F), and increased fatigue x 3 days. Denies any SOB, CP, dyspnea, LE edema, abdominal pain, vomiting, rash, or lethargy. Denies any hx of asthma or COPD. Patient denies recent sick exposures. Patient has been vaccinated for COVID-19. Patient has been taking Nyquil OTC without symptomatic relief. Pt has not had covid-19infection. ROS    Unless otherwise stated in this report or unable to obtain because of the patient's clinical or mental status as evidenced by the medical record, this patients's positive and negative responses for Review of Systems, constitutional, psych, eyes, ENT, cardiovascular, respiratory, gastrointestinal, neurological, genitourinary, musculoskeletal, integument systems and systems related to the presenting problem are either stated in the preceding or were not pertinent or were negative for the symptoms and/or complaints related to the medical problem. Physical Exam         VS:  /80   Pulse 91   Temp 97.8 °F (36.6 °C)   Ht 5' 6\" (1.676 m)   Wt 183 lb (83 kg)   SpO2 98%   BMI 29.54 kg/m²    Oxygen Saturation Interpretation: Normal.    Constitutional:  Alert, development consistent with age. NAD. Head:  NC/NT. Airway patent. Mouth: Posterior pharynx with mild erythema and clear postnasal drip. No tonsillar hypertrophy or exudate. Neck:  Normal ROM. Supple. No anterior cervical adenopathy noted. Lungs: CTAB without wheezes, rales, or rhonchi. CV:  Regular rate and rhythm, normal heart sounds, without pathological murmurs, ectopy, gallops, or rubs. Skin:  Normal turgor. Warm, dry, without visible rash.   Lymphatic: No lymphangitis or adenopathy noted. Neurological:  Oriented. Motor functions intact. Lab / Imaging Results   (All laboratory and radiology results have been personally reviewed by myself)  Labs:  Results for orders placed or performed in visit on 11/28/22   POCT Influenza A/B   Result Value Ref Range    Influenza A Ab negative     Influenza B Ab negative    POCT COVID-19 Rapid, NAAT   Result Value Ref Range    SARS-COV-2, RdRp gene Positive (A) Negative    Lot Number [id number]     QC Pass/Fail pass        Imaging: All Radiology results interpreted by Radiologist unless otherwise noted. Assessment / Plan     Impression(s):  Viral Arreguin was seen today for nasal congestion, fatigue, other and fever. Diagnoses and all orders for this visit:    COVID-19  -     POCT COVID-19 Rapid, NAAT  -     dexamethasone (DECADRON) 6 MG tablet; Take 1 tablet by mouth daily for 7 days  -     brompheniramine-pseudoephedrine-DM (BROMFED DM) 2-30-10 MG/5ML syrup; Take 5 mLs by mouth 4 times daily as needed for Congestion or Cough    Flu-like symptoms  -     POCT Influenza A/B    Disposition:  Disposition: Discharge to home. Rapid COVID-19 testing is positive in office. Pt should remain out of public for at least 5 days from the start of symptoms. Additional 5 days out in public fully masked. Pt should also be fever free for 24 hours and symptoms should be improved overall prior to returning. Increase fluids and rest. Symptomatic relief discussed including Tylenol prn pain/fever. Schedule f/u with PCP in 7-10 days if symptoms persist. ED sooner if symptoms worsen or change. ED immediately with high or refractory fever, progressive SOB, dyspnea, CP, calf pain/swelling, shaking chills, vomiting, abdominal pain, lethargy, flank pain, or decreased urinary output. Pt verbalizes understanding and is in agreement with plan of care. All questions answered. ROSCOE James    **This report was transcribed using voice recognition software.  Every effort was made to ensure accuracy; however, inadvertent computerized transcription errors may be present.

## 2022-11-30 ENCOUNTER — HOSPITAL ENCOUNTER (EMERGENCY)
Age: 52
Discharge: HOME | End: 2022-12-01
Attending: EMERGENCY MEDICINE
Payer: COMMERCIAL

## 2022-11-30 ENCOUNTER — OFFICE VISIT (OUTPATIENT)
Dept: FAMILY MEDICINE CLINIC | Age: 52
End: 2022-11-30
Payer: COMMERCIAL

## 2022-11-30 ENCOUNTER — APPOINTMENT (OUTPATIENT)
Dept: GENERAL RADIOLOGY | Age: 52
End: 2022-11-30
Payer: COMMERCIAL

## 2022-11-30 VITALS
TEMPERATURE: 97.1 F | BODY MASS INDEX: 30.75 KG/M2 | HEART RATE: 53 BPM | DIASTOLIC BLOOD PRESSURE: 80 MMHG | OXYGEN SATURATION: 97 % | WEIGHT: 184.6 LBS | HEIGHT: 65 IN | RESPIRATION RATE: 18 BRPM | SYSTOLIC BLOOD PRESSURE: 130 MMHG

## 2022-11-30 DIAGNOSIS — U07.1 COVID-19: Primary | ICD-10-CM

## 2022-11-30 DIAGNOSIS — J06.9 ACUTE UPPER RESPIRATORY INFECTION, UNSPECIFIED: ICD-10-CM

## 2022-11-30 DIAGNOSIS — R05.9 COUGH, UNSPECIFIED TYPE: ICD-10-CM

## 2022-11-30 DIAGNOSIS — G43.811 OTHER MIGRAINE WITH STATUS MIGRAINOSUS, INTRACTABLE: ICD-10-CM

## 2022-11-30 LAB
ANION GAP SERPL CALCULATED.3IONS-SCNC: 8 MMOL/L (ref 7–16)
BASOPHILS ABSOLUTE: 0.07 E9/L (ref 0–0.2)
BASOPHILS RELATIVE PERCENT: 0.5 % (ref 0–2)
BUN BLDV-MCNC: 19 MG/DL (ref 6–20)
CALCIUM SERPL-MCNC: 9 MG/DL (ref 8.6–10.2)
CHLORIDE BLD-SCNC: 103 MMOL/L (ref 98–107)
CO2: 30 MMOL/L (ref 22–29)
CREAT SERPL-MCNC: 0.7 MG/DL (ref 0.5–1)
EOSINOPHILS ABSOLUTE: 0.32 E9/L (ref 0.05–0.5)
EOSINOPHILS RELATIVE PERCENT: 2.5 % (ref 0–6)
GFR SERPL CREATININE-BSD FRML MDRD: >60 ML/MIN/1.73
GLUCOSE BLD-MCNC: 101 MG/DL (ref 74–99)
HCT VFR BLD CALC: 46 % (ref 34–48)
HEMOGLOBIN: 14.9 G/DL (ref 11.5–15.5)
IMMATURE GRANULOCYTES #: 0.04 E9/L
IMMATURE GRANULOCYTES %: 0.3 % (ref 0–5)
LYMPHOCYTES ABSOLUTE: 3.24 E9/L (ref 1.5–4)
LYMPHOCYTES RELATIVE PERCENT: 25.3 % (ref 20–42)
MCH RBC QN AUTO: 29.3 PG (ref 26–35)
MCHC RBC AUTO-ENTMCNC: 32.4 % (ref 32–34.5)
MCV RBC AUTO: 90.6 FL (ref 80–99.9)
MONOCYTES ABSOLUTE: 0.96 E9/L (ref 0.1–0.95)
MONOCYTES RELATIVE PERCENT: 7.5 % (ref 2–12)
NEUTROPHILS ABSOLUTE: 8.19 E9/L (ref 1.8–7.3)
NEUTROPHILS RELATIVE PERCENT: 63.9 % (ref 43–80)
PDW BLD-RTO: 13.5 FL (ref 11.5–15)
PLATELET # BLD: 265 E9/L (ref 130–450)
PMV BLD AUTO: 9.2 FL (ref 7–12)
POTASSIUM SERPL-SCNC: 3.9 MMOL/L (ref 3.5–5)
RBC # BLD: 5.08 E12/L (ref 3.5–5.5)
SODIUM BLD-SCNC: 141 MMOL/L (ref 132–146)
TROPONIN, HIGH SENSITIVITY: <6 NG/L (ref 0–9)
WBC # BLD: 12.8 E9/L (ref 4.5–11.5)

## 2022-11-30 PROCEDURE — 99214 OFFICE O/P EST MOD 30 MIN: CPT | Performed by: PHYSICIAN ASSISTANT

## 2022-11-30 PROCEDURE — 84484 ASSAY OF TROPONIN QUANT: CPT

## 2022-11-30 PROCEDURE — 6360000002 HC RX W HCPCS: Performed by: EMERGENCY MEDICINE

## 2022-11-30 PROCEDURE — 6370000000 HC RX 637 (ALT 250 FOR IP): Performed by: EMERGENCY MEDICINE

## 2022-11-30 PROCEDURE — 71045 X-RAY EXAM CHEST 1 VIEW: CPT

## 2022-11-30 PROCEDURE — 96374 THER/PROPH/DIAG INJ IV PUSH: CPT

## 2022-11-30 PROCEDURE — 93005 ELECTROCARDIOGRAM TRACING: CPT | Performed by: EMERGENCY MEDICINE

## 2022-11-30 PROCEDURE — 3006F CXR DOC REV: CPT | Performed by: PHYSICIAN ASSISTANT

## 2022-11-30 PROCEDURE — 99285 EMERGENCY DEPT VISIT HI MDM: CPT

## 2022-11-30 PROCEDURE — 80048 BASIC METABOLIC PNL TOTAL CA: CPT

## 2022-11-30 PROCEDURE — 96375 TX/PRO/DX INJ NEW DRUG ADDON: CPT

## 2022-11-30 PROCEDURE — 2580000003 HC RX 258: Performed by: EMERGENCY MEDICINE

## 2022-11-30 PROCEDURE — 85025 COMPLETE CBC W/AUTO DIFF WBC: CPT

## 2022-11-30 RX ORDER — DEXAMETHASONE SODIUM PHOSPHATE 10 MG/ML
10 INJECTION INTRAMUSCULAR; INTRAVENOUS ONCE
Status: COMPLETED | OUTPATIENT
Start: 2022-11-30 | End: 2022-11-30

## 2022-11-30 RX ORDER — DIPHENHYDRAMINE HYDROCHLORIDE 50 MG/ML
25 INJECTION INTRAMUSCULAR; INTRAVENOUS ONCE
Status: COMPLETED | OUTPATIENT
Start: 2022-11-30 | End: 2022-11-30

## 2022-11-30 RX ORDER — ALBUTEROL SULFATE 90 UG/1
2 AEROSOL, METERED RESPIRATORY (INHALATION) 4 TIMES DAILY PRN
Qty: 18 G | Refills: 0 | Status: SHIPPED
Start: 2022-11-30 | End: 2022-11-30 | Stop reason: SDUPTHER

## 2022-11-30 RX ORDER — ZINC SULFATE 50(220)MG
50 CAPSULE ORAL DAILY
Qty: 7 CAPSULE | Refills: 0 | Status: SHIPPED | OUTPATIENT
Start: 2022-11-30 | End: 2022-12-07

## 2022-11-30 RX ORDER — METOCLOPRAMIDE HYDROCHLORIDE 5 MG/ML
10 INJECTION INTRAMUSCULAR; INTRAVENOUS ONCE
Status: COMPLETED | OUTPATIENT
Start: 2022-11-30 | End: 2022-11-30

## 2022-11-30 RX ORDER — BROMPHENIRAMINE MALEATE, PSEUDOEPHEDRINE HYDROCHLORIDE, AND DEXTROMETHORPHAN HYDROBROMIDE 2; 30; 10 MG/5ML; MG/5ML; MG/5ML
5 SYRUP ORAL 4 TIMES DAILY PRN
Qty: 120 ML | Refills: 0 | Status: SHIPPED | OUTPATIENT
Start: 2022-11-30

## 2022-11-30 RX ORDER — DEXTROMETHORPHAN HYDROBROMIDE AND PROMETHAZINE HYDROCHLORIDE 15; 6.25 MG/5ML; MG/5ML
5 SYRUP ORAL 4 TIMES DAILY PRN
Qty: 120 ML | Refills: 0 | Status: SHIPPED | OUTPATIENT
Start: 2022-11-30 | End: 2022-12-07

## 2022-11-30 RX ORDER — AZITHROMYCIN 250 MG/1
250 TABLET, FILM COATED ORAL SEE ADMIN INSTRUCTIONS
Qty: 6 TABLET | Refills: 0 | Status: SHIPPED | OUTPATIENT
Start: 2022-11-30 | End: 2022-12-05

## 2022-11-30 RX ORDER — ASCORBIC ACID 500 MG
500 TABLET ORAL 2 TIMES DAILY
Qty: 14 TABLET | Refills: 0 | Status: SHIPPED | OUTPATIENT
Start: 2022-11-30 | End: 2022-12-07

## 2022-11-30 RX ORDER — BENZONATATE 100 MG/1
100 CAPSULE ORAL ONCE
Status: COMPLETED | OUTPATIENT
Start: 2022-11-30 | End: 2022-11-30

## 2022-11-30 RX ORDER — ONDANSETRON 4 MG/1
4 TABLET, ORALLY DISINTEGRATING ORAL 3 TIMES DAILY PRN
Qty: 21 TABLET | Refills: 1 | Status: SHIPPED | OUTPATIENT
Start: 2022-11-30

## 2022-11-30 RX ORDER — 0.9 % SODIUM CHLORIDE 0.9 %
1000 INTRAVENOUS SOLUTION INTRAVENOUS ONCE
Status: COMPLETED | OUTPATIENT
Start: 2022-11-30 | End: 2022-12-01

## 2022-11-30 RX ORDER — ALBUTEROL SULFATE 90 UG/1
2 AEROSOL, METERED RESPIRATORY (INHALATION) 4 TIMES DAILY PRN
Qty: 18 G | Refills: 0 | Status: SHIPPED | OUTPATIENT
Start: 2022-11-30

## 2022-11-30 RX ADMIN — METOCLOPRAMIDE 10 MG: 5 INJECTION, SOLUTION INTRAMUSCULAR; INTRAVENOUS at 22:16

## 2022-11-30 RX ADMIN — SODIUM CHLORIDE 1000 ML: 9 INJECTION, SOLUTION INTRAVENOUS at 22:14

## 2022-11-30 RX ADMIN — DIPHENHYDRAMINE HYDROCHLORIDE 25 MG: 50 INJECTION, SOLUTION INTRAMUSCULAR; INTRAVENOUS at 22:15

## 2022-11-30 RX ADMIN — DEXAMETHASONE SODIUM PHOSPHATE 10 MG: 10 INJECTION INTRAMUSCULAR; INTRAVENOUS at 22:18

## 2022-11-30 RX ADMIN — BENZONATATE 100 MG: 100 CAPSULE ORAL at 22:19

## 2022-11-30 ASSESSMENT — PAIN - FUNCTIONAL ASSESSMENT: PAIN_FUNCTIONAL_ASSESSMENT: NONE - DENIES PAIN

## 2022-11-30 NOTE — PROGRESS NOTES
22  Kim Palmer : 1970 Sex: female  Age 47 y.o. Subjective:  Chief Complaint   Patient presents with    Chest Congestion     Tested covid positive here [facility] . Now coughing up mucus     Cough         HPI:   Kim Palmer , 47 y.o. female presents to express care for evaluation of chest congestion, drainage    HPI  54-year-old female presents to express care for evaluation of chest congestion, drainage. The patient's had the symptoms ongoing for the last several days. She was here on Monday and tested positive for COVID-19. She is never had COVID before. The patient has had COVID-vaccine and booster. The patient is now coughing up some mucus. The patient was started on dexamethasone and Bromfed. The patient's symptoms essentially started on Saturday into . The patient was not placed on antiviral therapy. ROS:   Unless otherwise stated in this report the patient's positive and negative responses for review of systems for constitutional, eyes, ENT, cardiovascular, respiratory, gastrointestinal, neurological, , musculoskeletal, and integument systems and related systems to the presenting problem are either stated in the history of present illness or were not pertinent or were negative for the symptoms and/or complaints related to the presenting medical problem. Positives and pertinent negatives as per HPI. All others reviewed and are negative.       PMH:     Past Medical History:   Diagnosis Date    Blood in urine     history of    Constipation     Endometriosis     H/O superficial phlebitis 2021    Migraines     Raynaud's disease     Superficial phlebitis of leg, right 2020    Varicose veins of leg with pain, right 2020       Past Surgical History:   Procedure Laterality Date    ANKLE SURGERY      APPENDECTOMY      CHOLECYSTECTOMY      CYSTOSCOPY      HERNIA REPAIR      LAPAROSCOPY      multiple for endometriosis    TONSILLECTOMY         Family History Problem Relation Age of Onset    Heart Disease Mother     Thyroid Disease Mother     Other Mother         kidney problems    Heart Disease Father     Allergy (Severe) Father         seasonal    Other Maternal Grandmother         brain tumor    Other Maternal Aunt         aneurysm    Cancer Paternal Cousin         ovarian        Medications:     Current Outpatient Medications:     azithromycin (ZITHROMAX) 250 MG tablet, Take 1 tablet by mouth See Admin Instructions for 5 days 500mg on day 1 followed by 250mg on days 2 - 5, Disp: 6 tablet, Rfl: 0    promethazine-dextromethorphan (PROMETHAZINE-DM) 6.25-15 MG/5ML syrup, Take 5 mLs by mouth 4 times daily as needed for Cough, Disp: 120 mL, Rfl: 0    albuterol sulfate HFA (PROVENTIL;VENTOLIN;PROAIR) 108 (90 Base) MCG/ACT inhaler, Inhale 2 puffs into the lungs 4 times daily as needed for Wheezing, Disp: 18 g, Rfl: 0    atenolol (TENORMIN) 25 MG tablet, TAKE 1 TABLET BY MOUTH DAILY, Disp: , Rfl:     dexamethasone (DECADRON) 6 MG tablet, Take 1 tablet by mouth daily for 7 days, Disp: 7 tablet, Rfl: 0    brompheniramine-pseudoephedrine-DM (BROMFED DM) 2-30-10 MG/5ML syrup, Take 5 mLs by mouth 4 times daily as needed for Congestion or Cough, Disp: 120 mL, Rfl: 0    ondansetron (ZOFRAN-ODT) 4 MG disintegrating tablet, Take 1 tablet by mouth 3 times daily as needed for Nausea or Vomiting, Disp: 21 tablet, Rfl: 1    SUMAtriptan (IMITREX) 50 MG tablet, 1/2-1 po at onset of migraine PRN, may repeat x 1 in 2hrs (Patient not taking: Reported on 10/4/2022), Disp: 9 tablet, Rfl: 3    Allergies: Allergies   Allergen Reactions    Morphine      Reaction is reddened skin and patient \"blacks out\"    Oxycodone Anxiety    Tomato Anaphylaxis    Alprazolam      Reaction is hallucinations  hallucinations    Hydrocodone-Acetaminophen      GI upset    Influenza Vaccines      ?  Neuro issue and neurologist said not to get again    Oseltamivir     Oxycontin [Oxycodone Hcl]      Reaction is reddened skin and patient \"blacks out\"    Tamiflu [Oseltamivir Phosphate]      Reaction is hallucinations    Topiramate      Weakness, memory loss, nausea/emesis, sweats    Adhesive Tape Rash     Reaction is sloughing of skin  Skin comes off    Oseltamivir Phosphate Nausea And Vomiting       Social History:     Social History     Tobacco Use    Smoking status: Never    Smokeless tobacco: Never   Vaping Use    Vaping Use: Never used   Substance Use Topics    Alcohol use: No    Drug use: No       Patient lives at home. Physical Exam:     Vitals:    11/30/22 1306   BP: 130/80   Site: Right Upper Arm   Position: Sitting   Cuff Size: Medium Adult   Pulse: 53   Resp: 18   Temp: 97.1 °F (36.2 °C)   TempSrc: Temporal   SpO2: 97%   Weight: 184 lb 9.6 oz (83.7 kg)   Height: 5' 5\" (1.651 m)       Exam:  Physical Exam  Nurse's notes and vital signs reviewed. The patient is not hypoxic. ? General: Alert, no acute distress, patient resting comfortably Patient is not toxic or lethargic. Skin: Warm, intact, no pallor noted. There is no evidence of rash at this time. Head: Normocephalic, atraumatic  Eye: Normal conjunctiva  Ears, Nose, Throat: Right tympanic membrane clear, left tympanic membrane clear. No drainage or discharge noted. No pre- or post-auricular tenderness, erythema, or swelling noted. Nasal congestion, rhinorrhea, no epistaxis  Posterior oropharynx shows erythema but no evidence of tonsillar hypertrophy, or exudate. the uvula is midline. No trismus or drooling is noted. Moist mucous membranes. Neck: No anterior/posterior lymphadenopathy noted. No erythema, no masses, no fluctuance or induration noted. No meningeal signs. Cardiovascular: Regular Rate and Rhythm  Respiratory: No acute distress, diminished lung sounds bilaterally but no rhonchi, wheezing or crackles noted. No stridor or retractions are noted.   Neurological: A&O x4, normal speech  Psychiatric: Cooperative       Testing:           Medical Decision Making:     Vital signs reviewed    Past medical history reviewed. Allergies reviewed. Medications reviewed. Patient on arrival does not appear to be in any apparent distress or discomfort. The patient has been seen and evaluated. The patient does not appear to be toxic or lethargic. The patient was sent for chest x-ray. Chest x-ray did not show any definite acute cardiopulmonary process. We started the patient on azithromycin, Promethazine DM. The patient additionally will be given albuterol inhaler. The patient did have a episode of posttussive emesis here. The patient is declining ER evaluation. COVID-19 was detected as positive. We will have the patient quarantine, isolate per CDC guidelines. Call with any questions or concerns. Return if any of the signs or symptoms change or worsen for further evaluation and possible imaging/chest x-ray. Continue with vitamin regimen, fluids, rest.  Use Motrin, Tylenol. Monitor pulse ox (less than 92% go to ED). Follow-up with PCP or return to [facility] for evaluation. If symptoms worsen go directly to the ED    The patient was educated on the proper dosage of motrin and tylenol and the appropriate intervals of each. The patient is to increase fluid intake over the next several days. The patient is to use OTC decongestant as needed. The patient is to return to express care or go directly to the emergency department should any of the signs or symptoms worsen. The patient is to followup with primary care physician in 2-3 days for repeat evaluation. The patient has no other questions or concerns at this time the patient will be discharged home. Clinical Impression:   Veronica Stephens was seen today for chest congestion and cough. Diagnoses and all orders for this visit:    COVID-19  -     XR CHEST STANDARD (2 VW); Future    Acute upper respiratory infection, unspecified  -     azithromycin (ZITHROMAX) 250 MG tablet;  Take 1 tablet by mouth See Admin Instructions for 5 days 500mg on day 1 followed by 250mg on days 2 - 5    Cough, unspecified type    Other orders  -     promethazine-dextromethorphan (PROMETHAZINE-DM) 6.25-15 MG/5ML syrup; Take 5 mLs by mouth 4 times daily as needed for Cough  -     albuterol sulfate HFA (PROVENTIL;VENTOLIN;PROAIR) 108 (90 Base) MCG/ACT inhaler; Inhale 2 puffs into the lungs 4 times daily as needed for Wheezing      The patient is to call for any concerns or return if any of the signs or symptoms worsen. The patient is to follow-up with PCP in the next 2-3 days for repeat evaluation repeat assessment or go directly to the emergency department.      SIGNATURE: Beck Hollingsworth III, PA-C

## 2022-11-30 NOTE — ED NOTES
Department of Emergency Medicine  FIRST PROVIDER TRIAGE NOTE             Independent MLP           11/30/22  3:56 PM EST    Date of Encounter: 11/30/22   MRN: [id number]      HPI: Melissa Harmon is a 47 y.o. female who presents to the ED for No chief complaint on file. Sudden onset cough, SOB and \"lungs filling with fluid\" last night. Went to [facility] and had an XR performed. [facility] sent patient here for a CT scan? COVID + on Monday. ROS: Negative for abd pain or back pain. PE: Gen Appearance/Constitutional: alert  Musculoskeletal: moves all extremities x 4     Initial Plan of Care: All treatment areas with department are currently occupied. Plan to order/Initiate the following while awaiting opening in ED: labs.   Initiate Treatment-Testing, Proceed toTreatment Area When Bed Available for ED Attending/MLP to Continue Care    Electronically signed by SRINATH Reilly CNP   DD: 11/30/22      SRINATH Reilly CNP  11/30/22 4012

## 2022-12-01 VITALS
OXYGEN SATURATION: 98 % | TEMPERATURE: 98.3 F | BODY MASS INDEX: 30.66 KG/M2 | DIASTOLIC BLOOD PRESSURE: 64 MMHG | HEART RATE: 65 BPM | RESPIRATION RATE: 15 BRPM | SYSTOLIC BLOOD PRESSURE: 111 MMHG | HEIGHT: 65 IN | WEIGHT: 184 LBS

## 2022-12-01 LAB
EKG ATRIAL RATE: 67 BPM
EKG P AXIS: 30 DEGREES
EKG P-R INTERVAL: 152 MS
EKG Q-T INTERVAL: 390 MS
EKG QRS DURATION: 74 MS
EKG QTC CALCULATION (BAZETT): 412 MS
EKG R AXIS: 0 DEGREES
EKG T AXIS: 12 DEGREES
EKG VENTRICULAR RATE: 67 BPM

## 2022-12-01 PROCEDURE — 93010 ELECTROCARDIOGRAM REPORT: CPT | Performed by: INTERNAL MEDICINE

## 2022-12-01 NOTE — ED PROVIDER NOTES
HPI:  11/30/22,   Time: 8:57 PM NICK Rondon is a 47 y.o. female presenting to the ED for cough and shortness of breath, beginning 1 day ago. The complaint has been persistent, moderate in severity, and worsened by nothing. The patient states she was recently diagnosed with COVID-19 on Monday. She states her symptoms started on Saturday and she felt as if she was just having bad allergies. She states that since then it is progressed and last night she began having cough where she is having productive cough of mucus. She states that she is feeling short of breath as well. She states the cough is so bad that she went to urgent care today and urgent care sent her here for further evaluation due to her cough and shortness of breath. No chest pain. No pleuritic chest pain. No fevers or chills. No nausea vomiting diarrhea. Review of Systems:   Pertinent positives and negatives are stated within HPI, all other systems reviewed and are negative.          --------------------------------------------- PAST HISTORY ---------------------------------------------  Past Medical History:  has a past medical history of Blood in urine, Constipation, Endometriosis, H/O superficial phlebitis, Migraines, Raynaud's disease, Superficial phlebitis of leg, right, and Varicose veins of leg with pain, right. Past Surgical History:  has a past surgical history that includes Appendectomy; hernia repair; Ankle surgery; Cholecystectomy; Tonsillectomy; laparoscopy; and Cystoscopy. Social History:  reports that she has never smoked. She has never used smokeless tobacco. She reports that she does not drink alcohol and does not use drugs. Family History: family history includes Allergy (Severe) in her father; Cancer in her paternal cousin; Heart Disease in her father and mother; Other in her maternal aunt, maternal grandmother, and mother; Thyroid Disease in her mother.      The patients home medications have been reviewed. Allergies: Morphine, Oxycodone, Tomato, Alprazolam, Hydrocodone-acetaminophen, Influenza vaccines, Oseltamivir, Oxycontin [oxycodone hcl], Tamiflu [oseltamivir phosphate], Topiramate, Adhesive tape, and Oseltamivir phosphate        ---------------------------------------------------PHYSICAL EXAM--------------------------------------    Constitutional/General: Alert and oriented x3, well appearing, non toxic in NAD  Head: Normocephalic and atraumatic  Eyes: PERRL, EOMI, conjunctive normal, sclera non icteric  Mouth: Oropharynx clear, handling secretions, no trismus, no asymmetry of the posterior oropharynx or uvular edema  Neck: Supple, full ROM, non tender to palpation in the midline, no stridor, no crepitus, no meningeal signs  Respiratory: Lungs clear to auscultation bilaterally, no wheezes, rales, or rhonchi. Not in respiratory distress  Cardiovascular:  Regular rate. Regular rhythm. No murmurs, gallops, or rubs. 2+ distal pulses  GI:  Abdomen Soft, Non tender, Non distended. +BS. No organomegaly, no palpable masses,  No rebound, guarding, or rigidity. Musculoskeletal: Moves all extremities x 4. Warm and well perfused, no clubbing, cyanosis, or edema. Capillary refill <3 seconds  Integument: skin warm and dry. No rashes. Neurologic: GCS 15, no focal deficits, symmetric strength 5/5 in the upper and lower extremities bilaterally  Psychiatric: Normal Affect    -------------------------------------------------- RESULTS -------------------------------------------------  I have personally reviewed all laboratory and imaging results for this patient. Results are listed below.      LABS:  Results for orders placed or performed during the hospital encounter of 87/39/43   Basic Metabolic Panel   Result Value Ref Range    Sodium 141 132 - 146 mmol/L    Potassium 3.9 3.5 - 5.0 mmol/L    Chloride 103 98 - 107 mmol/L    CO2 30 (H) 22 - 29 mmol/L    Anion Gap 8 7 - 16 mmol/L    Glucose 101 (H) 74 - 99 mg/dL BUN 19 6 - 20 mg/dL    Creatinine 0.7 0.5 - 1.0 mg/dL    Est, Glom Filt Rate >60 >=60 mL/min/1.73    Calcium 9.0 8.6 - 10.2 mg/dL   CBC with Auto Differential   Result Value Ref Range    WBC 12.8 (H) 4.5 - 11.5 E9/L    RBC 5.08 3.50 - 5.50 E12/L    Hemoglobin 14.9 11.5 - 15.5 g/dL    Hematocrit 46.0 34.0 - 48.0 %    MCV 90.6 80.0 - 99.9 fL    MCH 29.3 26.0 - 35.0 pg    MCHC 32.4 32.0 - 34.5 %    RDW 13.5 11.5 - 15.0 fL    Platelets 993 283 - 628 E9/L    MPV 9.2 7.0 - 12.0 fL    Neutrophils % 63.9 43.0 - 80.0 %    Immature Granulocytes % 0.3 0.0 - 5.0 %    Lymphocytes % 25.3 20.0 - 42.0 %    Monocytes % 7.5 2.0 - 12.0 %    Eosinophils % 2.5 0.0 - 6.0 %    Basophils % 0.5 0.0 - 2.0 %    Neutrophils Absolute 8.19 (H) 1.80 - 7.30 E9/L    Immature Granulocytes # 0.04 E9/L    Lymphocytes Absolute 3.24 1.50 - 4.00 E9/L    Monocytes Absolute 0.96 (H) 0.10 - 0.95 E9/L    Eosinophils Absolute 0.32 0.05 - 0.50 E9/L    Basophils Absolute 0.07 0.00 - 0.20 E9/L   Troponin   Result Value Ref Range    Troponin, High Sensitivity <6 0 - 9 ng/L   EKG 12 Lead   Result Value Ref Range    Ventricular Rate 67 BPM    Atrial Rate 67 BPM    P-R Interval 152 ms    QRS Duration 74 ms    Q-T Interval 390 ms    QTc Calculation (Bazett) 412 ms    P Axis 30 degrees    R Axis 0 degrees    T Axis 12 degrees       RADIOLOGY:  Interpreted by Radiologist.  XR CHEST PORTABLE   Final Result   Stable chest radiograph. No pneumonia or pleural effusion. EKG:  This EKG is signed and interpreted by the EP. EKG shows normal sinus rhythm sinus rhythm at 67 bpm.  Normal QRS. Nonspecific ST-T wave changes. No STEMI.    ------------------------- NURSING NOTES AND VITALS REVIEWED ---------------------------   The nursing notes within the ED encounter and vital signs as below have been reviewed by myself.   BP (!) 132/56   Pulse 65   Temp 97.1 °F (36.2 °C)   Resp 16   Ht 5' 5\" (1.651 m)   Wt 184 lb (83.5 kg)   SpO2 100%   BMI 30.62 kg/m² Oxygen Saturation Interpretation: Normal    The patients available past medical records and past encounters were reviewed. ------------------------------ ED COURSE/MEDICAL DECISION MAKING----------------------  Medications   0.9 % sodium chloride bolus (has no administration in time range)   metoclopramide (REGLAN) injection 10 mg (has no administration in time range)   diphenhydrAMINE (BENADRYL) injection 25 mg (has no administration in time range)   benzonatate (TESSALON) capsule 100 mg (has no administration in time range)   dexamethasone (DECADRON) injection 10 mg (has no administration in time range)         ED COURSE:       Medical Decision Making: This is a 58-year-old female who presents to the ED for cough and shortness of breath. Patient underwent laboratory work-up showed a normal CBC except for white count of 12.8. Normal chemistry except for bicarb of 30 and a glucose of 101. Troponin less than 6. Chest x-ray unremarkable. Patient given migraine cocktail here in the emergency department. Patient also given Tessalon Perles. Patient has no hypoxia. No leg swelling. No pleuritic chest pain. No chest pain at all. No signs of PE. Patient stable for outpatient management. Return precautions given. Patient agrees with plan. I, Dr. Fredrick Perdue, am the primary provider for this encounter    This patient's ED course included: a personal history and physicial examination, re-evaluation prior to disposition, multiple bedside re-evaluations, IV medications, cardiac monitoring, and continuous pulse oximetry    This patient has remained hemodynamically stable during their ED course. Re-Evaluations:             Re-evaluation. Patients symptoms are improving      Counseling: The emergency provider has spoken with the patient and discussed todays results, in addition to providing specific details for the plan of care and counseling regarding the diagnosis and prognosis. Questions are answered at this time and they are agreeable with the plan.       --------------------------------- IMPRESSION AND DISPOSITION ---------------------------------    IMPRESSION  1. COVID-19        DISPOSITION  Disposition: Discharge to home  Patient condition is stable    NOTE: This report was transcribed using voice recognition software.  Every effort was made to ensure accuracy; however, inadvertent computerized transcription errors may be present        Laura Carter DO  11/30/22 [id number]

## 2022-12-01 NOTE — ED NOTES
Pulse oximeter and teaching provided, patient able to demonstrate the proper way to apply pulse oximeter       Viv Soler RN  12/01/22 8686

## 2023-03-09 ENCOUNTER — OFFICE VISIT (OUTPATIENT)
Dept: PRIMARY CARE CLINIC | Age: 53
End: 2023-03-09

## 2023-03-09 VITALS
SYSTOLIC BLOOD PRESSURE: 122 MMHG | OXYGEN SATURATION: 97 % | WEIGHT: 183 LBS | HEART RATE: 74 BPM | TEMPERATURE: 97.3 F | DIASTOLIC BLOOD PRESSURE: 70 MMHG | BODY MASS INDEX: 30.45 KG/M2

## 2023-03-09 DIAGNOSIS — B96.89 ACUTE BACTERIAL SINUSITIS: ICD-10-CM

## 2023-03-09 DIAGNOSIS — N39.0 URINARY TRACT INFECTION WITH HEMATURIA, SITE UNSPECIFIED: Primary | ICD-10-CM

## 2023-03-09 DIAGNOSIS — E87.0 HYPERNATREMIA: ICD-10-CM

## 2023-03-09 DIAGNOSIS — C76.0 SQUAMOUS CELL CARCINOMA OF HEAD AND NECK (HCC): ICD-10-CM

## 2023-03-09 DIAGNOSIS — R31.9 URINARY TRACT INFECTION WITH HEMATURIA, SITE UNSPECIFIED: ICD-10-CM

## 2023-03-09 DIAGNOSIS — R31.9 HEMATURIA, UNSPECIFIED TYPE: ICD-10-CM

## 2023-03-09 DIAGNOSIS — N39.0 URINARY TRACT INFECTION WITH HEMATURIA, SITE UNSPECIFIED: ICD-10-CM

## 2023-03-09 DIAGNOSIS — D72.829 LEUKOCYTOSIS, UNSPECIFIED TYPE: ICD-10-CM

## 2023-03-09 DIAGNOSIS — R31.9 URINARY TRACT INFECTION WITH HEMATURIA, SITE UNSPECIFIED: Primary | ICD-10-CM

## 2023-03-09 DIAGNOSIS — G43.811 OTHER MIGRAINE WITH STATUS MIGRAINOSUS, INTRACTABLE: ICD-10-CM

## 2023-03-09 DIAGNOSIS — J01.90 ACUTE BACTERIAL SINUSITIS: ICD-10-CM

## 2023-03-09 PROBLEM — C44.42 SQUAMOUS CELL CARCINOMA OF HEAD AND NECK: Status: ACTIVE | Noted: 2023-03-09

## 2023-03-09 LAB
BILIRUBIN, POC: NEGATIVE
BLOOD URINE, POC: ABNORMAL
CLARITY, POC: CLEAR
COLOR, POC: YELLOW
GLUCOSE URINE, POC: NEGATIVE
KETONES, POC: NEGATIVE
LEUKOCYTE EST, POC: ABNORMAL
NITRITE, POC: NEGATIVE
PH, POC: 7
PROTEIN, POC: NEGATIVE
SPECIFIC GRAVITY, POC: 1.01
UROBILINOGEN, POC: 0.2

## 2023-03-09 RX ORDER — VENLAFAXINE HYDROCHLORIDE 75 MG/1
75 CAPSULE, EXTENDED RELEASE ORAL DAILY
COMMUNITY
Start: 2023-03-07

## 2023-03-09 RX ORDER — CEFDINIR 300 MG/1
300 CAPSULE ORAL 2 TIMES DAILY
Qty: 20 CAPSULE | Refills: 0 | Status: SHIPPED | OUTPATIENT
Start: 2023-03-09 | End: 2023-03-19

## 2023-03-09 SDOH — ECONOMIC STABILITY: INCOME INSECURITY: HOW HARD IS IT FOR YOU TO PAY FOR THE VERY BASICS LIKE FOOD, HOUSING, MEDICAL CARE, AND HEATING?: NOT HARD AT ALL

## 2023-03-09 SDOH — ECONOMIC STABILITY: HOUSING INSECURITY
IN THE LAST 12 MONTHS, WAS THERE A TIME WHEN YOU DID NOT HAVE A STEADY PLACE TO SLEEP OR SLEPT IN A SHELTER (INCLUDING NOW)?: NO

## 2023-03-09 SDOH — ECONOMIC STABILITY: FOOD INSECURITY: WITHIN THE PAST 12 MONTHS, YOU WORRIED THAT YOUR FOOD WOULD RUN OUT BEFORE YOU GOT MONEY TO BUY MORE.: NEVER TRUE

## 2023-03-09 SDOH — ECONOMIC STABILITY: FOOD INSECURITY: WITHIN THE PAST 12 MONTHS, THE FOOD YOU BOUGHT JUST DIDN'T LAST AND YOU DIDN'T HAVE MONEY TO GET MORE.: NEVER TRUE

## 2023-03-09 ASSESSMENT — PATIENT HEALTH QUESTIONNAIRE - PHQ9
SUM OF ALL RESPONSES TO PHQ QUESTIONS 1-9: 0
2. FEELING DOWN, DEPRESSED OR HOPELESS: 0
SUM OF ALL RESPONSES TO PHQ QUESTIONS 1-9: 0
1. LITTLE INTEREST OR PLEASURE IN DOING THINGS: 0
SUM OF ALL RESPONSES TO PHQ QUESTIONS 1-9: 0
SUM OF ALL RESPONSES TO PHQ QUESTIONS 1-9: 0
SUM OF ALL RESPONSES TO PHQ9 QUESTIONS 1 & 2: 0

## 2023-03-09 NOTE — ASSESSMENT & PLAN NOTE
Resected advanced derm summer 2022. Pinky Angles Also basal cell resected left knee oct 2022. Follows regularly.

## 2023-03-09 NOTE — PROGRESS NOTES
Ilean Risk : 1970 Sex: female  Age: 47 y.o. Chief Complaint   Patient presents with    Urinary Tract Infection     X 4-5 days     Sinusitis     X 2 weeks        HPI  HPI:    Patient presents today for multiple. Has had UTI symptoms for 4 to 5 days dysuria urgency frequency. Had some vaginal spotting this week and emphasize importance of following with GYN. Has had chronic microscopic hematuria, has seen urologist in the past with negative work-up. Also has had sinus pressure thick rhinorrhea postnasal drainage for 2 weeks no fever chills cough chest pain shortness breath wheezing abdominal symptoms    Mentions her sinuses always feel \"full\" I did discuss chronic sinusitis/polyps and CT scan but then states that it tends to get better after antibiotic. She will let us know if persists or worsens    Following with [facility] neurologist.  They increased her Effexor. Did a work-up. She states her sodium was high. States she does not consume much salt and drinks a lot of water. She did not get the blood work that I ordered in 2022 and emphasize importance of this. After that she was in the ER had mildly high white count and recommended again blood work to follow-up on this.     No other complaints or concerns this time, denies headache syncope near syncope chest pain palpitations abdominal pain nausea vomiting change in bowels numbness tingling focal weakness slurred speech facial droop or otherwise      ROS:  As above      Current Outpatient Medications:     venlafaxine (EFFEXOR XR) 75 MG extended release capsule, Take 75 mg by mouth daily, Disp: , Rfl:     cefdinir (OMNICEF) 300 MG capsule, Take 1 capsule by mouth 2 times daily for 10 days, Disp: 20 capsule, Rfl: 0  Allergies   Allergen Reactions    Morphine      Reaction is reddened skin and patient \"blacks out\"    Oxycodone Anxiety    Tomato Anaphylaxis    Alprazolam      Reaction is hallucinations  hallucinations Hydrocodone-Acetaminophen      GI upset    Influenza Vaccines      ? Neuro issue and neurologist said not to get again    Oseltamivir     Oxycontin [Oxycodone Hcl]      Reaction is reddened skin and patient \"blacks out\"    Tamiflu [Oseltamivir Phosphate]      Reaction is hallucinations    Topiramate      Weakness, memory loss, nausea/emesis, sweats    Adhesive Tape Rash     Reaction is sloughing of skin  Skin comes off    Oseltamivir Phosphate Nausea And Vomiting       Past Medical History:   Diagnosis Date    Blood in urine     history of    Constipation     Endometriosis     H/O superficial phlebitis 4/29/2021    Migraines     Raynaud's disease     Superficial phlebitis of leg, right 9/30/2020    Varicose veins of leg with pain, right 9/30/2020     Past Surgical History:   Procedure Laterality Date    ANKLE SURGERY      APPENDECTOMY      CHOLECYSTECTOMY      CYSTOSCOPY      HERNIA REPAIR      LAPAROSCOPY      multiple for endometriosis    TONSILLECTOMY       Family History   Problem Relation Age of Onset    Heart Disease Mother     Thyroid Disease Mother     Other Mother         kidney problems    Heart Disease Father     Allergy (Severe) Father         seasonal    Other Maternal Grandmother         brain tumor    Other Maternal Aunt         aneurysm    Cancer Paternal Cousin         ovarian      Social History     Tobacco Use    Smoking status: Never    Smokeless tobacco: Never   Vaping Use    Vaping Use: Never used   Substance Use Topics    Alcohol use: No    Drug use: No      Social History     Social History Narrative    PMH:    Medical Problems:    Endometriosis, Raynauds, Migraine    Surgical Hx:    Appendectomy, Removal of Gallbladder    Laparoscopy - Endometriosis    Hernia Repair - Hiatal    Lt ankle -- several surgeries, cartilage removal    Reviewed, no changes. FH:    Father:    Seasonal Allergies, Heart Disease. Mother:    Heart Disease. paternal cousin ovarian cancer in 42's;    maternal aunt soto aneurysm    maternal grandmother brain tumor    Reviewed, no changes. SH:    Marital: Single. Work Status: Full-Time Employment - - [employer]. Personal Habits: Cigarette Use: Nonsmoker. Alcohol: Never used alcohol. Vitals:    03/09/23 1042   BP: 122/70   Pulse: 74   Temp: 97.3 °F (36.3 °C)   SpO2: 97%   Weight: 183 lb (83 kg)     Wt Readings from Last 3 Encounters:   03/09/23 183 lb (83 kg)   11/30/22 184 lb (83.5 kg)   11/30/22 184 lb 9.6 oz (83.7 kg)        Physical Exam    Exam:  Const: Appears comfortable. No signs of acute distress present. Head/Face: Atraumatic, normocephalic on inspection. Eyes: No discharge from the eyes. Sclerae clear. ENMT: Ears fluid nose boggy oropharynx thick postnasal drainage exudate  Neck: Supple. Palpation reveals no adenopathy. No masses appreciated. No JVD. No further tenderness around parotid or cervical chain  Resp: Respirations are unlabored. Clear to auscultation bilaterally. No rales, rhonchi or wheezes appreciated over the  lungs bilaterally. CV: RRR   Extremities: No clubbing or cyanosis. No edema of the lower limbs  bilaterally. No calf inflammation or tenderness. Abdomen: Abdomen is soft, nontender, and nondistended. No abdominal masses  appreciated. No palpable hepatosplenomegaly. Bowel sounds are normoactive. Skin: Dry and warm with no rash. Muscular skeletal: No acute joint inflammation.   Neuro:Grossly intact without focal deficit  No CVA tenderness    Office Labs This Visit :  Results for orders placed or performed in visit on 03/09/23   POCT Urinalysis no Micro   Result Value Ref Range    Color, UA yellow     Clarity, UA clear     Glucose, UA POC negative     Bilirubin, UA negative     Ketones, UA negative     Spec Grav, UA 1.015     Blood, UA POC small (H)     pH, UA 7.0     Protein, UA POC negative     Urobilinogen, UA 0.2     Leukocytes, UA moderate (H)     Nitrite, UA negative                     Assessment and Plan: Diagnosis Orders   1. Urinary tract infection with hematuria, site unspecified  POCT Urinalysis no Micro    Culture, Urine    cefdinir (OMNICEF) 300 MG capsule      2. Acute bacterial sinusitis  cefdinir (OMNICEF) 300 MG capsule      3. Hematuria, unspecified type        4. Other migraine with status migrainosus, intractable        5. Leukocytosis, unspecified type        6. Hypernatremia        7. Squamous cell carcinoma of head and neck (HCC)          Squamous cell carcinoma of head and neck (HCC)  Resected advanced derm summer 2022. [place] Also basal cell resected left knee oct 2022. Follows regularly. UTI/hematuria  Counseled, differential reviewed. Urinalysis reviewed. Start cefdinir precaution clued C. difficile, risk benefits of probiotic reviewed. Counseled on role of cranberry, Azo. Proper hydration. Importance follow-up reviewed send culture adjust as necessary. Hematuria  .casa, she states chronic, has seen urologist and had negative evaluation the past including imaging cystoscopy etc.      Sinusitis  Counseled. Differential reviewed. Antibiotic as per EMR, standard precautions reviewed including C. Difficile. R/B probiotics reviewed. Mucinex as directed with precautions. Potential interactions reviewed. Proper hydration reviewed. Coolmist vaporizer as directed. Mono precautions reviewed. Counseled on nasal saline. Counseled on nasal steroid. Defers other testing, precaution reviewed including COVID precautions. Cefdinir prescribed    Hypernatremia  States she had high sodium level on [facility] blood work. Counseled on low sodium intake, proper hydration. Asymptomatic. Repeat    Leukocytosis  Noted on blood work in [facility] 19 from November. She is going to repeat blood work           Migraine  Counseled extensively. Differential reviewed, including serious etiologies. Has been following with neurologist [facility], they increased her Effexor and did work-up she will bring in a copy of this.   Avoid migraine triggers. In the past she had a significant adverse reaction to Topamax and no longer taking. In the past has had prednisone, methylprednisolone shot Toradol, Benadryl and Phenergan shot with good results. Has Imitrex with standard precautions including interactions, serotonin syndrome, vasoconstrictive properties. Tends to work well. Appropriate way of taking reviewed. Nose runs after migraine. Discussed vasomotor rhinitis. Previously we prescribed low-dose gabapentin 100 mg nightly. After reading the side effects she decided not to take and is asymptomatic now. Recommended avoidance of triggers . Counseled on analgesic rebound ha's. higher risk of cva reviewed. signs and symptoms to watch for discussed. Serious signs and symptoms  reviewed. ER if any. November 2021 MRI    Uses Zofran periodically for nausea with migraine, side effects instructions ADRs reviewed    Pituitary neoplasm  Counseled extensively. Differential reviewed, including serious etiologies. Blood  work negative, normal prolactin 11/21. MRI 11/21  \"tiny 3 mm nonenhancing nodule in the left side of the pituitary, slightly decreased compared to 3/8/2016 that could represent a cyst or nonenhancing microadenoma, otherwise unremarkable\". defers seeing endocrinology or neurosurgery. Should follow-up neurology. MRI 3/16 again. Summary was              Plan as above. Counseled extensively and differential diagnoses relevant to above were reviewed, including serious etiologies, risks and complications, especially of left uncontrolled. If relevant, instructions and  alternatives to meds/treatment reviewed, as well as interactions, and  SE's/ADRs reviewed, notify immediately if any, discontinuing new meds if any. Plan made after discussion and shared decision making. Continue per [facility], [facility] [place]. Get blood work as ordered. Cefdinir as above. Send urine culture.   Follow-up 2 weeks sooner as needed        As long as symptoms steadily improve/resolve, and medical conditions follow the expected course, FU as below, sooner PRN. Return in about 2 weeks (around 3/23/2023), or if symptoms worsen or fail to improve. Educational materials and/or home exercises printed for patient's review and were included in patient instructions on his/her After Visit Summary and given to patient at the end of visit. After discussion, patient and/or guardian verbalizes understanding, agrees, feels comfortable with and wishes to proceed with above treatment plan. Call for any pending results, FU sooner if abnormal, as needed or if any current symptoms persist/worsen. Advised patient to call with any new medication issues, and read all Rx info from pharmacy to assure aware of all possible risks and side effects of medication before taking. Reviewed age and gender appropriate health screening exams and vaccinations. Advised patient regarding importance of keeping up with recommended health maintenance and to schedule as soon as possible if overdue, as this is important in assessing for undiagnosed pathology, especially cancer, as well as protecting against potentially harmful/life threatening disease. Patient and/or guardian verbalizes understanding and agrees with above counseling, assessment and plan. All questions answered. Over 30 minutes  spent with the patient in reviewing records, reviewing with patient/family, counseling, ordering,  prescribing, completing h&p, etc., with over 50% of the time spent face to face counseling. Signs and symptoms to watch for discussed, serious signs and symptoms reviewed. ER if any. Alma Navarro MD    Patients are advised to check with insurance company to ensure coverage and to fully understand benefits and cost prior to any testing. This note was created with the assistance of voice recognition software.   Document was reviewed however may contain grammatical errors.

## 2023-03-12 LAB
ORGANISM: ABNORMAL
URINE CULTURE, ROUTINE: ABNORMAL

## 2023-04-24 LAB — DIABETIC RETINOPATHY: NEGATIVE

## 2023-05-01 ENCOUNTER — TELEPHONE (OUTPATIENT)
Dept: PRIMARY CARE CLINIC | Age: 53
End: 2023-05-01

## 2023-05-01 ENCOUNTER — OFFICE VISIT (OUTPATIENT)
Dept: FAMILY MEDICINE CLINIC | Age: 53
End: 2023-05-01
Payer: COMMERCIAL

## 2023-05-01 VITALS
DIASTOLIC BLOOD PRESSURE: 64 MMHG | HEART RATE: 64 BPM | RESPIRATION RATE: 18 BRPM | HEIGHT: 65 IN | OXYGEN SATURATION: 97 % | SYSTOLIC BLOOD PRESSURE: 104 MMHG | BODY MASS INDEX: 28.32 KG/M2 | TEMPERATURE: 97.5 F | WEIGHT: 170 LBS

## 2023-05-01 DIAGNOSIS — R30.0 DYSURIA: ICD-10-CM

## 2023-05-01 DIAGNOSIS — R07.0 PAIN IN THROAT: ICD-10-CM

## 2023-05-01 DIAGNOSIS — R09.81 NASAL CONGESTION: ICD-10-CM

## 2023-05-01 DIAGNOSIS — H92.03 OTALGIA OF BOTH EARS: ICD-10-CM

## 2023-05-01 DIAGNOSIS — R09.82 POSTNASAL DRIP: ICD-10-CM

## 2023-05-01 DIAGNOSIS — R73.03 PREDIABETES: Primary | ICD-10-CM

## 2023-05-01 DIAGNOSIS — R31.9 URINARY TRACT INFECTION WITH HEMATURIA, SITE UNSPECIFIED: Primary | ICD-10-CM

## 2023-05-01 DIAGNOSIS — N39.0 URINARY TRACT INFECTION WITH HEMATURIA, SITE UNSPECIFIED: Primary | ICD-10-CM

## 2023-05-01 LAB
BILIRUBIN, POC: NORMAL
BLOOD URINE, POC: NORMAL
CLARITY, POC: CLEAR
COLOR, POC: YELLOW
GLUCOSE URINE, POC: NORMAL
KETONES, POC: NORMAL
LEUKOCYTE EST, POC: NORMAL
NITRITE, POC: NORMAL
PH, POC: 6
PROTEIN, POC: NORMAL
SPECIFIC GRAVITY, POC: >=1.03
UROBILINOGEN, POC: 0.2

## 2023-05-01 PROCEDURE — 81002 URINALYSIS NONAUTO W/O SCOPE: CPT | Performed by: PHYSICIAN ASSISTANT

## 2023-05-01 PROCEDURE — 99214 OFFICE O/P EST MOD 30 MIN: CPT | Performed by: PHYSICIAN ASSISTANT

## 2023-05-01 RX ORDER — CEFDINIR 300 MG/1
300 CAPSULE ORAL 2 TIMES DAILY
Qty: 20 CAPSULE | Refills: 0 | Status: SHIPPED | OUTPATIENT
Start: 2023-05-01 | End: 2023-05-11

## 2023-05-01 NOTE — TELEPHONE ENCOUNTER
Patient was told that she has prediabetes by neurologist. Has been reading what to eat if your blood sugar is low or is high, but she has no way of checking her glucose level. Asking if you can send over glucometer with supplies to pharmacy.      Dutch Castleman

## 2023-05-01 NOTE — PROGRESS NOTES
23  Corey Delgadillo : 1970 Sex: female  Age 47 y.o. Subjective:  Chief Complaint   Patient presents with    Otalgia     Did not want swabbed for strep    Pharyngitis    Dysuria    Drainage         HPI:   Corey Delgadillo , 47 y.o. female presents to express care for evaluation of ear pain, sore throat, drainage, and possible urinary tract infection    HPI  53-year-old female presents to express care for evaluation of 2 separate complaints. The patient's first complaint is that she believes that she has a UTI. The patient does have some increased frequency, urgency and burning with urination. The patient is not really having any abdominal pain, back pain, flank pain. The patient is not having vaginal bleeding or vaginal discharge. The patient is not currently on any antibiotics. The patient is also complaining of bilateral ear pain and sore throat with drainage. The patient has had the symptoms over the last several days. The patient is not having any fever, chills. No chest pain, shortness of breath. No cough. ROS:   Unless otherwise stated in this report the patient's positive and negative responses for review of systems for constitutional, eyes, ENT, cardiovascular, respiratory, gastrointestinal, neurological, , musculoskeletal, and integument systems and related systems to the presenting problem are either stated in the history of present illness or were not pertinent or were negative for the symptoms and/or complaints related to the presenting medical problem. Positives and pertinent negatives as per HPI. All others reviewed and are negative.       PMH:     Past Medical History:   Diagnosis Date    Blood in urine     history of    Constipation     Endometriosis     H/O superficial phlebitis 2021    Migraines     Raynaud's disease     Superficial phlebitis of leg, right 2020    Varicose veins of leg with pain, right 2020       Past Surgical History:

## 2023-06-19 ENCOUNTER — OFFICE VISIT (OUTPATIENT)
Dept: FAMILY MEDICINE CLINIC | Age: 53
End: 2023-06-19
Payer: COMMERCIAL

## 2023-06-19 VITALS
TEMPERATURE: 97.5 F | BODY MASS INDEX: 27.12 KG/M2 | SYSTOLIC BLOOD PRESSURE: 100 MMHG | DIASTOLIC BLOOD PRESSURE: 62 MMHG | OXYGEN SATURATION: 99 % | HEART RATE: 61 BPM | WEIGHT: 163 LBS

## 2023-06-19 DIAGNOSIS — J30.9 ALLERGIC RHINITIS, UNSPECIFIED SEASONALITY, UNSPECIFIED TRIGGER: ICD-10-CM

## 2023-06-19 DIAGNOSIS — H92.03 OTALGIA, BILATERAL: Primary | ICD-10-CM

## 2023-06-19 PROCEDURE — 99213 OFFICE O/P EST LOW 20 MIN: CPT | Performed by: PHYSICIAN ASSISTANT

## 2023-06-19 RX ORDER — PREDNISONE 10 MG/1
TABLET ORAL
Qty: 18 TABLET | Refills: 0 | Status: SHIPPED | OUTPATIENT
Start: 2023-06-19

## 2023-06-19 RX ORDER — MOMETASONE FUROATE 50 UG/1
2 SPRAY, METERED NASAL DAILY
Qty: 17 G | Refills: 0 | Status: SHIPPED | OUTPATIENT
Start: 2023-06-19

## 2023-06-19 RX ORDER — FEXOFENADINE HCL AND PSEUDOEPHEDRINE HCI 180; 240 MG/1; MG/1
1 TABLET, EXTENDED RELEASE ORAL DAILY
Qty: 14 TABLET | Refills: 0 | Status: SHIPPED | OUTPATIENT
Start: 2023-06-19

## 2023-06-19 NOTE — PROGRESS NOTES
23  Flavio Casey : 1970 Sex: female  Age 47 y.o. Subjective:  Chief Complaint   Patient presents with    Otalgia     bilateral    Drainage         HPI:   Flavio Casey , 47 y.o. female presents to express care for evaluation of bilateral ear pain    HPI  25-year-old female presents to express care for evaluation of bilateral ear pain. The patient has had this bilateral ear pain ongoing for the last for 5 months. The patient states that she has been seen and evaluated multiple different times for this. The patient is not having any drainage or discharge at the ears. She does have a little bit of congestion. The patient is not having any fevers. The left ear seems to be worse than the right. ROS:   Unless otherwise stated in this report the patient's positive and negative responses for review of systems for constitutional, eyes, ENT, cardiovascular, respiratory, gastrointestinal, neurological, , musculoskeletal, and integument systems and related systems to the presenting problem are either stated in the history of present illness or were not pertinent or were negative for the symptoms and/or complaints related to the presenting medical problem. Positives and pertinent negatives as per HPI. All others reviewed and are negative.       PMH:     Past Medical History:   Diagnosis Date    Blood in urine     history of    Constipation     Endometriosis     H/O superficial phlebitis 2021    Migraines     Raynaud's disease     Superficial phlebitis of leg, right 2020    Varicose veins of leg with pain, right 2020       Past Surgical History:   Procedure Laterality Date    ANKLE SURGERY      APPENDECTOMY      CHOLECYSTECTOMY      CYSTOSCOPY      HERNIA REPAIR      LAPAROSCOPY      multiple for endometriosis    TONSILLECTOMY         Family History   Problem Relation Age of Onset    Heart Disease Mother     Thyroid Disease Mother     Other Mother         kidney problems

## 2023-07-21 ENCOUNTER — OFFICE VISIT (OUTPATIENT)
Dept: FAMILY MEDICINE CLINIC | Age: 53
End: 2023-07-21
Payer: COMMERCIAL

## 2023-07-21 VITALS
SYSTOLIC BLOOD PRESSURE: 124 MMHG | OXYGEN SATURATION: 98 % | HEIGHT: 65 IN | DIASTOLIC BLOOD PRESSURE: 76 MMHG | TEMPERATURE: 97.6 F | WEIGHT: 159 LBS | BODY MASS INDEX: 26.49 KG/M2 | HEART RATE: 76 BPM

## 2023-07-21 DIAGNOSIS — J02.9 SORE THROAT: Primary | ICD-10-CM

## 2023-07-21 DIAGNOSIS — J01.90 ACUTE BACTERIAL SINUSITIS: ICD-10-CM

## 2023-07-21 DIAGNOSIS — B96.89 ACUTE BACTERIAL SINUSITIS: ICD-10-CM

## 2023-07-21 LAB — S PYO AG THROAT QL: NORMAL

## 2023-07-21 PROCEDURE — 99213 OFFICE O/P EST LOW 20 MIN: CPT | Performed by: FAMILY MEDICINE

## 2023-07-21 PROCEDURE — 87880 STREP A ASSAY W/OPTIC: CPT | Performed by: FAMILY MEDICINE

## 2023-07-21 RX ORDER — VENLAFAXINE HYDROCHLORIDE 75 MG/1
75 CAPSULE, EXTENDED RELEASE ORAL DAILY
Qty: 30 CAPSULE | Refills: 1 | Status: SHIPPED | OUTPATIENT
Start: 2023-07-21

## 2023-07-21 RX ORDER — AMOXICILLIN 875 MG/1
875 TABLET, COATED ORAL 2 TIMES DAILY
Qty: 14 TABLET | Refills: 0 | Status: SHIPPED | OUTPATIENT
Start: 2023-07-21 | End: 2023-07-28

## 2023-07-21 RX ORDER — METHYLPREDNISOLONE 4 MG/1
TABLET ORAL
Qty: 1 KIT | Refills: 0 | Status: SHIPPED | OUTPATIENT
Start: 2023-07-21

## 2023-07-21 ASSESSMENT — ENCOUNTER SYMPTOMS
EYES NEGATIVE: 1
SORE THROAT: 1
RESPIRATORY NEGATIVE: 1
GASTROINTESTINAL NEGATIVE: 1
TROUBLE SWALLOWING: 0

## 2023-07-21 NOTE — PROGRESS NOTES
Precious Horton (:  1970) is a 47 y.o. female,Established patient, here for evaluation of the following chief complaint(s):  Pharyngitis and Headache         ASSESSMENT/PLAN:  1. Sore throat  -     POCT rapid strep A  -     amoxicillin (AMOXIL) 875 MG tablet; Take 1 tablet by mouth 2 times daily for 7 days, Disp-14 tablet, R-0Normal  -     methylPREDNISolone (MEDROL DOSEPACK) 4 MG tablet; Take by mouth., Disp-1 kit, R-0Normal  2. Acute bacterial sinusitis  -     amoxicillin (AMOXIL) 875 MG tablet; Take 1 tablet by mouth 2 times daily for 7 days, Disp-14 tablet, R-0Normal  -     methylPREDNISolone (MEDROL DOSEPACK) 4 MG tablet; Take by mouth., Disp-1 kit, R-0Normal  Treat symptomatically at this time. Follow-up with PCP 1 to 2 weeks to ensure resolution. Red flags discussed if these occur return to clinic/emergency department. No follow-ups on file. Subjective   SUBJECTIVE/OBJECTIVE:  HPI  Presents today for several day history of worsening sore throat and headache. No fever or chills. No chest pain or shortness of breath. No loss of taste or smell. No nausea vomiting diarrhea. No known sick contacts or recent travel. She is concerned because she is supposed to watch her  next week. Strep test negative today. Review of Systems   Constitutional:  Negative for fever. HENT:  Positive for congestion, postnasal drip and sore throat. Negative for trouble swallowing. Eyes: Negative. Respiratory: Negative. Cardiovascular: Negative. Gastrointestinal: Negative. Musculoskeletal:  Negative for neck pain. Skin:  Negative for rash. Neurological:  Positive for headaches. Hematological:  Negative for adenopathy. All other systems reviewed and are negative.        Current Outpatient Medications:     venlafaxine (EFFEXOR XR) 75 MG extended release capsule, Take 1 capsule by mouth daily, Disp: 30 capsule, Rfl: 1    amoxicillin (AMOXIL) 875 MG tablet, Take 1 tablet by mouth

## 2023-08-07 ENCOUNTER — APPOINTMENT (OUTPATIENT)
Dept: GENERAL RADIOLOGY | Age: 53
End: 2023-08-07

## 2023-08-07 ENCOUNTER — HOSPITAL ENCOUNTER (EMERGENCY)
Age: 53
Discharge: HOME | End: 2023-08-07

## 2023-08-07 VITALS
TEMPERATURE: 98.4 F | WEIGHT: 169 LBS | DIASTOLIC BLOOD PRESSURE: 62 MMHG | HEIGHT: 66 IN | BODY MASS INDEX: 27.16 KG/M2 | SYSTOLIC BLOOD PRESSURE: 113 MMHG | HEART RATE: 66 BPM | RESPIRATION RATE: 14 BRPM | OXYGEN SATURATION: 100 %
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
## 2023-08-07 DIAGNOSIS — S82.64XA CLOSED NONDISPLACED FRACTURE OF LATERAL MALLEOLUS OF RIGHT FIBULA, INITIAL ENCOUNTER: Primary | ICD-10-CM

## 2023-08-07 PROCEDURE — 73610 X-RAY EXAM OF ANKLE: CPT

## 2023-08-07 PROCEDURE — 96372 THER/PROPH/DIAG INJ SC/IM: CPT

## 2023-08-07 PROCEDURE — 99284 EMERGENCY DEPT VISIT MOD MDM: CPT

## 2023-08-07 PROCEDURE — 29515 APPLICATION SHORT LEG SPLINT: CPT

## 2023-08-07 PROCEDURE — 6360000002 HC RX W HCPCS

## 2023-08-07 PROCEDURE — 6370000000 HC RX 637 (ALT 250 FOR IP)

## 2023-08-07 RX ORDER — ONDANSETRON 4 MG/1
4 TABLET, FILM COATED ORAL EVERY 8 HOURS PRN
Qty: 12 TABLET | Refills: 0 | Status: SHIPPED | OUTPATIENT
Start: 2023-08-07 | End: 2023-08-12

## 2023-08-07 RX ORDER — KETOROLAC TROMETHAMINE 30 MG/ML
30 INJECTION, SOLUTION INTRAMUSCULAR; INTRAVENOUS ONCE
Status: COMPLETED | OUTPATIENT
Start: 2023-08-07 | End: 2023-08-07

## 2023-08-07 RX ORDER — GINSENG 100 MG
CAPSULE ORAL ONCE
Status: COMPLETED | OUTPATIENT
Start: 2023-08-07 | End: 2023-08-07

## 2023-08-07 RX ORDER — TRAMADOL HYDROCHLORIDE 50 MG/1
50 TABLET ORAL EVERY 6 HOURS PRN
Qty: 12 TABLET | Refills: 0 | Status: SHIPPED | OUTPATIENT
Start: 2023-08-07 | End: 2023-08-08 | Stop reason: SDUPTHER

## 2023-08-07 RX ORDER — NAPROXEN 500 MG/1
500 TABLET ORAL 2 TIMES DAILY
Qty: 14 TABLET | Refills: 0 | Status: SHIPPED | OUTPATIENT
Start: 2023-08-07 | End: 2023-08-14

## 2023-08-07 RX ADMIN — BACITRACIN: 500 OINTMENT TOPICAL at 21:20

## 2023-08-07 RX ADMIN — KETOROLAC TROMETHAMINE 30 MG: 30 INJECTION, SOLUTION INTRAMUSCULAR; INTRAVENOUS at 21:19

## 2023-08-07 ASSESSMENT — PAIN DESCRIPTION - DESCRIPTORS: DESCRIPTORS: ACHING

## 2023-08-07 ASSESSMENT — PAIN SCALES - GENERAL
PAINLEVEL_OUTOF10: 8
PAINLEVEL_OUTOF10: 8

## 2023-08-07 ASSESSMENT — PAIN - FUNCTIONAL ASSESSMENT: PAIN_FUNCTIONAL_ASSESSMENT: 0-10

## 2023-08-07 ASSESSMENT — PAIN DESCRIPTION - ORIENTATION: ORIENTATION: RIGHT

## 2023-08-07 ASSESSMENT — PAIN DESCRIPTION - LOCATION: LOCATION: ANKLE

## 2023-08-08 RX ORDER — TRAMADOL HYDROCHLORIDE 50 MG/1
50 TABLET ORAL EVERY 6 HOURS PRN
Qty: 12 TABLET | Refills: 0 | Status: SHIPPED | OUTPATIENT
Start: 2023-08-08 | End: 2023-08-11

## 2023-08-08 NOTE — ED PROVIDER NOTES
[facility]  Department of Emergency Medicine   ED  Encounter Note  Admit Date/RoomTime: 2023  8:45 PM  ED Room:     NAME: Juan Carlos Neville  : 1970  MRN: [id number]     Chief Complaint:  Ankle Injury (Right, fell in parking lot)    History of Present Illness      Juan Carlos Neville is a 47 y.o. old female presenting to the emergency department by private vehicle alone, for evaluation of right ankle pain following a fall in  [facility] parking lot just prior to arrival.  She reportedly fell forward landing on her right palm and left knee. During the fall she twisted her right ankle. Since then she has been having sharp to throbbing pain, but has been able to bear weight with some difficulty. She reportedly was able to get herself up after the fall by pulling herself up on a pole in the lot. She has not taken anything for pain. She did drive herself here. Since onset the symptoms have been persistent and waxing and waning with ability to bear weight, but with some pain and swelling. Patient has no prior history of pain/injury with regards to today's visit. Her pain is aggraveated by any movement, pressure on or palpation of painful area, or weight bearing and relieved by nothing, as no treatment has been provided prior to this visit. She denies any head injury, headache, loss of consciousness, confusion, dizziness, neck pain, chest pain, abdominal pain, back pain, numbness, weakness, blurred vision, nausea, or vomiting. Tetanus Status: up to date. ROS   Pertinent positives and negatives are stated within HPI, all other systems reviewed and are negative. Past Medical History:  has a past medical history of Blood in urine, Constipation, Endometriosis, H/O superficial phlebitis, Migraines, Raynaud's disease, Superficial phlebitis of leg, right, and Varicose veins of leg with pain, right.     Surgical History:  has a past surgical history that includes

## 2023-10-09 ENCOUNTER — OFFICE VISIT (OUTPATIENT)
Dept: PRIMARY CARE CLINIC | Age: 53
End: 2023-10-09
Payer: COMMERCIAL

## 2023-10-09 VITALS
SYSTOLIC BLOOD PRESSURE: 134 MMHG | HEART RATE: 66 BPM | DIASTOLIC BLOOD PRESSURE: 72 MMHG | OXYGEN SATURATION: 95 % | TEMPERATURE: 97.5 F

## 2023-10-09 DIAGNOSIS — S43.402A SPRAIN OF LEFT SHOULDER, UNSPECIFIED SHOULDER SPRAIN TYPE, INITIAL ENCOUNTER: ICD-10-CM

## 2023-10-09 DIAGNOSIS — S82.891A CLOSED FRACTURE OF RIGHT ANKLE, INITIAL ENCOUNTER: ICD-10-CM

## 2023-10-09 DIAGNOSIS — G43.811 OTHER MIGRAINE WITH STATUS MIGRAINOSUS, INTRACTABLE: Primary | ICD-10-CM

## 2023-10-09 PROCEDURE — 96372 THER/PROPH/DIAG INJ SC/IM: CPT | Performed by: FAMILY MEDICINE

## 2023-10-09 PROCEDURE — 99214 OFFICE O/P EST MOD 30 MIN: CPT | Performed by: FAMILY MEDICINE

## 2023-10-09 RX ORDER — PREDNISONE 10 MG/1
TABLET ORAL
Qty: 12 TABLET | Refills: 0 | Status: SHIPPED | OUTPATIENT
Start: 2023-10-09 | End: 2023-10-14

## 2023-10-09 RX ORDER — METHYLPREDNISOLONE ACETATE 80 MG/ML
60 INJECTION, SUSPENSION INTRA-ARTICULAR; INTRALESIONAL; INTRAMUSCULAR; SOFT TISSUE ONCE
Status: COMPLETED | OUTPATIENT
Start: 2023-10-09 | End: 2023-10-09

## 2023-10-09 RX ORDER — ONDANSETRON 4 MG/1
4 TABLET, ORALLY DISINTEGRATING ORAL EVERY 8 HOURS PRN
Qty: 9 TABLET | Refills: 0 | Status: SHIPPED | OUTPATIENT
Start: 2023-10-09

## 2023-10-09 RX ADMIN — METHYLPREDNISOLONE ACETATE 60 MG: 80 INJECTION, SUSPENSION INTRA-ARTICULAR; INTRALESIONAL; INTRAMUSCULAR; SOFT TISSUE at 14:35

## 2023-10-09 NOTE — PROGRESS NOTES
Kathy Mohs : 1970 Sex: female  Age: 47 y.o. Chief Complaint   Patient presents with    Migraine     X 4 days        HPI  HPI:    Patient presents today with migraine since Thursday, i.e. 4 days. Positive photo phobia and phonophobia. Diffuse headache. Typical for her past migraines. No numbness tingling focal weakness slurred speech or facial droop. Discussed differential of headaches especially intractable. Role of ER and imaging/further work-up reviewed. Declines Denisha symptoms persist worsen. Took Toradol at home with some relief. States she has benefited from methylprednisolone injection and would like that. Previously Benadryl injection as well but does not wish that. Also fell [place] sustaining a fractured ankle she states, following with [facility] Dr. Lou Leyden, in walking boot. Also developed left reproducible shoulder pain at that time. She states she called [facility] today about her persistent ankle pain left shoulder pain and migraine and they recommended urgent care. I thought perhaps he meant for the migraine but now she realizes the likely meant there urgent care in regard to the musculoskeletal symptoms. She is going to go there after this. Denies calf pain or swelling. Denies blurred vision chest pain shortness of breath abdominal pain nausea vomiting change in bowels      ROS:  As above      Current Outpatient Medications:     TRAMADOL HCL PO, Take by mouth., Disp: , Rfl:     predniSONE (DELTASONE) 10 MG tablet, Take 3 tablets by mouth daily for 2 days, THEN 2 tablets daily for 2 days, THEN 1 tablet daily for 2 days. , Disp: 12 tablet, Rfl: 0    ondansetron (ZOFRAN-ODT) 4 MG disintegrating tablet, Place 1 tablet under the tongue every 8 hours as needed for Nausea or Vomiting, Disp: 9 tablet, Rfl: 0    venlafaxine (EFFEXOR XR) 75 MG extended release capsule, Take 1 capsule by mouth daily, Disp: 30 capsule, Rfl: 1    blood glucose

## 2023-12-20 DIAGNOSIS — D49.7 PITUITARY NEOPLASM: ICD-10-CM

## 2023-12-20 DIAGNOSIS — Z00.00 HEALTH MAINTENANCE EXAMINATION: ICD-10-CM

## 2023-12-20 DIAGNOSIS — E66.3 OVERWEIGHT (BMI 25.0-29.9): ICD-10-CM

## 2023-12-20 DIAGNOSIS — R73.03 PREDIABETES: ICD-10-CM

## 2023-12-20 DIAGNOSIS — R31.9 HEMATURIA, UNSPECIFIED TYPE: ICD-10-CM

## 2023-12-20 LAB
ABSOLUTE IMMATURE GRANULOCYTE: 0.03 K/UL (ref 0–0.58)
ALBUMIN SERPL-MCNC: 4.1 G/DL (ref 3.5–5.2)
ALP BLD-CCNC: 122 U/L (ref 35–104)
ALT SERPL-CCNC: 10 U/L (ref 0–32)
ANION GAP SERPL CALCULATED.3IONS-SCNC: 15 MMOL/L (ref 7–16)
AST SERPL-CCNC: 21 U/L (ref 0–31)
BASOPHILS ABSOLUTE: 0.05 K/UL (ref 0–0.2)
BASOPHILS RELATIVE PERCENT: 1 % (ref 0–2)
BILIRUB SERPL-MCNC: 0.6 MG/DL (ref 0–1.2)
BILIRUBIN URINE: NEGATIVE
BUN BLDV-MCNC: 14 MG/DL (ref 6–20)
CALCIUM SERPL-MCNC: 9.3 MG/DL (ref 8.6–10.2)
CHLORIDE BLD-SCNC: 103 MMOL/L (ref 98–107)
CHOLESTEROL: 199 MG/DL
CO2: 24 MMOL/L (ref 22–29)
COLOR: YELLOW
CREAT SERPL-MCNC: 0.7 MG/DL (ref 0.5–1)
EOSINOPHILS ABSOLUTE: 0.32 K/UL (ref 0.05–0.5)
EOSINOPHILS RELATIVE PERCENT: 5 % (ref 0–6)
EPITHELIAL CELLS UA: ABNORMAL /HPF
GFR SERPL CREATININE-BSD FRML MDRD: >60 ML/MIN/1.73M2
GLUCOSE BLD-MCNC: 76 MG/DL (ref 74–99)
GLUCOSE URINE: NEGATIVE MG/DL
HBA1C MFR BLD: 5.6 % (ref 4–5.6)
HCT VFR BLD CALC: 43 % (ref 34–48)
HDLC SERPL-MCNC: 73 MG/DL
HEMOGLOBIN: 13.8 G/DL (ref 11.5–15.5)
IMMATURE GRANULOCYTES: 1 % (ref 0–5)
KETONES, URINE: NEGATIVE MG/DL
LDL CHOLESTEROL: 110 MG/DL
LEUKOCYTE ESTERASE, URINE: ABNORMAL
LYMPHOCYTES ABSOLUTE: 2.27 K/UL (ref 1.5–4)
LYMPHOCYTES RELATIVE PERCENT: 34 % (ref 20–42)
MCH RBC QN AUTO: 29.2 PG (ref 26–35)
MCHC RBC AUTO-ENTMCNC: 32.1 G/DL (ref 32–34.5)
MCV RBC AUTO: 91.1 FL (ref 80–99.9)
MONOCYTES ABSOLUTE: 0.46 K/UL (ref 0.1–0.95)
MONOCYTES RELATIVE PERCENT: 7 % (ref 2–12)
NEUTROPHILS ABSOLUTE: 3.53 K/UL (ref 1.8–7.3)
NEUTROPHILS RELATIVE PERCENT: 53 % (ref 43–80)
NITRITE, URINE: NEGATIVE
PDW BLD-RTO: 13.2 % (ref 11.5–15)
PH UA: 6.5 (ref 5–9)
PLATELET # BLD: 261 K/UL (ref 130–450)
PMV BLD AUTO: 9.4 FL (ref 7–12)
POTASSIUM SERPL-SCNC: 4.5 MMOL/L (ref 3.5–5)
PROTEIN UA: NEGATIVE MG/DL
RBC # BLD: 4.72 M/UL (ref 3.5–5.5)
RBC UA: ABNORMAL /HPF
SODIUM BLD-SCNC: 142 MMOL/L (ref 132–146)
SPECIFIC GRAVITY UA: 1.02 (ref 1–1.03)
TOTAL PROTEIN: 6.8 G/DL (ref 6.4–8.3)
TRIGL SERPL-MCNC: 79 MG/DL
TSH SERPL DL<=0.05 MIU/L-ACNC: 1.84 UIU/ML (ref 0.27–4.2)
TURBIDITY: CLEAR
URINE HGB: ABNORMAL
UROBILINOGEN, URINE: 0.2 EU/DL (ref 0–1)
VLDLC SERPL CALC-MCNC: 16 MG/DL
WBC # BLD: 6.7 K/UL (ref 4.5–11.5)
WBC UA: ABNORMAL /HPF

## 2023-12-21 LAB — PROLACTIN: 10.57 NG/ML

## 2023-12-21 NOTE — RESULT ENCOUNTER NOTE
Alkaline phosphatase very minimally elevated, stable.  Microscopic blood in urine.  Notify if symptoms.  Keep follow-up as scheduled 1/12, sooner as needed

## 2024-01-12 ENCOUNTER — OFFICE VISIT (OUTPATIENT)
Dept: PRIMARY CARE CLINIC | Age: 54
End: 2024-01-12

## 2024-01-12 VITALS
WEIGHT: 178 LBS | HEIGHT: 66 IN | OXYGEN SATURATION: 97 % | SYSTOLIC BLOOD PRESSURE: 126 MMHG | TEMPERATURE: 98.6 F | DIASTOLIC BLOOD PRESSURE: 82 MMHG | BODY MASS INDEX: 28.61 KG/M2 | HEART RATE: 67 BPM

## 2024-01-12 DIAGNOSIS — Z00.00 HEALTH MAINTENANCE EXAMINATION: ICD-10-CM

## 2024-01-12 DIAGNOSIS — D49.7 PITUITARY NEOPLASM: ICD-10-CM

## 2024-01-12 DIAGNOSIS — J30.9 ALLERGIC RHINITIS, UNSPECIFIED SEASONALITY, UNSPECIFIED TRIGGER: ICD-10-CM

## 2024-01-12 DIAGNOSIS — R31.9 HEMATURIA, UNSPECIFIED TYPE: ICD-10-CM

## 2024-01-12 DIAGNOSIS — R73.03 PREDIABETES: ICD-10-CM

## 2024-01-12 DIAGNOSIS — G43.811 OTHER MIGRAINE WITH STATUS MIGRAINOSUS, INTRACTABLE: Primary | ICD-10-CM

## 2024-01-12 PROBLEM — C44.42 SQUAMOUS CELL CARCINOMA OF HEAD AND NECK: Status: RESOLVED | Noted: 2023-03-09 | Resolved: 2024-01-12

## 2024-01-12 PROBLEM — C76.0 SQUAMOUS CELL CARCINOMA OF HEAD AND NECK (HCC): Status: RESOLVED | Noted: 2023-03-09 | Resolved: 2024-01-12

## 2024-01-12 RX ORDER — PREDNISONE 10 MG/1
TABLET ORAL
Qty: 12 TABLET | Refills: 0 | Status: SHIPPED | OUTPATIENT
Start: 2024-01-12 | End: 2024-01-17

## 2024-01-12 RX ORDER — KETOROLAC TROMETHAMINE 30 MG/ML
30 INJECTION, SOLUTION INTRAMUSCULAR; INTRAVENOUS ONCE
Status: COMPLETED | OUTPATIENT
Start: 2024-01-12 | End: 2024-01-12

## 2024-01-12 RX ADMIN — KETOROLAC TROMETHAMINE 30 MG: 30 INJECTION, SOLUTION INTRAMUSCULAR; INTRAVENOUS at 11:41

## 2024-01-12 ASSESSMENT — PATIENT HEALTH QUESTIONNAIRE - PHQ9
SUM OF ALL RESPONSES TO PHQ QUESTIONS 1-9: 0
SUM OF ALL RESPONSES TO PHQ QUESTIONS 1-9: 0
1. LITTLE INTEREST OR PLEASURE IN DOING THINGS: 0
SUM OF ALL RESPONSES TO PHQ9 QUESTIONS 1 & 2: 0
2. FEELING DOWN, DEPRESSED OR HOPELESS: 0
SUM OF ALL RESPONSES TO PHQ QUESTIONS 1-9: 0
SUM OF ALL RESPONSES TO PHQ QUESTIONS 1-9: 0

## 2024-01-12 NOTE — PROGRESS NOTES
Joyce Cummins : 1970 Sex: female  Age: 53 y.o.    Chief Complaint   Patient presents with    Discuss Labs    Migraine     Pt is having a migraine today she is here to discuss labs.pt is requesting a \"migraine cocktail.\"       HPI  HPI:      Presents for follow-up.  Developed migraine today.  States she gets them about 3 times a week.  She states she is following with [facility] neurologist and she will follow-up.  They adjusted her medication and lowered her Effexor dose which helped she states.  She states workups been negative in the past.  I did recommend she get a follow-up MRI, she will discuss with them, I did discuss the pituitary microadenoma with her as well, defers now.  Has a migraine today.  Would like a \"cocktail\".  She agrees to Toradol which she has done well with in the past.    Still some nasal congestion but sinus pain pressure resolved.  No fever or chills.  No other complaints or concerns this time, denies syncope near syncope chest pain palpitations abdominal pain nausea vomiting change in bowels numbness tingling focal weakness slurred speech facial droop or otherwise    Importance of ER reviewed if any serious signs or symptoms.      ROS:  As above      Labs reviewed CMP normal except alk phos 122 and just once yearly monitoring, asymptomatic HDL 73  triglycerides 79 A1c 5.6 with glucose now only 76 prolactin 10.57 which has been stable overall CBC with differential normal TSH normal urinalysis chronic microscopic hematuria, defers other E/T      Most Recent Labs  CBC  Lab Results   Component Value Date/Time    WBC 6.7 2023 11:15 AM    WBC 12.8 2022 08:05 PM    WBC 6.0 2022 12:56 PM    RBC 4.72 2023 11:15 AM    RBC 5.08 2022 08:05 PM    RBC 4.76 2022 12:56 PM    HGB 13.8 2023 11:15 AM    HGB 14.9 2022 08:05 PM    HGB 13.6 2022 12:56 PM    HCT 43.0 2023 11:15 AM    HCT 46.0 2022 08:05 PM    HCT 43.9 2022 12:56

## 2024-03-07 ENCOUNTER — OFFICE VISIT (OUTPATIENT)
Dept: FAMILY MEDICINE CLINIC | Age: 54
End: 2024-03-07
Payer: COMMERCIAL

## 2024-03-07 VITALS
DIASTOLIC BLOOD PRESSURE: 88 MMHG | HEART RATE: 81 BPM | OXYGEN SATURATION: 96 % | TEMPERATURE: 98.2 F | WEIGHT: 178 LBS | HEIGHT: 66 IN | SYSTOLIC BLOOD PRESSURE: 132 MMHG | BODY MASS INDEX: 28.61 KG/M2

## 2024-03-07 DIAGNOSIS — G43.919 COMPLICATED MIGRAINE, INTRACTABLE: Primary | ICD-10-CM

## 2024-03-07 PROCEDURE — 96372 THER/PROPH/DIAG INJ SC/IM: CPT | Performed by: EMERGENCY MEDICINE

## 2024-03-07 PROCEDURE — 99214 OFFICE O/P EST MOD 30 MIN: CPT | Performed by: EMERGENCY MEDICINE

## 2024-03-07 RX ORDER — MAGNESIUM GLUCONATE 27 MG(500)
500 TABLET ORAL DAILY
COMMUNITY

## 2024-03-07 RX ORDER — METHYLPREDNISOLONE ACETATE 40 MG/ML
40 INJECTION, SUSPENSION INTRA-ARTICULAR; INTRALESIONAL; INTRAMUSCULAR; SOFT TISSUE ONCE
Status: COMPLETED | OUTPATIENT
Start: 2024-03-07 | End: 2024-03-07

## 2024-03-07 RX ORDER — ZINC GLUCONATE 50 MG
50 TABLET ORAL DAILY
COMMUNITY

## 2024-03-07 RX ORDER — KETOROLAC TROMETHAMINE 30 MG/ML
30 INJECTION, SOLUTION INTRAMUSCULAR; INTRAVENOUS ONCE
Status: COMPLETED | OUTPATIENT
Start: 2024-03-07 | End: 2024-03-07

## 2024-03-07 RX ADMIN — KETOROLAC TROMETHAMINE 30 MG: 30 INJECTION, SOLUTION INTRAMUSCULAR; INTRAVENOUS at 09:28

## 2024-03-07 RX ADMIN — METHYLPREDNISOLONE ACETATE 40 MG: 40 INJECTION, SUSPENSION INTRA-ARTICULAR; INTRALESIONAL; INTRAMUSCULAR; SOFT TISSUE at 09:29

## 2024-03-07 ASSESSMENT — ENCOUNTER SYMPTOMS
ABDOMINAL DISTENTION: 0
SHORTNESS OF BREATH: 0
DIARRHEA: 0
BACK PAIN: 0
NAUSEA: 0
EYE DISCHARGE: 0
PHOTOPHOBIA: 1
SORE THROAT: 0
SINUS PRESSURE: 0
EYE REDNESS: 0
VOMITING: 0
COUGH: 0
EYE PAIN: 0
WHEEZING: 0

## 2024-03-07 NOTE — PROGRESS NOTES
Mouth/Throat:      Pharynx: Uvula midline.   Eyes:      General: Lids are normal.      Conjunctiva/sclera: Conjunctivae normal.      Pupils: Pupils are equal, round, and reactive to light.   Cardiovascular:      Rate and Rhythm: Normal rate and regular rhythm.      Heart sounds: Normal heart sounds. No murmur heard.  Pulmonary:      Effort: Pulmonary effort is normal.      Breath sounds: Normal breath sounds.   Abdominal:      General: Bowel sounds are normal.      Palpations: Abdomen is soft. Abdomen is not rigid.      Tenderness: There is no abdominal tenderness. There is no guarding or rebound.   Musculoskeletal:      Cervical back: Normal range of motion and neck supple.   Skin:     General: Skin is warm and dry.      Findings: No abrasion or rash.   Neurological:      General: No focal deficit present.      Mental Status: She is alert and oriented to person, place, and time.      GCS: GCS eye subscore is 4. GCS verbal subscore is 5. GCS motor subscore is 6.      Cranial Nerves: Cranial nerves 2-12 are intact. No cranial nerve deficit.      Sensory: Sensation is intact. No sensory deficit.      Coordination: Coordination is intact. Coordination normal.      Gait: Gait is intact. Gait normal.         Test Results Section   (All laboratory and radiology results have been personally reviewed by myself)  Labs:  No results found for this visit on 03/07/24.     Imaging:  All Radiology results interpreted by Radiologist unless otherwise noted.  No results found.      Assessment / Plan   Impression(s):  Joyce was seen today for migraine.    Diagnoses and all orders for this visit:    Complicated migraine, intractable  -     ketorolac (TORADOL) injection 30 mg  -     methylPREDNISolone acetate (DEPO-MEDROL) injection 40 mg         Discussed symptomatic treatments with the patient today.   Return if symptoms worsen or fail to improve.   Red flag symptoms were also discussed with the patient today. If symptoms worsen

## 2024-05-10 ENCOUNTER — HOSPITAL ENCOUNTER (EMERGENCY)
Age: 54
Discharge: HOME | End: 2024-05-10
Attending: STUDENT IN AN ORGANIZED HEALTH CARE EDUCATION/TRAINING PROGRAM
Payer: COMMERCIAL

## 2024-05-10 VITALS
HEART RATE: 66 BPM | RESPIRATION RATE: 16 BRPM | WEIGHT: 175 LBS | DIASTOLIC BLOOD PRESSURE: 72 MMHG | SYSTOLIC BLOOD PRESSURE: 132 MMHG | OXYGEN SATURATION: 98 % | TEMPERATURE: 98 F | BODY MASS INDEX: 28.12 KG/M2 | HEIGHT: 66 IN

## 2024-05-10 DIAGNOSIS — R51.9 ACUTE NONINTRACTABLE HEADACHE, UNSPECIFIED HEADACHE TYPE: Primary | ICD-10-CM

## 2024-05-10 PROCEDURE — 99284 EMERGENCY DEPT VISIT MOD MDM: CPT

## 2024-05-10 PROCEDURE — 6360000002 HC RX W HCPCS: Performed by: STUDENT IN AN ORGANIZED HEALTH CARE EDUCATION/TRAINING PROGRAM

## 2024-05-10 PROCEDURE — 96365 THER/PROPH/DIAG IV INF INIT: CPT

## 2024-05-10 PROCEDURE — 96375 TX/PRO/DX INJ NEW DRUG ADDON: CPT

## 2024-05-10 RX ORDER — DIPHENHYDRAMINE HYDROCHLORIDE 50 MG/ML
25 INJECTION INTRAMUSCULAR; INTRAVENOUS ONCE
Status: COMPLETED | OUTPATIENT
Start: 2024-05-10 | End: 2024-05-10

## 2024-05-10 RX ORDER — METOCLOPRAMIDE HYDROCHLORIDE 5 MG/ML
10 INJECTION INTRAMUSCULAR; INTRAVENOUS ONCE
Status: COMPLETED | OUTPATIENT
Start: 2024-05-10 | End: 2024-05-10

## 2024-05-10 RX ORDER — MAGNESIUM SULFATE IN WATER 40 MG/ML
2000 INJECTION, SOLUTION INTRAVENOUS ONCE
Status: COMPLETED | OUTPATIENT
Start: 2024-05-10 | End: 2024-05-10

## 2024-05-10 RX ADMIN — METOCLOPRAMIDE 10 MG: 5 INJECTION, SOLUTION INTRAMUSCULAR; INTRAVENOUS at 20:27

## 2024-05-10 RX ADMIN — MAGNESIUM SULFATE HEPTAHYDRATE 2000 MG: 40 INJECTION, SOLUTION INTRAVENOUS at 20:30

## 2024-05-10 RX ADMIN — DIPHENHYDRAMINE HYDROCHLORIDE 25 MG: 50 INJECTION INTRAMUSCULAR; INTRAVENOUS at 20:27

## 2024-05-10 ASSESSMENT — PAIN DESCRIPTION - DESCRIPTORS
DESCRIPTORS: SHARP;SHOOTING;SORE
DESCRIPTORS: ACHING

## 2024-05-10 ASSESSMENT — PAIN DESCRIPTION - FREQUENCY
FREQUENCY: CONTINUOUS
FREQUENCY: CONTINUOUS

## 2024-05-10 ASSESSMENT — PAIN DESCRIPTION - PAIN TYPE
TYPE: ACUTE PAIN
TYPE: ACUTE PAIN

## 2024-05-10 ASSESSMENT — LIFESTYLE VARIABLES
HOW MANY STANDARD DRINKS CONTAINING ALCOHOL DO YOU HAVE ON A TYPICAL DAY: PATIENT DOES NOT DRINK
HOW OFTEN DO YOU HAVE A DRINK CONTAINING ALCOHOL: NEVER

## 2024-05-10 ASSESSMENT — ENCOUNTER SYMPTOMS
DIARRHEA: 0
COLOR CHANGE: 0
NAUSEA: 0
SHORTNESS OF BREATH: 0
COUGH: 0
BACK PAIN: 0
VOMITING: 0
ABDOMINAL PAIN: 0

## 2024-05-10 ASSESSMENT — PAIN DESCRIPTION - ONSET
ONSET: ON-GOING
ONSET: ON-GOING

## 2024-05-10 ASSESSMENT — PAIN - FUNCTIONAL ASSESSMENT
PAIN_FUNCTIONAL_ASSESSMENT: NONE - DENIES PAIN
PAIN_FUNCTIONAL_ASSESSMENT: ACTIVITIES ARE NOT PREVENTED
PAIN_FUNCTIONAL_ASSESSMENT: ACTIVITIES ARE NOT PREVENTED
PAIN_FUNCTIONAL_ASSESSMENT: 0-10
PAIN_FUNCTIONAL_ASSESSMENT: 0-10

## 2024-05-10 ASSESSMENT — PAIN DESCRIPTION - LOCATION
LOCATION: HEAD
LOCATION: HEAD

## 2024-05-10 ASSESSMENT — PAIN DESCRIPTION - ORIENTATION: ORIENTATION: RIGHT

## 2024-05-10 ASSESSMENT — PAIN SCALES - GENERAL
PAINLEVEL_OUTOF10: 6
PAINLEVEL_OUTOF10: 8

## 2024-05-11 NOTE — ED PROVIDER NOTES
discharge with outpatient follow-up.     The plan has been discussed in detail and they are aware of the specific conditions for emergent return, as well as the importance of follow-up.      Discharge Medication List as of 5/10/2024 11:14 PM          Diagnosis:  1. Acute nonintractable headache, unspecified headache type        Disposition:  Patient's disposition: Discharge to home  Patient's condition is stable.            Dmitri Vargas DO  05/11/24 0109

## 2024-05-21 NOTE — PROGRESS NOTES
Chief Complaint:   Chief Complaint   Patient presents with    Consultation     new pt. rt. supoerficial phlebitis         HPI: Patient came to the office by herself, for evaluation of vascular status of the right leg, history of varicose veins of the right leg, that have been present for last several years, recently getting worse, a month ago developed a pain redness over the medial aspect right leg just below the knee, underwent evaluation by her PCP, was suspected of superficial thrombophlebitis, did undergo venous ultrasound study, revealed no evidence of deep vein thrombosis, patient was started on low-dose aspirin and referred by her PCP for further evaluation    Patient denies any previous history of thrombophlebitis or deep vein thrombosis    Patient has issues with migraine headaches, fibroid uterus, endometriosis etc.      Patient denies any focal lateralizing neurological symptoms like loss of speech, vision or loss of function of extremity    Patient can walk a few blocks , and denies any symptoms of rest pain    Allergies   Allergen Reactions    Morphine      Reaction is reddened skin and patient \"blacks out\"    Oxycodone Anxiety    Tomato Anaphylaxis    Alprazolam      Reaction is hallucinations  hallucinations    Hydrocodone-Acetaminophen      GI upset    Influenza Vaccines      ?  Neuro issue and neurologist said not to get again    Oseltamivir     Oxycontin [Oxycodone Hcl]      Reaction is reddened skin and patient \"blacks out\"    Tamiflu [Oseltamivir Phosphate]      Reaction is hallucinations    Adhesive Tape Rash     Reaction is sloughing of skin  Skin comes off    Oseltamivir Phosphate Nausea And Vomiting       Current Outpatient Medications   Medication Sig Dispense Refill    Elastic Bandages & Supports (JOBST KNEE HIGH COMPRESSION SM) MISC Knee high with 20- 30 mmhg of compression 1 each 10    aspirin 81 MG EC tablet Take 81 mg by mouth daily       No current facility-administered medications for this visit.         Past Medical History:   Diagnosis Date    Blood in urine     history of    Constipation     Endometriosis     Migraines     Raynaud's disease     Superficial phlebitis of leg, right 9/30/2020    Varicose veins of leg with pain, right 9/30/2020       Past Surgical History:   Procedure Laterality Date    ANKLE SURGERY      APPENDECTOMY      CHOLECYSTECTOMY      CYSTOSCOPY      HERNIA REPAIR      LAPAROSCOPY      multiple for endometriosis    TONSILLECTOMY         Family History   Problem Relation Age of Onset    Heart Disease Mother     Thyroid Disease Mother     Other Mother         kidney problems    Heart Disease Father     Allergy (Severe) Father         seasonal    Other Maternal Grandmother         brain tumor    Other Maternal Aunt         aneurysm    Cancer Paternal Cousin         ovarian        Social History     Socioeconomic History    Marital status: Single     Spouse name: Not on file    Number of children: Not on file    Years of education: Not on file    Highest education level: Not on file   Occupational History    Not on file   Social Needs    Financial resource strain: Not on file    Food insecurity     Worry: Not on file     Inability: Not on file    Transportation needs     Medical: Not on file     Non-medical: Not on file   Tobacco Use    Smoking status: Never Smoker    Smokeless tobacco: Never Used   Substance and Sexual Activity    Alcohol use: No     Frequency: Never    Drug use: No    Sexual activity: Not on file   Lifestyle    Physical activity     Days per week: Not on file     Minutes per session: Not on file    Stress: Not on file   Relationships    Social connections     Talks on phone: Not on file     Gets together: Not on file     Attends [employer] service: Not on file     Active member of club or organization: Not on file     Attends meetings of clubs or organizations: Not on file     Relationship status: Not on file    Intimate partner violence     Fear of current or ex partner: Not on file     Emotionally abused: Not on file     Physically abused: Not on file     Forced sexual activity: Not on file   Other Topics Concern    Not on file   Social History Narrative    PMH:    Medical Problems:    Endometriosis, Raynauds, Migraine    Surgical Hx:    Appendectomy, Removal of Gallbladder    Laparoscopy - Endometriosis    Hernia Repair - Hiatal    Lt ankle -- several surgeries, cartilage removal    Reviewed, no changes. FH:    Father:    Seasonal Allergies, Heart Disease. Mother:    Heart Disease. paternal cousin ovarian cancer in 42's;    maternal aunt berry aneurysm    maternal grandmother brain tumor    Reviewed, no changes. SH:    Marital: Single. Work Status: Full-Time Employment - - [employer]. Personal Habits: Cigarette Use: Nonsmoker. Alcohol: Never used alcohol. Review of Systems:  Skin:  No abnormal pigmentation or rash  Eyes:  No blurring, diplopia or vision loss  Ears/Nose/Throat:  No hearing loss or vertigo  Respiratory:  No cough, pleuritic chest pain, dyspnea, or wheezing. Cardiovascular: No angina, palpitations . Gastrointestinal:  No nausea or vomiting; no abdominal pain or rectal bleeding  Musculoskeletal:  No arthritis or weakness. Neurologic:  No paralysis, paresis, paresthesia, seizures or headaches  Hematologic/Lymphatic/Immunologic:  No anemia, abnormal bleeding/bruising, fever, chills or night sweats. Endocrine:  No heat or cold intolerance. No polyphagia, polydipsia or polyuria. Physical Exam:  General appearance:  Alert, awake, oriented x 3. No distress. Skin:  Warm and dry  Head:  Normocephalic. No masses, lesions or tenderness  Eyes:  Conjunctivae appear normal; PERRL  Ears:  External ears normal  Nose/Sinuses:  Septum midline, mucosa normal; no drainage  Oropharynx:  Clear, no exudate noted  Neck:  No jugular venous distention, lymphadenopathy or thyromegaly.   No evidence of carotid bruit  Lungs:  Clear to ausculation bilaterally. No rhonchi, crackles, wheezes  Heart:  Regular rate and rhythm. No rub or murmur  Abdomen:  Soft, non-tender. No masses, organomegaly. Musculoskeletal : No joint effusions, tenderness swelling    Neuro: Speech is intact. Moving all extremities. No focal motor or sensory deficits      Extremities:  Both feet are warm to touch. The color of both feet is normal.    Area of resolving superficial thrombophlebitis noted over the right leg just below the knee, medial aspect with mild tenderness and induration    Patient does have moderately severe varicose veins over the anterior, medial aspect of right thigh medial aspect right calf and the lateral aspect of right calf with mild ankle edema      Pulses Right  Left    Brachial 3 3    Radial    3=normal   Femoral 2 2  2=diminished   Popliteal    1=barely palpable   Dorsalis pedis 2 2  0=absent   Posterior tibial    4=aneurysmal             Other pertinent information:1. The past medical records were reviewed. 2.  Venous ultrasound study that was done was personally reviewed by me, revealed no evidence of deep vein thrombosis    Assessment:    1. Varicose veins of leg with pain, right    2.  Superficial phlebitis of leg, right              Plan:          Discussed in detail with the patient, all options, risks benefits and alternatives were explained to the patient, patient recommended continue the low-dose aspirin for 6 weeks, if possible particularly as she is complaining of some bruising    Recommended her to continue to wear the compression stockings and call me pain if any increased symptoms in the future, at that time patient may need to undergo repeat venous ultrasound study to assess any venous reflux and incompetence of the saphenofemoral junction and then make appropriate recommendations based upon the findings of the ultrasound and if she has no symptoms in the future, explained       All the questions were answered.         Orders Placed This Encounter   Medications    Elastic Bandages & Supports (JOBST KNEE HIGH COMPRESSION SM) MISC     Sig: Knee high with 20- 30 mmhg of compression     Dispense:  1 each     Refill:  10           Indicated follow-up: Call PRN 4

## 2024-05-24 ENCOUNTER — OFFICE VISIT (OUTPATIENT)
Dept: FAMILY MEDICINE CLINIC | Age: 54
End: 2024-05-24
Payer: COMMERCIAL

## 2024-05-24 VITALS
WEIGHT: 182 LBS | SYSTOLIC BLOOD PRESSURE: 124 MMHG | BODY MASS INDEX: 29.38 KG/M2 | OXYGEN SATURATION: 98 % | HEART RATE: 72 BPM | DIASTOLIC BLOOD PRESSURE: 80 MMHG | TEMPERATURE: 97 F

## 2024-05-24 DIAGNOSIS — B96.89 ACUTE BACTERIAL SINUSITIS: Primary | ICD-10-CM

## 2024-05-24 DIAGNOSIS — J01.90 ACUTE BACTERIAL SINUSITIS: Primary | ICD-10-CM

## 2024-05-24 DIAGNOSIS — G43.811 OTHER MIGRAINE WITH STATUS MIGRAINOSUS, INTRACTABLE: ICD-10-CM

## 2024-05-24 PROCEDURE — 99213 OFFICE O/P EST LOW 20 MIN: CPT | Performed by: FAMILY MEDICINE

## 2024-05-24 PROCEDURE — 96372 THER/PROPH/DIAG INJ SC/IM: CPT | Performed by: FAMILY MEDICINE

## 2024-05-24 RX ORDER — KETOROLAC TROMETHAMINE 30 MG/ML
30 INJECTION, SOLUTION INTRAMUSCULAR; INTRAVENOUS ONCE
Status: COMPLETED | OUTPATIENT
Start: 2024-05-24 | End: 2024-05-24

## 2024-05-24 RX ORDER — AMOXICILLIN 875 MG/1
875 TABLET, COATED ORAL 2 TIMES DAILY
Qty: 14 TABLET | Refills: 0 | Status: SHIPPED | OUTPATIENT
Start: 2024-05-24 | End: 2024-05-31

## 2024-05-24 RX ORDER — PREDNISONE 10 MG/1
TABLET ORAL
Qty: 30 TABLET | Refills: 0 | Status: SHIPPED | OUTPATIENT
Start: 2024-05-24

## 2024-05-24 RX ORDER — DEXAMETHASONE SODIUM PHOSPHATE 10 MG/ML
10 INJECTION INTRAMUSCULAR; INTRAVENOUS ONCE
Status: COMPLETED | OUTPATIENT
Start: 2024-05-24 | End: 2024-05-24

## 2024-05-24 RX ADMIN — KETOROLAC TROMETHAMINE 30 MG: 30 INJECTION, SOLUTION INTRAMUSCULAR; INTRAVENOUS at 10:54

## 2024-05-24 RX ADMIN — DEXAMETHASONE SODIUM PHOSPHATE 10 MG: 10 INJECTION INTRAMUSCULAR; INTRAVENOUS at 10:53

## 2024-05-24 ASSESSMENT — ENCOUNTER SYMPTOMS
SORE THROAT: 0
NAUSEA: 0
SHORTNESS OF BREATH: 0
DIARRHEA: 0
VOMITING: 0
COUGH: 0
PHOTOPHOBIA: 0
BACK PAIN: 0
SINUS PAIN: 1
ABDOMINAL PAIN: 0
BLOOD IN STOOL: 0
CONSTIPATION: 0
SINUS PRESSURE: 1

## 2024-05-24 NOTE — PROGRESS NOTES
Joyce Cummins (:  1970) is a 53 y.o. female,Established patient, here for evaluation of the following chief complaint(s):  Sinusitis and Headache      Assessment & Plan   1. Acute bacterial sinusitis  -     ketorolac (TORADOL) injection 30 mg; 30 mg, IntraMUSCular, ONCE, 1 dose, On 24 at 1100Do not administer for more than 5 days  -     dexAMETHasone (DECADRON) injection 10 mg; 10 mg, IntraMUSCular, ONCE, 1 dose, On 24 at 1100  -     amoxicillin (AMOXIL) 875 MG tablet; Take 1 tablet by mouth 2 times daily for 7 days, Disp-14 tablet, R-0Normal  -     predniSONE (DELTASONE) 10 MG tablet; Take 4 tabs x 3 days, 3 tabs x 3 days, 2 tabs x 3 days, 1 tab x 3 days, stop., Disp-30 tablet, R-0Normal  2. Other migraine with status migrainosus, intractable  -     ketorolac (TORADOL) injection 30 mg; 30 mg, IntraMUSCular, ONCE, 1 dose, On 24 at 1100Do not administer for more than 5 days  -     dexAMETHasone (DECADRON) injection 10 mg; 10 mg, IntraMUSCular, ONCE, 1 dose, On 24 at 1100  -     amoxicillin (AMOXIL) 875 MG tablet; Take 1 tablet by mouth 2 times daily for 7 days, Disp-14 tablet, R-0Normal  -     predniSONE (DELTASONE) 10 MG tablet; Take 4 tabs x 3 days, 3 tabs x 3 days, 2 tabs x 3 days, 1 tab x 3 days, stop., Disp-30 tablet, R-0Normal  At this time we will treat symptomatically.  Follow-up with PCP in 1 to 2 weeks to ensure resolution.  Red flags discussed if these occur return to clinic/emergency department.    No follow-ups on file.       Subjective   HPI  Patient presents today for several day history of worsening sinus congestion and facial pain.  Patient states that this set off migraine.  Has been taking her home medications without any significant results/resolution.  No fever or chills.  No chest pain or shortness of breath.  No nausea vomiting or diarrhea.  No known sick contact or recent travel prior to symptoms beginning.      Review of Systems

## 2024-06-07 ENCOUNTER — OFFICE VISIT (OUTPATIENT)
Dept: FAMILY MEDICINE CLINIC | Age: 54
End: 2024-06-07
Payer: COMMERCIAL

## 2024-06-07 VITALS
OXYGEN SATURATION: 96 % | BODY MASS INDEX: 29.39 KG/M2 | HEIGHT: 66 IN | SYSTOLIC BLOOD PRESSURE: 118 MMHG | HEART RATE: 84 BPM | TEMPERATURE: 97.5 F | DIASTOLIC BLOOD PRESSURE: 68 MMHG

## 2024-06-07 DIAGNOSIS — J01.90 ACUTE BACTERIAL SINUSITIS: ICD-10-CM

## 2024-06-07 DIAGNOSIS — B96.89 ACUTE BACTERIAL SINUSITIS: ICD-10-CM

## 2024-06-07 DIAGNOSIS — J02.9 SORE THROAT: Primary | ICD-10-CM

## 2024-06-07 LAB
INFLUENZA A ANTIBODY: NEGATIVE
INFLUENZA B ANTIBODY: NEGATIVE
S PYO AG THROAT QL: NORMAL

## 2024-06-07 PROCEDURE — 99213 OFFICE O/P EST LOW 20 MIN: CPT | Performed by: FAMILY MEDICINE

## 2024-06-07 PROCEDURE — 87880 STREP A ASSAY W/OPTIC: CPT | Performed by: FAMILY MEDICINE

## 2024-06-07 PROCEDURE — 87804 INFLUENZA ASSAY W/OPTIC: CPT | Performed by: FAMILY MEDICINE

## 2024-06-07 RX ORDER — PSEUDOEPHEDRINE HCL 30 MG
30 TABLET ORAL EVERY 4 HOURS PRN
COMMUNITY

## 2024-06-07 RX ORDER — CEFDINIR 300 MG/1
300 CAPSULE ORAL 2 TIMES DAILY
Qty: 14 CAPSULE | Refills: 0 | Status: SHIPPED | OUTPATIENT
Start: 2024-06-07 | End: 2024-06-14

## 2024-06-07 RX ORDER — ONDANSETRON 4 MG/1
4 TABLET, ORALLY DISINTEGRATING ORAL 3 TIMES DAILY PRN
Qty: 21 TABLET | Refills: 0 | Status: SHIPPED | OUTPATIENT
Start: 2024-06-07

## 2024-06-07 RX ORDER — METHYLPREDNISOLONE 4 MG/1
TABLET ORAL
Qty: 1 KIT | Refills: 0 | Status: SHIPPED | OUTPATIENT
Start: 2024-06-07

## 2024-06-07 ASSESSMENT — ENCOUNTER SYMPTOMS
NAUSEA: 0
SHORTNESS OF BREATH: 0
VOMITING: 1
COUGH: 0
SORE THROAT: 0
PHOTOPHOBIA: 0
ABDOMINAL PAIN: 0
BLOOD IN STOOL: 0
SINUS PRESSURE: 1
DIARRHEA: 0
BACK PAIN: 0
SINUS PAIN: 1
CONSTIPATION: 0

## 2024-06-07 NOTE — PROGRESS NOTES
needed supplies to include: monitor, strips, lancing device, lancets, control solutions, alcohol swabs., Disp: 1 kit, Rfl: 12   Patient Active Problem List   Diagnosis    Weight gain    Overweight (BMI 25.0-29.9)    Allergic rhinitis    Migraine    Acute pain of both knees    Nontraumatic incomplete tear of right rotator cuff    Pituitary neoplasm    Hematuria    Varicose veins of both lower extremities with inflammation    Neoplasm of uncertain behavior of skin    Varicose veins of leg with pain, right    Elevated liver enzymes    H/O superficial phlebitis    Insomnia    Exposure to COVID-19 virus    Hypokalemia    Acute bacterial sinusitis    Pharyngitis    Nausea and vomiting     Past Medical History:   Diagnosis Date    Blood in urine     history of    Constipation     Endometriosis     H/O superficial phlebitis 04/29/2021    Migraines     Raynaud's disease     Squamous cell carcinoma of head and neck 03/09/2023    Superficial phlebitis of leg, right 09/30/2020    Varicose veins of leg with pain, right 09/30/2020     Past Surgical History:   Procedure Laterality Date    ANKLE SURGERY      APPENDECTOMY      CHOLECYSTECTOMY      CYSTOSCOPY      HERNIA REPAIR      LAPAROSCOPY      multiple for endometriosis    TONSILLECTOMY       Social History     Socioeconomic History    Marital status: Single     Spouse name: Not on file    Number of children: Not on file    Years of education: Not on file    Highest education level: Not on file   Occupational History    Not on file   Tobacco Use    Smoking status: Never    Smokeless tobacco: Never   Vaping Use    Vaping Use: Never used   Substance and Sexual Activity    Alcohol use: No    Drug use: No    Sexual activity: Not on file   Other Topics Concern    Not on file   Social History Narrative    PMH:    Medical Problems:    Endometriosis, Raynauds, Migraine    Surgical Hx:    Appendectomy, Removal of Gallbladder    Laparoscopy - Endometriosis    Hernia Repair - Hiatal    Lt

## 2024-06-17 ENCOUNTER — OFFICE VISIT (OUTPATIENT)
Dept: FAMILY MEDICINE CLINIC | Age: 54
End: 2024-06-17
Payer: COMMERCIAL

## 2024-06-17 VITALS
DIASTOLIC BLOOD PRESSURE: 80 MMHG | BODY MASS INDEX: 29.25 KG/M2 | SYSTOLIC BLOOD PRESSURE: 124 MMHG | OXYGEN SATURATION: 99 % | HEART RATE: 75 BPM | TEMPERATURE: 97.2 F | WEIGHT: 182 LBS | HEIGHT: 66 IN

## 2024-06-17 DIAGNOSIS — R21 RASH AND NONSPECIFIC SKIN ERUPTION: Primary | ICD-10-CM

## 2024-06-17 PROCEDURE — 96372 THER/PROPH/DIAG INJ SC/IM: CPT | Performed by: PHYSICIAN ASSISTANT

## 2024-06-17 PROCEDURE — 99214 OFFICE O/P EST MOD 30 MIN: CPT | Performed by: PHYSICIAN ASSISTANT

## 2024-06-17 RX ORDER — PREDNISONE 10 MG/1
TABLET ORAL
Qty: 18 TABLET | Refills: 0 | Status: SHIPPED | OUTPATIENT
Start: 2024-06-17

## 2024-06-17 RX ORDER — METHYLPREDNISOLONE ACETATE 80 MG/ML
80 INJECTION, SUSPENSION INTRA-ARTICULAR; INTRALESIONAL; INTRAMUSCULAR; SOFT TISSUE ONCE
Status: COMPLETED | OUTPATIENT
Start: 2024-06-17 | End: 2024-06-17

## 2024-06-17 RX ADMIN — METHYLPREDNISOLONE ACETATE 80 MG: 80 INJECTION, SUSPENSION INTRA-ARTICULAR; INTRALESIONAL; INTRAMUSCULAR; SOFT TISSUE at 14:39

## 2024-06-17 NOTE — PROGRESS NOTES
24  Joyce Cummins : 1970 Sex: female  Age 53 y.o.      Subjective:  Chief Complaint   Patient presents with    Rash     Poison sumac??         HPI:   HPI  oJyce Cummins , 53 y.o. female presents to express care for evaluation of poison sumac.  The patient had been outside cutting down a tree on Friday.  The patient states that the a rash seems to be spreading to the abdomen, back, upper torso, mostly to the forearm, the bilateral lower extremities.  States that it does itch intensely.  The patient is not have any fever, chills.  She has been using a baking soda paste to help.  The patient is not having any signs of anaphylaxis or angioedema.  The patient is not having any chest pain.      ROS:   Unless otherwise stated in this report the patient's positive and negative responses for review of systems for constitutional, eyes, ENT, cardiovascular, respiratory, gastrointestinal, neurological, , musculoskeletal, and integument systems and related systems to the presenting problem are either stated in the history of present illness or were not pertinent or were negative for the symptoms and/or complaints related to the presenting medical problem.  Positives and pertinent negatives as per HPI.  All others reviewed and are negative.      PMH:     Past Medical History:   Diagnosis Date    Blood in urine     history of    Constipation     Endometriosis     H/O superficial phlebitis 2021    Migraines     Raynaud's disease     Squamous cell carcinoma of head and neck 2023    Superficial phlebitis of leg, right 2020    Varicose veins of leg with pain, right 2020       Past Surgical History:   Procedure Laterality Date    ANKLE SURGERY      APPENDECTOMY      CHOLECYSTECTOMY      CYSTOSCOPY      HERNIA REPAIR      LAPAROSCOPY      multiple for endometriosis    TONSILLECTOMY         Family History   Problem Relation Age of Onset    Heart Disease Mother     Thyroid Disease Mother

## 2024-08-03 ENCOUNTER — APPOINTMENT (OUTPATIENT)
Dept: GENERAL RADIOLOGY | Age: 54
End: 2024-08-03
Payer: COMMERCIAL

## 2024-08-03 ENCOUNTER — HOSPITAL ENCOUNTER (EMERGENCY)
Age: 54
Discharge: HOME | End: 2024-08-03
Payer: COMMERCIAL

## 2024-08-03 VITALS
TEMPERATURE: 97.8 F | RESPIRATION RATE: 16 BRPM | HEART RATE: 83 BPM | OXYGEN SATURATION: 98 % | SYSTOLIC BLOOD PRESSURE: 135 MMHG | WEIGHT: 180 LBS | BODY MASS INDEX: 29.07 KG/M2 | DIASTOLIC BLOOD PRESSURE: 63 MMHG

## 2024-08-03 DIAGNOSIS — S92.501A CLOSED FRACTURE OF PHALANX OF RIGHT FIFTH TOE, INITIAL ENCOUNTER: Primary | ICD-10-CM

## 2024-08-03 PROCEDURE — 73630 X-RAY EXAM OF FOOT: CPT

## 2024-08-03 PROCEDURE — 99283 EMERGENCY DEPT VISIT LOW MDM: CPT

## 2024-08-03 ASSESSMENT — PAIN DESCRIPTION - FREQUENCY: FREQUENCY: CONTINUOUS

## 2024-08-03 ASSESSMENT — PAIN DESCRIPTION - ORIENTATION: ORIENTATION: RIGHT

## 2024-08-03 ASSESSMENT — LIFESTYLE VARIABLES
HOW OFTEN DO YOU HAVE A DRINK CONTAINING ALCOHOL: NEVER
HOW MANY STANDARD DRINKS CONTAINING ALCOHOL DO YOU HAVE ON A TYPICAL DAY: PATIENT DOES NOT DRINK

## 2024-08-03 ASSESSMENT — PAIN - FUNCTIONAL ASSESSMENT: PAIN_FUNCTIONAL_ASSESSMENT: 0-10

## 2024-08-03 ASSESSMENT — PAIN DESCRIPTION - LOCATION: LOCATION: FOOT;TOE (COMMENT WHICH ONE)

## 2024-08-03 ASSESSMENT — PAIN DESCRIPTION - DESCRIPTORS: DESCRIPTORS: ACHING;SORE;THROBBING;TENDER

## 2024-08-03 ASSESSMENT — PAIN SCALES - GENERAL: PAINLEVEL_OUTOF10: 10

## 2024-08-03 ASSESSMENT — PAIN DESCRIPTION - PAIN TYPE: TYPE: ACUTE PAIN

## 2024-08-03 ASSESSMENT — PAIN DESCRIPTION - ONSET: ONSET: ON-GOING

## 2024-08-03 NOTE — ED PROVIDER NOTES
Independent DEJUAN Visit.     [facility]  Department of Emergency Medicine   ED  Encounter Note  Admit Date/RoomTime: 8/3/2024  5:39 PM  ED Room: [facility]  NAME: Joyce Cummins  : 1970  MRN: [id number]     Chief Complaint:  Foot Pain (Right foot pain, smacked into a desk, felt the 5th toe crack happened yesterday)    HISTORY OF PRESENT ILLNESS        Joyce Cummins is a 53 y.o. female who presents to the ED with a right foot injury.  Injury occurred yesterday.  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  Patient states it felt like her fifth toe was shoved into her foot.  She presents with pain, swelling and bruising to her right foot.  Today she had to work a 12-hour shift and now it is more painful.   Symptoms are moderate to severe in intensity        ROS   Pertinent positives and negatives are stated within HPI, all other systems reviewed and are negative.    Past Medical History:  has a past medical history of Blood in urine, Constipation, Endometriosis, H/O superficial phlebitis, Migraines, Raynaud's disease, Squamous cell carcinoma of head and neck, Superficial phlebitis of leg, right, and Varicose veins of leg with pain, right.    Surgical History:  has a past surgical history that includes Appendectomy; hernia repair; Ankle surgery; Cholecystectomy; Tonsillectomy; laparoscopy; and Cystoscopy.    Social History:  reports that she has never smoked. She has never used smokeless tobacco. She reports that she does not drink alcohol and does not use drugs.    Family History: family history includes Allergy (Severe) in her father; Breast Cancer (age of onset: 70 - 79) in her maternal grandmother; Cancer in her paternal cousin; Heart Disease in her father and mother; Other in her maternal aunt, maternal grandmother, and mother; Thyroid Disease in her mother.     Allergies: Morphine, Oxycodone, Tomato,  Alprazolam, Hydrocodone-acetaminophen, Influenza vaccines, Oseltamivir, Oxycontin [oxycodone hcl], Tamiflu [oseltamivir phosphate], Topiramate, Adhesive tape, and Oseltamivir phosphate    PHYSICAL EXAM   Oxygen Saturation Interpretation: Normal on room air analysis.        ED Triage Vitals   BP Temp Temp Source Pulse Respirations SpO2 Height Weight - Scale   08/03/24 1722 08/03/24 1722 08/03/24 1722 08/03/24 1722 08/03/24 1722 08/03/24 1722 -- 08/03/24 1738   135/63 97.8 °F (36.6 °C) Oral 83 16 98 %  81.6 kg (180 lb)         General:  NAD.  Alert and Oriented.  Well-appearing.  Skin:  Warm, dry.  No rashes.  Head:  Normocephalic.  Atraumatic.  Eyes:  EOMI.  Conjunctiva normal.  ENT:  Oral mucosa moist.  Airway patent.  Neck:  Supple.  Normal ROM.    Respiratory:  No respiratory distress.  No labored breathing.  Lungs clear without rales, rhonchi or wheezing.  Cardiovascular:  Regular rate.  No Murmur.  No peripheral edema.  Extremities warm and good color.  Extremities:    Poor right foot with swollen ecchymotic third, fourth and fifth toes with bruising and swelling extending into the dorsum of the right foot.  She also has a fluid-filled blister to the lateral aspect of the right foot, probably ice application injury.  Medial and lateral malleolus are nontender to palpation.  Plantarflexion dorsiflexion intact at the right ankle.  2+ right dorsalis pedis pulse.    Back:  Normal ROM.  Nontender to palpation.  Neuro:  Alert and Oriented to person, place, time and situation.  Normal LOC.  Moves all extremities.  Speech fluent.  Psych:  Calm and Cooperative.  Normal thought process.  Normal judgement.    Lab / Imaging Results   (All laboratory and radiology results have been personally reviewed by myself)  Labs:  No results found for this visit on 08/03/24.  Imaging:  All Radiology results interpreted by Radiologist unless otherwise noted.  XR FOOT RIGHT (MIN 3 VIEWS)   Final Result   There is a complete transverse

## 2024-08-05 ENCOUNTER — TELEPHONE (OUTPATIENT)
Dept: PODIATRY | Age: 54
End: 2024-08-05

## 2024-08-05 NOTE — TELEPHONE ENCOUNTER
Patient was seen for Closed fracture of phalanx of right fifth toe, initial encounter . Asking to schedule with Dr Fiore. LOV 7/2022.  I scheduled for first available 8/13. Please contact patient if she needs seen sooner.

## 2024-08-13 ENCOUNTER — OFFICE VISIT (OUTPATIENT)
Dept: PODIATRY | Age: 54
End: 2024-08-13
Payer: COMMERCIAL

## 2024-08-13 VITALS
WEIGHT: 180 LBS | SYSTOLIC BLOOD PRESSURE: 128 MMHG | DIASTOLIC BLOOD PRESSURE: 82 MMHG | TEMPERATURE: 97.5 F | BODY MASS INDEX: 29.07 KG/M2

## 2024-08-13 DIAGNOSIS — B35.1 TINEA UNGUIUM: ICD-10-CM

## 2024-08-13 DIAGNOSIS — S92.901A CLOSED FRACTURE OF RIGHT FOOT, INITIAL ENCOUNTER: Primary | ICD-10-CM

## 2024-08-13 PROCEDURE — 99213 OFFICE O/P EST LOW 20 MIN: CPT | Performed by: PODIATRIST

## 2024-08-13 RX ORDER — CICLOPIROX 80 MG/ML
SOLUTION TOPICAL
Qty: 6 ML | Refills: 2 | Status: SHIPPED | OUTPATIENT
Start: 2024-08-13

## 2024-08-13 NOTE — PROGRESS NOTES
intact;     Neurovascular  Dorsalis pedis: 3+  Posterior tibial: 3+  Saphenous nerve sensation: normal  Tibial nerve sensation: normal  Superficial peroneal nerve sensation: normal  Deep peroneal nerve sensation: normal  Sural nerve sensation: normal             Ortho Exam    Vascular: pulses  dp  pt    Capillary Refill Time:   Hair growth  Skin:    Edema:    Neurologic:      Musculoskeletal/ Orthopedic examination:    NAIL fifth digit right foot has thick pitting mycotic yellowish incurvated nail  XR FOOT RIGHT (MIN 3 VIEWS)    Result Date: 8/3/2024  EXAMINATION: THREE XRAY VIEWS OF THE RIGHT FOOT 8/3/2024 6:03 pm COMPARISON: None. HISTORY: ORDERING SYSTEM PROVIDED HISTORY: injury TECHNOLOGIST PROVIDED HISTORY: Reason for exam:->injury FINDINGS: There is a complete transverse fracture through the base of the proximal phalanx of the 5th toe.  There is overlying soft tissue swelling.  No additional fractures or foreign body are identified.  The visualized mid and hindfoot appears preserved.     There is a complete transverse fracture through the base of the proximal phalanx of the 5th toe.            Assessment and Plan: Today I reviewed the x-rays patient will continue with the postop shoe for 2 weeks then transition to a tennis shoe Pen[place] for fungus any Cook was seen today for foot injury.    Diagnoses and all orders for this visit:    Closed fracture of right foot, initial encounter  -     XR FOOT RIGHT (MIN 3 VIEWS); Future    Tinea unguium    Other orders  -     ciclopirox (PENLAC) 8 % solution; Apply topically nightly.        No follow-ups on file.      Seen By:  Miky Fiore DPM      Document was created using voice recognition software.  Note was reviewed, however may contain grammatical errors.

## 2024-09-06 ENCOUNTER — OFFICE VISIT (OUTPATIENT)
Dept: PODIATRY | Age: 54
End: 2024-09-06
Payer: COMMERCIAL

## 2024-09-06 VITALS
SYSTOLIC BLOOD PRESSURE: 128 MMHG | BODY MASS INDEX: 29.07 KG/M2 | TEMPERATURE: 98.2 F | WEIGHT: 180 LBS | DIASTOLIC BLOOD PRESSURE: 82 MMHG

## 2024-09-06 DIAGNOSIS — B35.1 TINEA UNGUIUM: ICD-10-CM

## 2024-09-06 DIAGNOSIS — S92.901D CLOSED FRACTURE OF RIGHT FOOT WITH ROUTINE HEALING, SUBSEQUENT ENCOUNTER: Primary | ICD-10-CM

## 2024-09-06 DIAGNOSIS — S92.901A CLOSED FRACTURE OF RIGHT FOOT, INITIAL ENCOUNTER: ICD-10-CM

## 2024-09-06 PROCEDURE — 99213 OFFICE O/P EST LOW 20 MIN: CPT | Performed by: PODIATRIST

## 2024-09-06 RX ORDER — DICLOFENAC POTASSIUM 50 MG/1
50 TABLET, FILM COATED ORAL DAILY PRN
Qty: 90 TABLET | Refills: 0 | Status: SHIPPED | OUTPATIENT
Start: 2024-09-06 | End: 2024-12-05

## 2024-09-06 NOTE — PROGRESS NOTES
24  Joyce Cummins : 1970 Sex: female  Age: 53 y.o.    Patient was referred by Hal Parker MD    Chief Complaint   Patient presents with    Foot Injury     Right foot right ankle  F/u fx pt only wanted her foot xray'd her ankle is better   In ankle brace reg shoe  Sent for new xrays     Nail Problem     [place] ck        SUBJECTIVE patient is seen today for follow-up of a right ankle fracture which is doing excellent fifth digit right foot fracture which is feeling much better and nail care  HPI  Review of Systems  Const: Denies constitutional symptoms  Musculo: Denies symptoms other than stated above  Skin: Denies symptoms other than stated above       Current Outpatient Medications:     diclofenac (CATAFLAM) 50 MG tablet, Take 1 tablet by mouth daily as needed for Pain, Disp: 90 tablet, Rfl: 0    ondansetron (ZOFRAN-ODT) 4 MG disintegrating tablet, Take 1 tablet by mouth 3 times daily as needed for Nausea or Vomiting, Disp: 21 tablet, Rfl: 0    magnesium gluconate (MAGONATE) 500 MG tablet, Take 1 tablet by mouth daily, Disp: , Rfl:     zinc gluconate 50 MG tablet, Take 1 tablet by mouth daily, Disp: , Rfl:     venlafaxine (EFFEXOR XR) 37.5 MG extended release capsule, Take 1 capsule by mouth daily, Disp: , Rfl:     SUMAtriptan (IMITREX) 100 MG tablet, Take 1 tablet by mouth, Disp: , Rfl:     ondansetron (ZOFRAN-ODT) 4 MG disintegrating tablet, Place 1 tablet under the tongue every 8 hours as needed for Nausea or Vomiting, Disp: 9 tablet, Rfl: 0    blood glucose monitor kit and supplies, Dispense sufficient amount for indicated testing frequency of BID  plus additional to accommodate PRN testing needs. Dispense all needed supplies to include: monitor, strips, lancing device, lancets, control solutions, alcohol swabs., Disp: 1 kit, Rfl: 12  Allergies   Allergen Reactions    Morphine      Reaction is reddened skin and patient \"blacks out\"    Oxycodone Anxiety    Tomato Anaphylaxis    Alprazolam

## 2024-09-16 ENCOUNTER — OFFICE VISIT (OUTPATIENT)
Dept: FAMILY MEDICINE CLINIC | Age: 54
End: 2024-09-16
Payer: COMMERCIAL

## 2024-09-16 VITALS
WEIGHT: 183 LBS | SYSTOLIC BLOOD PRESSURE: 126 MMHG | HEART RATE: 83 BPM | OXYGEN SATURATION: 99 % | BODY MASS INDEX: 29.55 KG/M2 | DIASTOLIC BLOOD PRESSURE: 80 MMHG | TEMPERATURE: 98.5 F

## 2024-09-16 DIAGNOSIS — J01.90 ACUTE NON-RECURRENT SINUSITIS, UNSPECIFIED LOCATION: Primary | ICD-10-CM

## 2024-09-16 DIAGNOSIS — R09.81 NASAL CONGESTION: ICD-10-CM

## 2024-09-16 DIAGNOSIS — R09.82 POSTNASAL DRIP: ICD-10-CM

## 2024-09-16 PROCEDURE — 99213 OFFICE O/P EST LOW 20 MIN: CPT | Performed by: PHYSICIAN ASSISTANT

## 2024-09-16 RX ORDER — CEFDINIR 300 MG/1
300 CAPSULE ORAL 2 TIMES DAILY
Qty: 20 CAPSULE | Refills: 0 | Status: SHIPPED | OUTPATIENT
Start: 2024-09-16 | End: 2024-09-26

## 2024-10-07 ENCOUNTER — TELEPHONE (OUTPATIENT)
Dept: PRIMARY CARE CLINIC | Age: 54
End: 2024-10-07

## 2024-10-07 NOTE — TELEPHONE ENCOUNTER
Yes, unfortunately that is a medicine that we are not familiar with.  The pharmacy would likely be able to show her

## 2024-10-07 NOTE — TELEPHONE ENCOUNTER
Patient was prescribed an Emgality Pen that she needs to give herself monthly for migraines. Medication was ordered by neurology through [facility] and she does not want to drive to [place] for this. She is calling and asking if someone would be able to show her how to give herself the medication. Directions states that patient needs to be shown prior to administering it herself. I did explain that we are not familiar with this medication and we don't give that in the office, but patient wanted me to ask anyways.

## 2024-10-31 ENCOUNTER — HOSPITAL ENCOUNTER (EMERGENCY)
Age: 54
Discharge: HOME | End: 2024-10-31
Payer: COMMERCIAL

## 2024-10-31 VITALS
SYSTOLIC BLOOD PRESSURE: 121 MMHG | OXYGEN SATURATION: 97 % | HEART RATE: 76 BPM | HEIGHT: 65 IN | BODY MASS INDEX: 30.32 KG/M2 | DIASTOLIC BLOOD PRESSURE: 65 MMHG | RESPIRATION RATE: 16 BRPM | TEMPERATURE: 98.6 F | WEIGHT: 182 LBS

## 2024-10-31 DIAGNOSIS — T78.40XA ALLERGIC REACTION, INITIAL ENCOUNTER: Primary | ICD-10-CM

## 2024-10-31 DIAGNOSIS — L23.9 ALLERGIC CONTACT DERMATITIS, UNSPECIFIED TRIGGER: ICD-10-CM

## 2024-10-31 DIAGNOSIS — L50.9 URTICARIA: ICD-10-CM

## 2024-10-31 PROCEDURE — 2500000003 HC RX 250 WO HCPCS

## 2024-10-31 PROCEDURE — 99284 EMERGENCY DEPT VISIT MOD MDM: CPT

## 2024-10-31 PROCEDURE — 2580000003 HC RX 258

## 2024-10-31 PROCEDURE — 96375 TX/PRO/DX INJ NEW DRUG ADDON: CPT

## 2024-10-31 PROCEDURE — 96374 THER/PROPH/DIAG INJ IV PUSH: CPT

## 2024-10-31 PROCEDURE — 6360000002 HC RX W HCPCS

## 2024-10-31 RX ORDER — FAMOTIDINE 20 MG/1
20 TABLET, FILM COATED ORAL 2 TIMES DAILY
Qty: 20 TABLET | Refills: 0 | Status: SHIPPED | OUTPATIENT
Start: 2024-10-31 | End: 2024-11-10

## 2024-10-31 RX ORDER — PREDNISONE 20 MG/1
TABLET ORAL
Qty: 18 TABLET | Refills: 0 | Status: SHIPPED | OUTPATIENT
Start: 2024-10-31 | End: 2024-11-10

## 2024-10-31 RX ORDER — DIPHENHYDRAMINE HYDROCHLORIDE 50 MG/ML
25 INJECTION INTRAMUSCULAR; INTRAVENOUS ONCE
Status: COMPLETED | OUTPATIENT
Start: 2024-10-31 | End: 2024-10-31

## 2024-10-31 RX ORDER — METHYLPREDNISOLONE SODIUM SUCCINATE 125 MG/2ML
125 INJECTION, POWDER, LYOPHILIZED, FOR SOLUTION INTRAMUSCULAR; INTRAVENOUS ONCE
Status: COMPLETED | OUTPATIENT
Start: 2024-10-31 | End: 2024-10-31

## 2024-10-31 RX ADMIN — DIPHENHYDRAMINE HYDROCHLORIDE 25 MG: 50 INJECTION INTRAMUSCULAR; INTRAVENOUS at 20:12

## 2024-10-31 RX ADMIN — METHYLPREDNISOLONE SODIUM SUCCINATE 125 MG: 125 INJECTION INTRAMUSCULAR; INTRAVENOUS at 20:12

## 2024-10-31 RX ADMIN — FAMOTIDINE 20 MG: 10 INJECTION, SOLUTION INTRAVENOUS at 20:12

## 2024-10-31 ASSESSMENT — PAIN - FUNCTIONAL ASSESSMENT: PAIN_FUNCTIONAL_ASSESSMENT: NONE - DENIES PAIN

## 2024-10-31 NOTE — ED PROVIDER NOTES
Independent DEJUAN Visit     [facility]  Department of Emergency Medicine   ED  Encounter Note  Admit Date/RoomTime: 10/31/2024  7:34 PM  ED Room: DISPO/D01    NAME: Joyce uCmmins  : 1970  MRN: [id number]     Chief Complaint:  Rash (Pt states started with red itchy spots om arms some relief with allergy medicine denies SOB or trouble swallowing )    History of Present Illness   History provided by the patient and chart review.    Joyce Cummins is a 54 y.o. old female who presents to the emergency department by private vehicle, for gradual onset of an itchy erythematous rash that started on her bilateral wrist yesterday.  Her friend to give her one of her allergy pills yesterday which seemed to help.  She has taken Benadryl several times today with some intermittent improvement, but as medication wears off symptoms returned.  She is now having a rash of her face, some swelling under her right eye, neck, upper anterior chest, and the rash is extending into her forearm area bilaterally. Since onset the symptoms have been gradually worsening and waxing and waning and moderate in severity. Her symptoms are associated with hives and facial swelling and somewhat intermittent relieved by benadryl and OTC allergy medication.  She denies any difficulty breathing, difficulty swallowing, wheezing, throat tightness, hoarseness, stridor, lightheadedness, dizziness, lip swelling, tongue swelling, fever, chills, chest pain, or abd pain.  The rash area on her chin did start oozing serous fluid today.  She did start Emgality at the beginning of October.  She denies any changes in hygiene products or known allergen exposure.    ROS   Pertinent positives and negatives are stated within HPI, all other systems reviewed and are negative.    Past Medical History:  has a past medical history of Blood in urine, Constipation, Endometriosis, H/O superficial phlebitis, Migraines, Raynaud's disease, Squamous cell

## 2024-11-01 ENCOUNTER — HOSPITAL ENCOUNTER (EMERGENCY)
Age: 54
Discharge: HOME | End: 2024-11-01
Payer: COMMERCIAL

## 2024-11-01 ENCOUNTER — OFFICE VISIT (OUTPATIENT)
Dept: PRIMARY CARE CLINIC | Age: 54
End: 2024-11-01
Payer: COMMERCIAL

## 2024-11-01 VITALS
WEIGHT: 183 LBS | OXYGEN SATURATION: 99 % | SYSTOLIC BLOOD PRESSURE: 122 MMHG | HEART RATE: 89 BPM | DIASTOLIC BLOOD PRESSURE: 60 MMHG | TEMPERATURE: 97.6 F | BODY MASS INDEX: 30.45 KG/M2

## 2024-11-01 VITALS
WEIGHT: 183 LBS | OXYGEN SATURATION: 100 % | SYSTOLIC BLOOD PRESSURE: 106 MMHG | HEIGHT: 65 IN | HEART RATE: 83 BPM | TEMPERATURE: 97.5 F | DIASTOLIC BLOOD PRESSURE: 95 MMHG | RESPIRATION RATE: 18 BRPM | BODY MASS INDEX: 30.49 KG/M2

## 2024-11-01 DIAGNOSIS — R21 RASH AND OTHER NONSPECIFIC SKIN ERUPTION: ICD-10-CM

## 2024-11-01 DIAGNOSIS — T78.40XA ALLERGIC REACTION, INITIAL ENCOUNTER: ICD-10-CM

## 2024-11-01 DIAGNOSIS — L50.9 URTICARIA: Primary | ICD-10-CM

## 2024-11-01 DIAGNOSIS — T78.40XA ALLERGIC REACTION, INITIAL ENCOUNTER: Primary | ICD-10-CM

## 2024-11-01 PROCEDURE — 6370000000 HC RX 637 (ALT 250 FOR IP): Performed by: PHYSICIAN ASSISTANT

## 2024-11-01 PROCEDURE — 6360000002 HC RX W HCPCS: Performed by: PHYSICIAN ASSISTANT

## 2024-11-01 PROCEDURE — 99284 EMERGENCY DEPT VISIT MOD MDM: CPT

## 2024-11-01 PROCEDURE — 99214 OFFICE O/P EST MOD 30 MIN: CPT | Performed by: FAMILY MEDICINE

## 2024-11-01 PROCEDURE — 96372 THER/PROPH/DIAG INJ SC/IM: CPT

## 2024-11-01 RX ORDER — HYDROXYZINE PAMOATE 25 MG/1
25 CAPSULE ORAL ONCE
Status: COMPLETED | OUTPATIENT
Start: 2024-11-01 | End: 2024-11-01

## 2024-11-01 RX ORDER — TRIAMCINOLONE ACETONIDE 40 MG/ML
40 INJECTION, SUSPENSION INTRA-ARTICULAR; INTRAMUSCULAR ONCE
Status: COMPLETED | OUTPATIENT
Start: 2024-11-01 | End: 2024-11-01

## 2024-11-01 RX ORDER — IMIPRAMINE HYDROCHLORIDE 10 MG/1
10 TABLET, FILM COATED ORAL NIGHTLY
COMMUNITY

## 2024-11-01 RX ORDER — HYDROXYZINE PAMOATE 25 MG/1
25 CAPSULE ORAL 2 TIMES DAILY PRN
Qty: 30 CAPSULE | Refills: 0 | Status: SHIPPED | OUTPATIENT
Start: 2024-11-01

## 2024-11-01 RX ORDER — FLUTICASONE PROPIONATE 50 MCG
2 SPRAY, SUSPENSION (ML) NASAL DAILY
COMMUNITY

## 2024-11-01 RX ORDER — GALCANEZUMAB 120 MG/ML
INJECTION, SOLUTION SUBCUTANEOUS
COMMUNITY
Start: 2024-10-03

## 2024-11-01 RX ORDER — FAMOTIDINE 20 MG/1
20 TABLET, FILM COATED ORAL ONCE
Status: COMPLETED | OUTPATIENT
Start: 2024-11-01 | End: 2024-11-01

## 2024-11-01 RX ORDER — TRIAMCINOLONE ACETONIDE 5 MG/G
CREAM TOPICAL
Qty: 60 G | Refills: 0 | Status: SHIPPED | OUTPATIENT
Start: 2024-11-01 | End: 2024-11-08

## 2024-11-01 RX ADMIN — FAMOTIDINE 20 MG: 20 TABLET ORAL at 13:52

## 2024-11-01 RX ADMIN — HYDROXYZINE PAMOATE 25 MG: 25 CAPSULE ORAL at 13:52

## 2024-11-01 RX ADMIN — TRIAMCINOLONE ACETONIDE 40 MG: 40 INJECTION, SUSPENSION INTRA-ARTICULAR; INTRAMUSCULAR at 13:52

## 2024-11-01 SDOH — ECONOMIC STABILITY: FOOD INSECURITY: WITHIN THE PAST 12 MONTHS, THE FOOD YOU BOUGHT JUST DIDN'T LAST AND YOU DIDN'T HAVE MONEY TO GET MORE.: NEVER TRUE

## 2024-11-01 SDOH — ECONOMIC STABILITY: FOOD INSECURITY: WITHIN THE PAST 12 MONTHS, YOU WORRIED THAT YOUR FOOD WOULD RUN OUT BEFORE YOU GOT MONEY TO BUY MORE.: NEVER TRUE

## 2024-11-01 SDOH — ECONOMIC STABILITY: INCOME INSECURITY: HOW HARD IS IT FOR YOU TO PAY FOR THE VERY BASICS LIKE FOOD, HOUSING, MEDICAL CARE, AND HEATING?: NOT HARD AT ALL

## 2024-11-01 NOTE — PROGRESS NOTES
Joyce Cummins : 1970 Sex: female  Age: 54 y.o.    Chief Complaint   Patient presents with    Follow-Up from Hospital     ER 10/31/24 Rash-started with red itchy spots on arms some relief with allergy medicine   No on face and neck     Per patient it burns and itches   Took a dose of benadryl at 9:30 this morning        HPI  HPI:      Presents today with severe rash face neck that is spreading.  She woke up from a nap yesterday with sudden onset went to the ER where she received IV Benadryl Pepcid Solu-Medrol and was sent home with a prednisone prescription that she did not  yet.  She insist that there is nothing new.  Has had significant reaction but never to this extent with poison sumac.  Thinks somebody might of and burning in the air.  She eats peanut butter every day but is never had a problem.  No other new foods medicines.  Did have Emgality injection early October.  Not on ACE inhibitor/ARB.  No new soaps detergents fabric softeners make-ups or otherwise.  Although states it was worse yesterday still severe today, woke up her throat felt little funny and still feels a little funny she says.  Significant swelling of her face neck and now spreading on her arms legs she states.  Took Benadryl this morning but really did not help.  No chest pain shortness breath abdominal pain nausea or vomiting.  Asking for an allergist referral    ROS:  As above      Current Outpatient Medications:     fluticasone (FLONASE) 50 MCG/ACT nasal spray, 2 sprays by Each Nostril route daily Shake liquid, Disp: , Rfl:     EMGALITY 120 MG/ML SOAJ, ADMINISTER 1 ML UNDER THE SKIN MONTHLY, Disp: , Rfl:     imipramine (TOFRANIL) 10 MG tablet, Take 1 tablet by mouth nightly, Disp: , Rfl:     predniSONE (DELTASONE) 20 MG tablet, Sig.: Take 60mg  Po qd x 3 days, then 40mg po qd x3 days, then 20mg po qd x 3 days. QS x 9 days, Disp: 18 tablet, Rfl: 0    famotidine (PEPCID) 20 MG tablet, Take 1 tablet by mouth 2 times daily for

## 2024-11-01 NOTE — DISCHARGE INSTRUCTIONS
Take all medication as prescribed even if you are feeling better.  Continue Benadryl 25 to 50 mg every 6 hours to help with itching and swelling.    If Pepcid is not covered by your insurance, you may  over-the-counter and generic version of famotidine 20 mg tablets.  Take 1 tablet twice a day for the next 10 days.    Follow-up with your PMD as instructed.    Please read the enclosed information on anaphylaxis as these are signs /symptoms you need to return to the ER for.    Return to the ER if you develop symptoms of anaphylaxis such as swelling of the mouth, lips, throat, have shortness of breath, wheezing, develop chest pain, abdominal pain, feel nauseated, feel lightheaded or dizzy, have chills, or develop with fever.

## 2024-11-01 NOTE — ED PROVIDER NOTES
Independent DEJUAN Visit.     Department of Emergency Medicine   ED  Provider Note  Admit Date/Time: 11/1/2024 12:39 PM  ED Bed: MARIO/EDWINA2     MRN: [id number]  CHIEF COMPLAINT:   Allergic Reaction (Was seen here yesterday, d/c and seen pcp today and sent back here )       HISTORY OF PRESENT ILLNESS:      Joyce Cummins is a 54 y.o. female who presents to the ED for allergic reaction.  Patient was seen yesterday for same complaint.  She states she was given IV steroids at that time and did start to feel better.  She went to follow-up with her family doctor this morning who advised she come back to the emergency department.  Patient has no difficulty with breathing, swallowing, or handling secretions.  She states her family doctor was concerned because she still has redness and swelling around both eyes and face.  Patient has no pain in her eyes.  She denies any itching.  She has no pain with eye movement.  She has no chest pain, shortness of breath, or pain with breathing.  Patient states the rash on her arms has improved.  She did take Benadryl around 9 AM this morning.  Patient is alert and oriented x 3 and in no apparent distress at this exam.  She is nontoxic-appearing.  She believes this is a reaction to poison sumac.  Patient states she is very sensitive to environmental allergies    PCP: Hal Parker MD    REVIEW OF SYSTEMS:   Pertinent positives and negatives are stated within HPI, all other systems reviewed and are negative.    Past Medical History:   Past Medical History:   Diagnosis Date    Blood in urine     history of    Constipation     Endometriosis     H/O superficial phlebitis 04/29/2021    Migraines     Raynaud's disease     Squamous cell carcinoma of head and neck 03/09/2023    Superficial phlebitis of leg, right 09/30/2020    Varicose veins of leg with pain, right 09/30/2020     Past Surgical History:   Past Surgical History:   Procedure Laterality Date    ANKLE SURGERY      APPENDECTOMY       CHOLECYSTECTOMY      CYSTOSCOPY      HERNIA REPAIR      LAPAROSCOPY      multiple for endometriosis    TONSILLECTOMY       Social History:  reports that she has never smoked. She has never used smokeless tobacco. She reports that she does not drink alcohol and does not use drugs.    Family History: family history includes Allergy (Severe) in her father; Breast Cancer (age of onset: 70 - 79) in her maternal grandmother; Cancer in her paternal cousin; Heart Disease in her father and mother; Other in her maternal aunt, maternal grandmother, and mother; Thyroid Disease in her mother.     Allergies: Morphine, Oxycodone, Tomato, Alprazolam, Hydrocodone-acetaminophen, Influenza vaccines, Oseltamivir, Oxycontin [oxycodone hcl], Tamiflu [oseltamivir phosphate], Topiramate, Adhesive tape, and Oseltamivir phosphate    PHYSICAL EXAM:     Vitals:    11/01/24 1238   BP: (!) 106/95   Pulse: 83   Resp: 18   Temp: 97.5 °F (36.4 °C)   SpO2: 100%   Weight: 83 kg (183 lb)   Height: 1.651 m (5' 5\")     Oxygen Saturation Interpretation: Normal    Physical Exam   Constitutional/General: Alert and oriented x4, well appearing, non toxic in NAD  HEENT:  NC/NT. EOMI, PERRLA, Airway patent, noninjected conjunctiva, this to the T-zone of face and to both zygomas, no significant orbital swelling,   Neck: Supple, full ROM, non tender with no meningeal signs, no lymphadenopathy   Respiratory: Lungs clear to auscultation bilaterally with good air flow, Not in respiratory distress  CV:  Regular rate. Regular rhythm.  GI:  Abdomen soft, Non tender, Non distended. No rebound, guarding, or rigidity. No pulsatile masses.  Musculoskeletal: Moves all extremities x 4. Warm and well perfused, no edema.  Integument: Skin warm and dry.  Slight redness to both wrists, no vesicles  Neurologic: GCS 15, no focal deficits, symmetric strength 5/5 in the upper and lower extremities bilaterally, CN II-XII grossly intact    PROCEDURES:   None      LAB/IMAGING RESULTS:

## 2024-11-29 NOTE — TELEPHONE ENCOUNTER
Name of Medication(s) Requested:  Requested Prescriptions     Pending Prescriptions Disp Refills    venlafaxine (EFFEXOR XR) 37.5 MG extended release capsule 30 capsule 1     Sig: Take 1 capsule by mouth daily       Medication is on current medication list Yes    Dosage and directions were verified? Yes    Quantity verified: 30 day supply     Pharmacy Verified?  Yes    Last Appointment:  11/1/2024    Future appts:  Future Appointments   Date Time Provider Department Center   1/7/2025  1:30 PM Miky Fiore, [facility]        (If no appt send self scheduling link. .REFILLAPPT)  Scheduling request sent?     [] Yes  [x] No    Does patient need updated?  [] Yes  [x] No

## 2024-12-02 RX ORDER — VENLAFAXINE HYDROCHLORIDE 37.5 MG/1
37.5 CAPSULE, EXTENDED RELEASE ORAL DAILY
Qty: 30 CAPSULE | Refills: 1 | Status: SHIPPED | OUTPATIENT
Start: 2024-12-02

## 2024-12-30 ENCOUNTER — OFFICE VISIT (OUTPATIENT)
Dept: FAMILY MEDICINE CLINIC | Age: 54
End: 2024-12-30
Payer: COMMERCIAL

## 2024-12-30 VITALS
HEIGHT: 65 IN | SYSTOLIC BLOOD PRESSURE: 128 MMHG | DIASTOLIC BLOOD PRESSURE: 80 MMHG | HEART RATE: 106 BPM | WEIGHT: 183 LBS | OXYGEN SATURATION: 98 % | TEMPERATURE: 97.6 F | BODY MASS INDEX: 30.49 KG/M2

## 2024-12-30 DIAGNOSIS — J03.90 EXUDATIVE TONSILLITIS: ICD-10-CM

## 2024-12-30 DIAGNOSIS — J06.9 URI WITH COUGH AND CONGESTION: ICD-10-CM

## 2024-12-30 DIAGNOSIS — J02.9 SORE THROAT: Primary | ICD-10-CM

## 2024-12-30 PROCEDURE — 99213 OFFICE O/P EST LOW 20 MIN: CPT

## 2024-12-30 RX ORDER — BROMPHENIRAMINE MALEATE, PSEUDOEPHEDRINE HYDROCHLORIDE, AND DEXTROMETHORPHAN HYDROBROMIDE 2; 30; 10 MG/5ML; MG/5ML; MG/5ML
10 SYRUP ORAL 4 TIMES DAILY PRN
Qty: 400 ML | Refills: 0 | Status: SHIPPED | OUTPATIENT
Start: 2024-12-30 | End: 2025-01-09

## 2024-12-30 RX ORDER — AMOXICILLIN 500 MG/1
500 CAPSULE ORAL 2 TIMES DAILY
Qty: 14 CAPSULE | Refills: 0 | Status: SHIPPED | OUTPATIENT
Start: 2024-12-30 | End: 2025-01-06

## 2024-12-30 NOTE — PROGRESS NOTES
2024     Joyce Cummins 54 y.o. female    : 1970   Chief Complaint:   Ear Pain (Bilateral, x1-2 days ), Pharyngitis, and Cough      History of Present Illness   Source of history provided by:  patient.    Joyce Cummins is a 54 y.o. old female who presents to walk-in for evaluation of ear pain x 2 days. Associated symptoms include cough and sore throat.  Since onset symptoms have been worsening.  Patient has had no known Covid 19 exposure.  Patient has not been diagnosed with COVID-19 in the last 90 days.  Has taken Vitamins at home with some symptomatic relief. Denies any fever, chills, CP, dyspnea, LE edema, abdominal pain, nausea, vomiting, rash, dizziness, or lethargy. Denies any history of asthma, pneumonia, recurrent bronchitis or COPD.  They have no history of tobacco abuse.        ROS   Past Medical History:   Past Medical History:   Diagnosis Date    Blood in urine     history of    Constipation     Endometriosis     H/O superficial phlebitis 2021    Migraines     Raynaud's disease     Squamous cell carcinoma of head and neck 2023    Superficial phlebitis of leg, right 2020    Varicose veins of leg with pain, right 2020     Past Surgical History:  has a past surgical history that includes Appendectomy; hernia repair; Ankle surgery; Cholecystectomy; Tonsillectomy; laparoscopy; and Cystoscopy.  Social History:  reports that she has never smoked. She has never used smokeless tobacco. She reports that she does not drink alcohol and does not use drugs.  Family History: family history includes Allergy (Severe) in her father; Breast Cancer (age of onset: 70 - 79) in her maternal grandmother; Cancer in her paternal cousin; Heart Disease in her father and mother; Other in her maternal aunt, maternal grandmother, and mother; Thyroid Disease in her mother.   Allergies: Morphine, Oxycodone, Tomato, Alprazolam, Hydrocodone-acetaminophen, Influenza vaccines, Oseltamivir,

## 2025-01-07 ENCOUNTER — OFFICE VISIT (OUTPATIENT)
Dept: PODIATRY | Age: 55
End: 2025-01-07

## 2025-01-07 VITALS
HEIGHT: 66 IN | DIASTOLIC BLOOD PRESSURE: 72 MMHG | TEMPERATURE: 98.2 F | HEART RATE: 87 BPM | BODY MASS INDEX: 29.41 KG/M2 | SYSTOLIC BLOOD PRESSURE: 114 MMHG | WEIGHT: 183 LBS

## 2025-01-07 DIAGNOSIS — M25.471 PAIN AND SWELLING OF RIGHT ANKLE: Primary | ICD-10-CM

## 2025-01-07 DIAGNOSIS — M79.675 PAIN IN LEFT TOE(S): ICD-10-CM

## 2025-01-07 DIAGNOSIS — M25.571 PAIN AND SWELLING OF RIGHT ANKLE: Primary | ICD-10-CM

## 2025-01-07 DIAGNOSIS — L84 CORN OR CALLUS: ICD-10-CM

## 2025-01-07 RX ORDER — METHYLPREDNISOLONE ACETATE 40 MG/ML
40 INJECTION, SUSPENSION INTRA-ARTICULAR; INTRALESIONAL; INTRAMUSCULAR; SOFT TISSUE ONCE
Status: COMPLETED | OUTPATIENT
Start: 2025-01-07 | End: 2025-01-07

## 2025-01-07 RX ORDER — BUPIVACAINE HYDROCHLORIDE 5 MG/ML
1 INJECTION, SOLUTION PERINEURAL ONCE
Status: COMPLETED | OUTPATIENT
Start: 2025-01-07 | End: 2025-01-07

## 2025-01-07 RX ORDER — DICLOFENAC POTASSIUM 50 MG/1
50 TABLET, FILM COATED ORAL DAILY PRN
Qty: 90 TABLET | Refills: 0 | Status: SHIPPED | OUTPATIENT
Start: 2025-01-07 | End: 2025-04-07

## 2025-01-07 RX ADMIN — BUPIVACAINE HYDROCHLORIDE 1 MG: 5 INJECTION, SOLUTION PERINEURAL at 13:56

## 2025-01-07 RX ADMIN — METHYLPREDNISOLONE ACETATE 1 MG: 40 INJECTION, SUSPENSION INTRA-ARTICULAR; INTRALESIONAL; INTRAMUSCULAR; SOFT TISSUE at 13:57

## 2025-01-07 NOTE — PROGRESS NOTES
25  Joyce Cummins : 1970 Sex: female  Age: 54 y.o.    Patient was referred by Hal Parker MD    Chief Complaint   Patient presents with    Foot Injury     F/u right foot right ankle fracture. Continues to have pain to the foot. Also has left lateral foot callous/plantar wart. PCP Hal Parker, seen 24.         SUBJECTIVE patient is seen today for chronic right ankle pain as well as a callus on the left foot this is a new issue.  Foot Injury       Review of Systems  Const: Denies constitutional symptoms  Musculo: Denies symptoms other than stated above  Skin: Denies symptoms other than stated above       Current Outpatient Medications:     diclofenac (CATAFLAM) 50 MG tablet, Take 1 tablet by mouth daily as needed for Pain, Disp: 90 tablet, Rfl: 0    EMGALITY 120 MG/ML SOAJ, ADMINISTER 1 ML UNDER THE SKIN MONTHLY, Disp: , Rfl:     hydrOXYzine pamoate (VISTARIL) 25 MG capsule, Take 1 capsule by mouth 2 times daily as needed for Itching, Disp: 30 capsule, Rfl: 0    ondansetron (ZOFRAN-ODT) 4 MG disintegrating tablet, Take 1 tablet by mouth 3 times daily as needed for Nausea or Vomiting, Disp: 21 tablet, Rfl: 0    magnesium gluconate (MAGONATE) 500 MG tablet, Take 1 tablet by mouth daily, Disp: , Rfl:     zinc gluconate 50 MG tablet, Take 1 tablet by mouth daily, Disp: , Rfl:     brompheniramine-pseudoephedrine-DM 2-30-10 MG/5ML syrup, Take 10 mLs by mouth 4 times daily as needed for Congestion (Patient not taking: Reported on 2025), Disp: 400 mL, Rfl: 0    venlafaxine (EFFEXOR XR) 37.5 MG extended release capsule, Take 1 capsule by mouth daily (Patient not taking: Reported on 2025), Disp: 30 capsule, Rfl: 1    fluticasone (FLONASE) 50 MCG/ACT nasal spray, 2 sprays by Each Nostril route daily Shake liquid (Patient not taking: Reported on 2025), Disp: , Rfl:     imipramine (TOFRANIL) 10 MG tablet, Take 1 tablet by mouth nightly (Patient not taking: Reported on 2025), Disp: ,

## 2025-01-16 ENCOUNTER — APPOINTMENT (OUTPATIENT)
Dept: GENERAL RADIOLOGY | Age: 55
End: 2025-01-16
Payer: COMMERCIAL

## 2025-01-16 ENCOUNTER — HOSPITAL ENCOUNTER (EMERGENCY)
Age: 55
Discharge: HOME | End: 2025-01-16
Attending: STUDENT IN AN ORGANIZED HEALTH CARE EDUCATION/TRAINING PROGRAM
Payer: COMMERCIAL

## 2025-01-16 VITALS
HEART RATE: 75 BPM | TEMPERATURE: 98.2 F | RESPIRATION RATE: 16 BRPM | WEIGHT: 182 LBS | DIASTOLIC BLOOD PRESSURE: 68 MMHG | HEIGHT: 66 IN | SYSTOLIC BLOOD PRESSURE: 144 MMHG | BODY MASS INDEX: 29.25 KG/M2 | OXYGEN SATURATION: 98 %
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
## 2025-01-16 DIAGNOSIS — S52.502A FRACTURE OF DISTAL END OF LEFT RADIUS AND ULNA, CLOSED, INITIAL ENCOUNTER: Primary | ICD-10-CM

## 2025-01-16 DIAGNOSIS — S52.602A FRACTURE OF DISTAL END OF LEFT RADIUS AND ULNA, CLOSED, INITIAL ENCOUNTER: Primary | ICD-10-CM

## 2025-01-16 PROCEDURE — 6360000002 HC RX W HCPCS: Performed by: PHYSICIAN ASSISTANT

## 2025-01-16 PROCEDURE — 6370000000 HC RX 637 (ALT 250 FOR IP): Performed by: PHYSICIAN ASSISTANT

## 2025-01-16 PROCEDURE — 99284 EMERGENCY DEPT VISIT MOD MDM: CPT

## 2025-01-16 PROCEDURE — 73070 X-RAY EXAM OF ELBOW: CPT

## 2025-01-16 PROCEDURE — 73030 X-RAY EXAM OF SHOULDER: CPT

## 2025-01-16 PROCEDURE — 73130 X-RAY EXAM OF HAND: CPT

## 2025-01-16 PROCEDURE — 73110 X-RAY EXAM OF WRIST: CPT

## 2025-01-16 PROCEDURE — 96372 THER/PROPH/DIAG INJ SC/IM: CPT

## 2025-01-16 PROCEDURE — 25605 CLTX DST RDL FX/EPHYS SEP W/: CPT

## 2025-01-16 PROCEDURE — 6370000000 HC RX 637 (ALT 250 FOR IP): Performed by: STUDENT IN AN ORGANIZED HEALTH CARE EDUCATION/TRAINING PROGRAM

## 2025-01-16 RX ORDER — TRAMADOL HYDROCHLORIDE 50 MG/1
50 TABLET ORAL EVERY 6 HOURS PRN
Qty: 12 TABLET | Refills: 0 | Status: SHIPPED | OUTPATIENT
Start: 2025-01-16 | End: 2025-01-19

## 2025-01-16 RX ORDER — ONDANSETRON 4 MG/1
4 TABLET, ORALLY DISINTEGRATING ORAL ONCE
Status: COMPLETED | OUTPATIENT
Start: 2025-01-16 | End: 2025-01-16

## 2025-01-16 RX ORDER — TRAMADOL HYDROCHLORIDE 50 MG/1
50 TABLET ORAL ONCE
Status: COMPLETED | OUTPATIENT
Start: 2025-01-16 | End: 2025-01-16

## 2025-01-16 RX ORDER — KETOROLAC TROMETHAMINE 30 MG/ML
30 INJECTION, SOLUTION INTRAMUSCULAR; INTRAVENOUS ONCE
Status: COMPLETED | OUTPATIENT
Start: 2025-01-16 | End: 2025-01-16

## 2025-01-16 RX ADMIN — ONDANSETRON 4 MG: 4 TABLET, ORALLY DISINTEGRATING ORAL at 19:57

## 2025-01-16 RX ADMIN — TRAMADOL HYDROCHLORIDE 50 MG: 50 TABLET, COATED ORAL at 20:56

## 2025-01-16 RX ADMIN — KETOROLAC TROMETHAMINE 30 MG: 30 INJECTION, SOLUTION INTRAMUSCULAR at 19:17

## 2025-01-16 ASSESSMENT — PAIN SCALES - GENERAL
PAINLEVEL_OUTOF10: 10

## 2025-01-16 ASSESSMENT — PAIN DESCRIPTION - LOCATION
LOCATION: WRIST
LOCATION: WRIST

## 2025-01-16 ASSESSMENT — PAIN DESCRIPTION - ORIENTATION
ORIENTATION: LEFT
ORIENTATION: LEFT

## 2025-01-16 ASSESSMENT — PAIN - FUNCTIONAL ASSESSMENT: PAIN_FUNCTIONAL_ASSESSMENT: 0-10

## 2025-01-17 NOTE — DISCHARGE INSTRUCTIONS
XR WRIST LEFT (MIN 3 VIEWS)   Final Result   1. Impacted distal radius fracture with intra-articular component.   2. Ulnar styloid process fracture.   .         XR ELBOW LEFT (2 VIEWS)   Final Result   No acute abnormality.         XR SHOULDER LEFT (MIN 2 VIEWS)   Final Result   No acute abnormality.      AC joint degenerative changes.         XR HAND LEFT (MIN 3 VIEWS)   Final Result   1. Impacted distal radius fracture with intra-articular component.   2. Ulnar styloid process fracture.         XR WRIST LEFT (MIN 3 VIEWS)    (Results Pending)

## 2025-01-17 NOTE — ED PROVIDER NOTES
x3, well appearing, mild distress. Seen holding ice pack to left forearm.  HEENT:  NC/NT. PERRLA,  Airway patent.  Neck: Supple, full ROM, non tender to palpation in the midline.    Respiratory: Lungs clear to auscultation bilaterally, no wheezes, rales, or rhonchi. Not in respiratory distress  CV:  Regular rate. Regular rhythm. No murmurs, gallops, or rubs. 2+ distal pulses  Musculoskeletal: Moves all extremities x 4. Exam of left UE with obvious fracture deformity to the left wrist.  Moderate swelling noted.  Markedly decreased range of motion to the left wrist.  Exam limited.  Patient is tender over the left wrist and proximal left humerus as well.  Skin:  Warm and dry. No rashes.   Lymphatic: no lymphadenopathy noted  Neurologic: GCS 15, no focal deficits, symmetric strength 5/5 in the upper and lower extremities bilaterally  Psychiatric: Normal Affect    DIAGNOSTIC RESULTS   (All laboratory and radiology results have been personally reviewed by myself)  Labs:  No results found for this visit on 01/16/25.  Imaging:  All Radiology results interpreted by Radiologist unless otherwise noted.  XR WRIST LEFT (MIN 3 VIEWS)   Final Result   1. Impacted distal radius fracture with intra-articular component.   2. Ulnar styloid process fracture.   .         XR ELBOW LEFT (2 VIEWS)   Final Result   No acute abnormality.         XR SHOULDER LEFT (MIN 2 VIEWS)   Final Result   No acute abnormality.      AC joint degenerative changes.         XR HAND LEFT (MIN 3 VIEWS)   Final Result   1. Impacted distal radius fracture with intra-articular component.   2. Ulnar styloid process fracture.             ED COURSE   Vitals:    Vitals:    01/16/25 1852   BP: (!) 146/64   Pulse: 77   Resp: 17   Temp: 98.2 °F (36.8 °C)   SpO2: 98%   Weight: 82.6 kg (182 lb)   Height: 1.676 m (5' 6\")       Patient was given the following medications:  Medications   HYDROmorphone (DILAUDID) injection 0.5 mg (has no administration in time range)

## 2025-01-17 NOTE — PROCEDURES
PROCEDURE NOTE  Date: 1/16/2025  Name: Joyce Cummins  YOB: 1970    Ortho Injury    Date/Time: 1/17/2025 12:53 AM    Performed by: Jong Manriquez MD  Authorized by: Cesar Sifuentes DO  Consent: Verbal consent obtained.  Risks and benefits: risks, benefits and alternatives were discussed  Consent given by: patient  Patient understanding: patient states understanding of the procedure being performed  Patient consent: the patient's understanding of the procedure matches consent given  Procedure consent: procedure consent matches procedure scheduled  Relevant documents: relevant documents present and verified  Test results: test results available and properly labeled  Site marked: the operative site was marked  Imaging studies: imaging studies available  Required items: required blood products, implants, devices, and special equipment available  Patient identity confirmed: verbally with patient, arm band, provided demographic data and hospital-assigned identification number  Time out: Immediately prior to procedure a \"time out\" was called to verify the correct patient, procedure, equipment, support staff and site/side marked as required.  Injury location: wrist  Location details: left wrist  Injury type: fracture  Fracture type: distal radius and ulnar styloid  Pre-procedure neurovascular assessment: neurovascularly intact  Pre-procedure distal perfusion: normal  Pre-procedure neurological function: normal  Pre-procedure range of motion: normal  Anesthesia: hematoma block    Anesthesia:  Local anesthesia used: yes  Manipulation performed: yes  Skin traction used: yes  Reduction successful: yes  X-ray confirmed reduction: yes  Immobilization: splint and sling  Splint type: sugar tong  Supplies used: Ortho-Glass  Post-procedure neurovascular assessment: post-procedure neurovascularly intact  Post-procedure distal perfusion: normal  Post-procedure neurological function: normal  Post-procedure range of

## 2025-01-24 ENCOUNTER — TELEPHONE (OUTPATIENT)
Dept: PRIMARY CARE CLINIC | Age: 55
End: 2025-01-24

## 2025-01-24 NOTE — TELEPHONE ENCOUNTER
The pt is calling just to let you know she is having surgery on her hand next week, she doesn't need anything from you, just wanted to make you aware

## 2025-01-29 ENCOUNTER — TELEPHONE (OUTPATIENT)
Dept: PRIMARY CARE CLINIC | Age: 55
End: 2025-01-29

## 2025-01-29 NOTE — TELEPHONE ENCOUNTER
Pt was asking for pre op clearance for wrist surgery she's scheduled for on Friday, but then said it's a worker's comp claim so I had to take cancel appt. With Elena to do pre op exam.  She said a Dr Lopez was the Dr referring her for the surgery. Advised to call [facility] occupational health and ask about pre op with workers come, who she can do it with. She says she was told she didn't need it but would feel better getting it done.

## 2025-04-16 ENCOUNTER — TELEPHONE (OUTPATIENT)
Dept: PRIMARY CARE CLINIC | Age: 55
End: 2025-04-16

## 2025-04-16 NOTE — TELEPHONE ENCOUNTER
Broken wrist, injured shoulder update. She thinks theres something still wrong with her hand,had surgery on it and  still can't make a fist, has seen therapy for this. She also has a torn rotator cuff, tendonitis, bursitis in shoulder, has appt on the 23rd for a second opinion.  She was told that [facility] \"doesn't do shoulders\", by [facility].  She wanted to let you know all this and had to hang up due to another call. Im not sure if she wanted you to do anything or not but she did just say to update you.

## 2025-06-18 ENCOUNTER — OFFICE VISIT (OUTPATIENT)
Dept: ORTHOPEDIC SURGERY | Age: 55
End: 2025-06-18
Payer: COMMERCIAL

## 2025-06-18 VITALS
RESPIRATION RATE: 20 BRPM | OXYGEN SATURATION: 94 % | BODY MASS INDEX: 29.25 KG/M2 | SYSTOLIC BLOOD PRESSURE: 110 MMHG | TEMPERATURE: 98 F | HEIGHT: 66 IN | HEART RATE: 78 BPM | WEIGHT: 182 LBS | DIASTOLIC BLOOD PRESSURE: 72 MMHG

## 2025-06-18 DIAGNOSIS — S43.432A LABRAL TEAR OF SHOULDER, LEFT, INITIAL ENCOUNTER: Primary | ICD-10-CM

## 2025-06-18 PROCEDURE — 99204 OFFICE O/P NEW MOD 45 MIN: CPT | Performed by: ORTHOPAEDIC SURGERY

## 2025-06-18 RX ORDER — LIDOCAINE 50 MG/G
1 PATCH TOPICAL DAILY
COMMUNITY

## 2025-06-18 NOTE — PROGRESS NOTES
[facility]  ORTHOPAEDICS   DATE OF VISIT: 06/18/25  New  Patient     Referring Provider:   No referring provider defined for this encounter.    CHIEF COMPLAINT:   Chief Complaint   Patient presents with    Shoulder Injury     Pt is here today for a left shoulder injury that occurred on January 16 when she slipped on a wet floor and landed on her left side. She states she injured her left wrist , which Dr. Burton operated on in January. She now would like to get her shoulder look at.         HPI:      History of Present Illness  The patient presents for a new patient visit for her left shoulder.    She continues to experience pain in her left shoulder, which was injured on 01/16/2025 due to a fall at work where she landed on her palm. The pain is severe, making it difficult for her to lift her arm or wear a bra strap. She reports no sensation of a pop or tear at the time of injury. She has not received any injections for her shoulder. She has been informed about the possibility of a frozen shoulder and is interested in exploring physical therapy as a treatment option. She has previously undergone physical therapy for her wrist, during which she was advised to perform certain movements to prevent freezing and potential fusion.    She also reports pain in her wrist, which was fractured into six pieces and required surgical intervention. Additionally, she experienced dislocation of her fingers.    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    PAST SURGICAL HISTORY:  Surgical intervention for wrist fracture.      PAST MEDICAL HISTORY  Past Medical History:   Diagnosis Date    Blood in urine     history of    Constipation     Endometriosis     H/O superficial phlebitis 04/29/2021    Migraines     Raynaud's disease     Squamous cell carcinoma of head and neck 03/09/2023    Superficial phlebitis of leg, right 09/30/2020    Varicose veins of leg with pain, right 09/30/2020       PAST SURGICAL HISTORY  Past

## 2025-07-02 ENCOUNTER — OFFICE VISIT (OUTPATIENT)
Dept: PRIMARY CARE CLINIC | Age: 55
End: 2025-07-02
Payer: COMMERCIAL

## 2025-07-02 VITALS
DIASTOLIC BLOOD PRESSURE: 80 MMHG | BODY MASS INDEX: 30.53 KG/M2 | SYSTOLIC BLOOD PRESSURE: 128 MMHG | HEIGHT: 66 IN | WEIGHT: 190 LBS

## 2025-07-02 DIAGNOSIS — M81.0 OSTEOPOROSIS, UNSPECIFIED OSTEOPOROSIS TYPE, UNSPECIFIED PATHOLOGICAL FRACTURE PRESENCE: ICD-10-CM

## 2025-07-02 DIAGNOSIS — G43.811 OTHER MIGRAINE WITH STATUS MIGRAINOSUS, INTRACTABLE: ICD-10-CM

## 2025-07-02 DIAGNOSIS — D49.7 PITUITARY NEOPLASM: ICD-10-CM

## 2025-07-02 DIAGNOSIS — G45.3 AMAUROSIS FUGAX: ICD-10-CM

## 2025-07-02 DIAGNOSIS — G45.9 TIA (TRANSIENT ISCHEMIC ATTACK): ICD-10-CM

## 2025-07-02 DIAGNOSIS — H53.132 SUDDEN VISUAL LOSS OF LEFT EYE: Primary | ICD-10-CM

## 2025-07-02 PROBLEM — J02.9 PHARYNGITIS: Status: RESOLVED | Noted: 2022-07-08 | Resolved: 2025-07-02

## 2025-07-02 PROBLEM — E66.3 OVERWEIGHT (BMI 25.0-29.9): Status: RESOLVED | Noted: 2019-08-15 | Resolved: 2025-07-02

## 2025-07-02 PROBLEM — Z20.822 EXPOSURE TO COVID-19 VIRUS: Status: RESOLVED | Noted: 2021-04-30 | Resolved: 2025-07-02

## 2025-07-02 PROBLEM — J01.90 ACUTE BACTERIAL SINUSITIS: Status: RESOLVED | Noted: 2021-10-08 | Resolved: 2025-07-02

## 2025-07-02 PROBLEM — B96.89 ACUTE BACTERIAL SINUSITIS: Status: RESOLVED | Noted: 2021-10-08 | Resolved: 2025-07-02

## 2025-07-02 PROBLEM — R11.2 NAUSEA AND VOMITING: Status: RESOLVED | Noted: 2022-07-08 | Resolved: 2025-07-02

## 2025-07-02 PROCEDURE — 99215 OFFICE O/P EST HI 40 MIN: CPT | Performed by: FAMILY MEDICINE

## 2025-07-02 SDOH — ECONOMIC STABILITY: FOOD INSECURITY: WITHIN THE PAST 12 MONTHS, THE FOOD YOU BOUGHT JUST DIDN'T LAST AND YOU DIDN'T HAVE MONEY TO GET MORE.: NEVER TRUE

## 2025-07-02 SDOH — ECONOMIC STABILITY: FOOD INSECURITY: WITHIN THE PAST 12 MONTHS, YOU WORRIED THAT YOUR FOOD WOULD RUN OUT BEFORE YOU GOT MONEY TO BUY MORE.: NEVER TRUE

## 2025-07-02 ASSESSMENT — PATIENT HEALTH QUESTIONNAIRE - PHQ9
SUM OF ALL RESPONSES TO PHQ QUESTIONS 1-9: 0
1. LITTLE INTEREST OR PLEASURE IN DOING THINGS: NOT AT ALL
2. FEELING DOWN, DEPRESSED OR HOPELESS: NOT AT ALL
SUM OF ALL RESPONSES TO PHQ QUESTIONS 1-9: 0

## 2025-07-02 NOTE — PROGRESS NOTES
Joyce Cummins : 1970 Sex: female  Age: 54 y.o.    Chief Complaint   Patient presents with    Loss of Vision     Left eye  Happened last week per patient episode lasted 2 minutes        HPI  HPI:      2 wks ago, suddenly lost vision in the left eye for 2 minutes.  Came back somewhat gradual with a gray area on the bottom field of her vision and then returned.  No associated symptoms.  Been going through difficult time where about 6 months ago she had a slip and fall at work, Worker's Comp., pins and plate left wrist with wrist hand fracture rotator cuff tear.  Following with Ortho.  Dealing with this.  Migraines been stable.  No headaches diplopia other numbness tingling focal weakness chest pain palpitation shortness of breath abdominal symptoms or otherwise.  She does not take any anticoagulation.  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 Gabriel.  She denies contraindication to aspirin and going to start baby ASA daily.  Does not wish ER and the symptoms recur, importance of doing so to be thorough, to expedite process and certainly symptoms recur or other serious signs/symptoms.  Willing to proceed as below.  ASX now except for the issues regarding left hand wrist and shoulder.        ROS: Negative other than as above      Last lab's     Most Recent Labs  CBC  Lab Results   Component Value Date/Time    WBC 6.7 2023 11:15 AM    WBC 12.8 2022 08:05 PM    WBC 6.0 2022 12:56 PM    RBC 4.72 2023 11:15 AM    RBC 5.08 2022 08:05 PM    RBC 4.76 2022 12:56 PM    HGB 13.8 2023 11:15 AM    HGB 14.9 2022 08:05 PM    HGB 13.6 2022 12:56 PM    HCT 43.0 2023 11:15 AM    HCT 46.0 2022 08:05 PM    HCT 43.9 2022 12:56 PM    MCV 91.1 2023 11:15 AM    MCV 90.6 2022 08:05 PM    MCV 92.2 2022 12:56 PM     2023 11:15 AM     2022 08:05 PM     2022

## 2025-07-03 ENCOUNTER — TELEPHONE (OUTPATIENT)
Dept: NON INVASIVE DIAGNOSTICS | Age: 55
End: 2025-07-03

## 2025-07-03 NOTE — TELEPHONE ENCOUNTER
7/3/25 [facility] FOR PATIENT TO CALL AND VERIFY HER INFORMATION IN ORDER TO REGISTER AND MAIL TO HER HOME  THE 14 DAY ZIO XT MONITOR ORDERED BY DR KENN BALDERAS.      JOHNSON HUERTA

## 2025-07-03 NOTE — TELEPHONE ENCOUNTER
SPOKE WITH PATIENT.  PATIENT VERIFIED HER INFORMATION, VERBALIZED UNDERSTANDING OF 14 DAY ZIO XT MONITOR HOME DELIVERY, PLACEMENT, ACTIVATION, RETURN INSTRUCTIONS. MONITOR ORDERED BY DR KENN RAMIREZ    REGISTERED IN [place]

## 2025-07-10 DIAGNOSIS — M81.0 OSTEOPOROSIS, UNSPECIFIED OSTEOPOROSIS TYPE, UNSPECIFIED PATHOLOGICAL FRACTURE PRESENCE: ICD-10-CM

## 2025-07-10 DIAGNOSIS — G43.811 OTHER MIGRAINE WITH STATUS MIGRAINOSUS, INTRACTABLE: ICD-10-CM

## 2025-07-10 DIAGNOSIS — H53.132 SUDDEN VISUAL LOSS OF LEFT EYE: ICD-10-CM

## 2025-07-10 DIAGNOSIS — G45.9 TIA (TRANSIENT ISCHEMIC ATTACK): ICD-10-CM

## 2025-07-10 DIAGNOSIS — D49.7 PITUITARY NEOPLASM: ICD-10-CM

## 2025-07-10 LAB
ALBUMIN: 3.8 G/DL (ref 3.5–5.2)
ALP BLD-CCNC: 101 U/L (ref 35–104)
ALT SERPL-CCNC: 11 U/L (ref 0–35)
ANION GAP SERPL CALCULATED.3IONS-SCNC: 11 MMOL/L (ref 7–16)
AST SERPL-CCNC: 18 U/L (ref 0–35)
BASOPHILS ABSOLUTE: 0.05 K/UL (ref 0–0.2)
BASOPHILS RELATIVE PERCENT: 1 % (ref 0–2)
BILIRUB SERPL-MCNC: 0.6 MG/DL (ref 0–1.2)
BUN BLDV-MCNC: 12 MG/DL (ref 6–20)
CALCIUM SERPL-MCNC: 8.9 MG/DL (ref 8.6–10)
CHLORIDE BLD-SCNC: 106 MMOL/L (ref 98–107)
CHOLESTEROL, TOTAL: 180 MG/DL
CO2: 23 MMOL/L (ref 22–29)
CREAT SERPL-MCNC: 0.6 MG/DL (ref 0.5–1)
EOSINOPHILS ABSOLUTE: 0.16 K/UL (ref 0.05–0.5)
EOSINOPHILS RELATIVE PERCENT: 3 % (ref 0–6)
GFR, ESTIMATED: >90 ML/MIN/1.73M2
GLUCOSE BLD-MCNC: 101 MG/DL (ref 74–99)
HCT VFR BLD CALC: 41 % (ref 34–48)
HDLC SERPL-MCNC: 56 MG/DL
HEMOGLOBIN: 13.5 G/DL (ref 11.5–15.5)
IMMATURE GRANULOCYTES %: 0 % (ref 0–5)
IMMATURE GRANULOCYTES ABSOLUTE: <0.03 K/UL (ref 0–0.58)
LDL CHOLESTEROL: 95 MG/DL
LYMPHOCYTES ABSOLUTE: 1.89 K/UL (ref 1.5–4)
LYMPHOCYTES RELATIVE PERCENT: 39 % (ref 20–42)
MCH RBC QN AUTO: 29.3 PG (ref 26–35)
MCHC RBC AUTO-ENTMCNC: 32.9 G/DL (ref 32–34.5)
MCV RBC AUTO: 88.9 FL (ref 80–99.9)
MONOCYTES ABSOLUTE: 0.31 K/UL (ref 0.1–0.95)
MONOCYTES RELATIVE PERCENT: 6 % (ref 2–12)
NEUTROPHILS ABSOLUTE: 2.41 K/UL (ref 1.8–7.3)
NEUTROPHILS RELATIVE PERCENT: 50 % (ref 43–80)
PDW BLD-RTO: 12.9 % (ref 11.5–15)
PLATELET # BLD: 225 K/UL (ref 130–450)
PMV BLD AUTO: 10.1 FL (ref 7–12)
POTASSIUM SERPL-SCNC: 4.2 MMOL/L (ref 3.5–5.1)
PROLACTIN: 9.5 NG/ML
RBC # BLD: 4.61 M/UL (ref 3.5–5.5)
SED RATE, AUTOMATED: 3 MM/HR (ref 0–20)
SODIUM BLD-SCNC: 140 MMOL/L (ref 136–145)
TOTAL PROTEIN: 6.4 G/DL (ref 6.4–8.3)
TRIGL SERPL-MCNC: 145 MG/DL
TSH SERPL DL<=0.05 MIU/L-ACNC: 1.67 UIU/ML (ref 0.27–4.2)
VITAMIN D 25-HYDROXY: 19.8 NG/ML (ref 30–100)
VLDLC SERPL CALC-MCNC: 29 MG/DL
WBC # BLD: 4.8 K/UL (ref 4.5–11.5)

## 2025-07-21 ENCOUNTER — OFFICE VISIT (OUTPATIENT)
Dept: ORTHOPEDIC SURGERY | Age: 55
End: 2025-07-21

## 2025-07-21 VITALS
HEART RATE: 72 BPM | BODY MASS INDEX: 30.86 KG/M2 | DIASTOLIC BLOOD PRESSURE: 64 MMHG | SYSTOLIC BLOOD PRESSURE: 103 MMHG | WEIGHT: 192 LBS | OXYGEN SATURATION: 97 % | TEMPERATURE: 98 F | RESPIRATION RATE: 20 BRPM | HEIGHT: 66 IN

## 2025-07-21 DIAGNOSIS — S43.432D TEAR OF LEFT GLENOID LABRUM, SUBSEQUENT ENCOUNTER: Primary | ICD-10-CM

## 2025-07-21 RX ORDER — TRIAMCINOLONE ACETONIDE 40 MG/ML
40 INJECTION, SUSPENSION INTRA-ARTICULAR; INTRAMUSCULAR ONCE
Status: COMPLETED | OUTPATIENT
Start: 2025-07-21 | End: 2025-07-21

## 2025-07-21 RX ORDER — BUPIVACAINE HYDROCHLORIDE 2.5 MG/ML
2 INJECTION, SOLUTION INFILTRATION; PERINEURAL ONCE
Status: COMPLETED | OUTPATIENT
Start: 2025-07-21 | End: 2025-07-21

## 2025-07-21 RX ADMIN — TRIAMCINOLONE ACETONIDE 40 MG: 40 INJECTION, SUSPENSION INTRA-ARTICULAR; INTRAMUSCULAR at 09:12

## 2025-07-21 RX ADMIN — BUPIVACAINE HYDROCHLORIDE 5 MG: 2.5 INJECTION, SOLUTION INFILTRATION; PERINEURAL at 09:10

## 2025-07-21 NOTE — PROGRESS NOTES
[facility]  ORTHOPAEDICS    Follow Up Visit     CHIEF COMPLAINT:   Chief Complaint   Patient presents with    Shoulder Injury     Pt is here today for a left shoulder cortisone injection that's been approved through her workers come claim.         Joyce Cummins returns today for follow up visit regarding left shoulder pain. Patient authorized through [employer] comp for CSI along with PT. She is here for CSI to left shoulder.    History of Present Illness                Physical Exam:     No data recorded    General: Alert and oriented x3, no acute distress  Cardiovascular/pulmonary: No labored breathing, peripheral perfusion intact  Musculoskeletal:    Left shoulder: Elevation to about 90 degrees, 110 with active motion. Internal rotation to hip pocket. External rotation to about 30 degrees. Mild pain with Bharti test. Belk's test negative. Pain and stiffness present.     Controlled Substances Monitoring:      Imaging: Previous imaging reviewed      Assesment/Plan:   Labral tear of left shoulder  Frozen shoulder      CSI provided today in office; see procedure note below  Activity as tolerated.    Referral to PT given  You may be sore at the injection site for several days.  OK to use over the counter Acetaminophen or Ibuprofen as needed for pain  You may apply ice to your injection site.  Steroid injections can be repeated every 3 months if needed  Goal for steroid injection is at least 8 weeks of relief  Follow up in 6 weeks for reevaluation of CSI and PT      Procedure Note:  Shoulder steroid injection     The left shoulder was identified as the injection site. The risk and benefits of a cortisone injection were explained and the patient consented to the injection. Under aseptic conditions, the subacromial space was injected from posterior approach with a mixture of 40 mg of Kenalog, 2cc 0.25% Marcaine without complication. A bandage was applied.          Rosalba Sethi, APRN-CNP  Orthopaedic Surgery

## 2025-07-24 ENCOUNTER — TELEPHONE (OUTPATIENT)
Dept: PRIMARY CARE CLINIC | Age: 55
End: 2025-07-24

## 2025-07-24 DIAGNOSIS — H53.132 SUDDEN VISUAL LOSS OF LEFT EYE: ICD-10-CM

## 2025-07-24 DIAGNOSIS — G45.9 TIA (TRANSIENT ISCHEMIC ATTACK): ICD-10-CM

## 2025-07-24 DIAGNOSIS — D49.7 PITUITARY NEOPLASM: Primary | ICD-10-CM

## 2025-07-24 DIAGNOSIS — G43.811 OTHER MIGRAINE WITH STATUS MIGRAINOSUS, INTRACTABLE: ICD-10-CM

## 2025-07-24 NOTE — TELEPHONE ENCOUNTER
MRI BRAIN WITH AND WITHOUT CONTRAST ordered and tech wants to know if you want an MRI of the Pituitary instead of the brain? It would also be with and without contrast.

## 2025-07-24 NOTE — TELEPHONE ENCOUNTER
Spoke with [facility] they said to oirder MRI Pituitary and it will capture both.   Order pended

## 2025-07-25 ENCOUNTER — HOSPITAL ENCOUNTER (OUTPATIENT)
Dept: MRI IMAGING | Age: 55
Discharge: HOME | End: 2025-07-27
Attending: FAMILY MEDICINE
Payer: COMMERCIAL

## 2025-07-25 DIAGNOSIS — G45.9 TIA (TRANSIENT ISCHEMIC ATTACK): ICD-10-CM

## 2025-07-25 DIAGNOSIS — G43.811 OTHER MIGRAINE WITH STATUS MIGRAINOSUS, INTRACTABLE: ICD-10-CM

## 2025-07-25 DIAGNOSIS — D49.7 PITUITARY NEOPLASM: ICD-10-CM

## 2025-07-25 DIAGNOSIS — H53.132 SUDDEN VISUAL LOSS OF LEFT EYE: ICD-10-CM

## 2025-07-25 PROCEDURE — 70553 MRI BRAIN STEM W/O & W/DYE: CPT | Performed by: FAMILY MEDICINE

## 2025-07-25 PROCEDURE — 6360000004 HC RX CONTRAST MEDICATION: Performed by: RADIOLOGY

## 2025-07-25 PROCEDURE — 70544 MR ANGIOGRAPHY HEAD W/O DYE: CPT

## 2025-07-25 PROCEDURE — A9577 INJ MULTIHANCE: HCPCS | Performed by: RADIOLOGY

## 2025-07-25 PROCEDURE — 2500000003 HC RX 250 WO HCPCS: Performed by: RADIOLOGY

## 2025-07-25 RX ORDER — SODIUM CHLORIDE 0.9 % (FLUSH) 0.9 %
5-40 SYRINGE (ML) INJECTION 2 TIMES DAILY
Status: DISCONTINUED | OUTPATIENT
Start: 2025-07-25 | End: 2025-07-28 | Stop reason: HOSPADM

## 2025-07-25 RX ADMIN — GADOBENATE DIMEGLUMINE 17 ML: 529 INJECTION, SOLUTION INTRAVENOUS at 11:57

## 2025-07-25 RX ADMIN — Medication 10 ML: at 11:57

## 2025-07-28 ENCOUNTER — TELEPHONE (OUTPATIENT)
Dept: CARDIOLOGY | Age: 55
End: 2025-07-28

## 2025-07-28 NOTE — TELEPHONE ENCOUNTER
Called patient and l/m to schedule echo    Electronically signed by Mary Heath on 7/28/2025 at 11:07 AM

## 2025-07-29 ENCOUNTER — TELEPHONE (OUTPATIENT)
Dept: CARDIOLOGY | Age: 55
End: 2025-07-29

## 2025-07-29 ENCOUNTER — TELEPHONE (OUTPATIENT)
Dept: PRIMARY CARE CLINIC | Age: 55
End: 2025-07-29

## 2025-07-29 NOTE — TELEPHONE ENCOUNTER
Pt marked as a no show for yesterday but she tried to call and couldn't get though, tried twice,  which triggered a migraine. She just wanted you to know that she did try to call and cancel, didn't want you upset with her.

## 2025-07-30 ENCOUNTER — TELEPHONE (OUTPATIENT)
Dept: NON INVASIVE DIAGNOSTICS | Age: 55
End: 2025-07-30

## 2025-07-30 NOTE — TELEPHONE ENCOUNTER
SPOKE WITH PATIENT SHE WILL PLACE AND ACTIVATE MONITOR TODAY 7/30/25.  14 DAY ZIO XT MONITOR ORDERED BY DR ASHLEY KANG, A

## 2025-08-13 ENCOUNTER — HOSPITAL ENCOUNTER (OUTPATIENT)
Dept: CARDIOLOGY | Age: 55
Discharge: HOME | End: 2025-08-15
Attending: FAMILY MEDICINE
Payer: COMMERCIAL

## 2025-08-13 ENCOUNTER — OFFICE VISIT (OUTPATIENT)
Dept: FAMILY MEDICINE CLINIC | Age: 55
End: 2025-08-13
Payer: COMMERCIAL

## 2025-08-13 VITALS
DIASTOLIC BLOOD PRESSURE: 80 MMHG | HEIGHT: 62 IN | TEMPERATURE: 97.1 F | RESPIRATION RATE: 18 BRPM | SYSTOLIC BLOOD PRESSURE: 116 MMHG | WEIGHT: 190.8 LBS | HEART RATE: 61 BPM | BODY MASS INDEX: 35.11 KG/M2 | OXYGEN SATURATION: 99 %

## 2025-08-13 VITALS
HEIGHT: 66 IN | BODY MASS INDEX: 30.86 KG/M2 | SYSTOLIC BLOOD PRESSURE: 104 MMHG | DIASTOLIC BLOOD PRESSURE: 64 MMHG | WEIGHT: 192 LBS

## 2025-08-13 DIAGNOSIS — G45.9 TIA (TRANSIENT ISCHEMIC ATTACK): ICD-10-CM

## 2025-08-13 DIAGNOSIS — G45.3 AMAUROSIS FUGAX: ICD-10-CM

## 2025-08-13 DIAGNOSIS — J01.40 ACUTE NON-RECURRENT PANSINUSITIS: Primary | ICD-10-CM

## 2025-08-13 DIAGNOSIS — H53.132 SUDDEN VISUAL LOSS OF LEFT EYE: ICD-10-CM

## 2025-08-13 PROCEDURE — 2500000003 HC RX 250 WO HCPCS: Performed by: FAMILY MEDICINE

## 2025-08-13 PROCEDURE — 99213 OFFICE O/P EST LOW 20 MIN: CPT | Performed by: FAMILY MEDICINE

## 2025-08-13 PROCEDURE — 93306 TTE W/DOPPLER COMPLETE: CPT

## 2025-08-13 RX ORDER — SODIUM CHLORIDE 0.9 % (FLUSH) 0.9 %
10 SYRINGE (ML) INJECTION PRN
Status: DISCONTINUED | OUTPATIENT
Start: 2025-08-13 | End: 2025-08-16 | Stop reason: HOSPADM

## 2025-08-13 RX ORDER — ASPIRIN 81 MG/1
81 TABLET ORAL DAILY
COMMUNITY

## 2025-08-13 RX ADMIN — SODIUM CHLORIDE, PRESERVATIVE FREE 10 ML: 5 INJECTION INTRAVENOUS at 11:10

## 2025-08-13 RX ADMIN — SODIUM CHLORIDE, PRESERVATIVE FREE 10 ML: 5 INJECTION INTRAVENOUS at 11:12

## 2025-08-13 RX ADMIN — SODIUM CHLORIDE, PRESERVATIVE FREE 10 ML: 5 INJECTION INTRAVENOUS at 11:11

## 2025-08-13 ASSESSMENT — ENCOUNTER SYMPTOMS
TROUBLE SWALLOWING: 0
PHOTOPHOBIA: 0
EYE DISCHARGE: 0
ALLERGIC/IMMUNOLOGIC NEGATIVE: 1
CHEST TIGHTNESS: 0
NAUSEA: 0
EYE REDNESS: 0
ABDOMINAL PAIN: 0
BLOOD IN STOOL: 0
EYE PAIN: 0
COUGH: 0
VOMITING: 0
SHORTNESS OF BREATH: 0
BACK PAIN: 0
DIARRHEA: 0
SINUS PRESSURE: 1
SINUS PAIN: 1
SORE THROAT: 0

## 2025-08-14 LAB
ECHO AO ASC DIAM: 2.6 CM
ECHO AO ASCENDING AORTA INDEX: 1.39 CM/M2
ECHO AV AREA PEAK VELOCITY: 1.7 CM2
ECHO AV AREA VTI: 1.9 CM2
ECHO AV AREA/BSA PEAK VELOCITY: 0.9 CM2/M2
ECHO AV AREA/BSA VTI: 1 CM2/M2
ECHO AV CUSP MM: 2.1 CM
ECHO AV MEAN GRADIENT: 5 MMHG
ECHO AV MEAN VELOCITY: 1 M/S
ECHO AV PEAK GRADIENT: 10 MMHG
ECHO AV PEAK VELOCITY: 1.6 M/S
ECHO AV VELOCITY RATIO: 0.63
ECHO AV VTI: 29.8 CM
ECHO BSA: 1.95 M2
ECHO EST RA PRESSURE: 3 MMHG
ECHO LA DIAMETER INDEX: 1.55 CM/M2
ECHO LA DIAMETER: 2.9 CM
ECHO LA VOL A-L A2C: 75 ML (ref 22–52)
ECHO LA VOL A-L A4C: 22 ML (ref 22–52)
ECHO LA VOL MOD A2C: 72 ML (ref 22–52)
ECHO LA VOL MOD A4C: 21 ML (ref 22–52)
ECHO LA VOLUME AREA LENGTH: 46 ML
ECHO LA VOLUME INDEX A-L A2C: 40 ML/M2 (ref 16–34)
ECHO LA VOLUME INDEX A-L A4C: 12 ML/M2 (ref 16–34)
ECHO LA VOLUME INDEX AREA LENGTH: 25 ML/M2 (ref 16–34)
ECHO LA VOLUME INDEX MOD A2C: 39 ML/M2 (ref 16–34)
ECHO LA VOLUME INDEX MOD A4C: 11 ML/M2 (ref 16–34)
ECHO LV EF PHYSICIAN: 55 %
ECHO LV FRACTIONAL SHORTENING: 38 % (ref 28–44)
ECHO LV INTERNAL DIMENSION DIASTOLE INDEX: 2.57 CM/M2
ECHO LV INTERNAL DIMENSION DIASTOLIC: 4.8 CM (ref 3.9–5.3)
ECHO LV INTERNAL DIMENSION SYSTOLIC INDEX: 1.6 CM/M2
ECHO LV INTERNAL DIMENSION SYSTOLIC: 3 CM
ECHO LV ISOVOLUMETRIC RELAXATION TIME (IVRT): 50.8 MS
ECHO LV IVSD: 0.6 CM (ref 0.6–0.9)
ECHO LV IVSS: 1.2 CM
ECHO LV MASS 2D: 88.3 G (ref 67–162)
ECHO LV MASS INDEX 2D: 47.2 G/M2 (ref 43–95)
ECHO LV POSTERIOR WALL DIASTOLIC: 0.6 CM (ref 0.6–0.9)
ECHO LV POSTERIOR WALL SYSTOLIC: 1.2 CM
ECHO LV RELATIVE WALL THICKNESS RATIO: 0.25
ECHO LVOT AREA: 2.8 CM2
ECHO LVOT AV VTI INDEX: 0.66
ECHO LVOT DIAM: 1.9 CM
ECHO LVOT MEAN GRADIENT: 2 MMHG
ECHO LVOT PEAK GRADIENT: 4 MMHG
ECHO LVOT PEAK VELOCITY: 1 M/S
ECHO LVOT STROKE VOLUME INDEX: 29.9 ML/M2
ECHO LVOT SV: 55.8 ML
ECHO LVOT VTI: 19.7 CM
ECHO MV "A" WAVE DURATION: 78.4 MSEC
ECHO MV A VELOCITY: 0.8 M/S
ECHO MV AREA PHT: 3.8 CM2
ECHO MV AREA VTI: 1.8 CM2
ECHO MV E DECELERATION TIME (DT): 210.4 MS
ECHO MV E VELOCITY: 0.82 M/S
ECHO MV E/A RATIO: 1.03
ECHO MV LVOT VTI INDEX: 1.59
ECHO MV MAX VELOCITY: 0.9 M/S
ECHO MV MEAN GRADIENT: 1 MMHG
ECHO MV MEAN VELOCITY: 0.5 M/S
ECHO MV PEAK GRADIENT: 3 MMHG
ECHO MV PRESSURE HALF TIME (PHT): 58.5 MS
ECHO MV VTI: 31.3 CM
ECHO PV MAX VELOCITY: 1 M/S
ECHO PV MEAN GRADIENT: 2 MMHG
ECHO PV MEAN VELOCITY: 0.7 M/S
ECHO PV PEAK GRADIENT: 4 MMHG
ECHO PV VTI: 22.5 CM
ECHO PVEIN A DURATION: 92.3 MS
ECHO PVEIN A VELOCITY: 0.3 M/S
ECHO PVEIN PEAK D VELOCITY: 0.7 M/S
ECHO PVEIN PEAK S VELOCITY: 0.8 M/S
ECHO PVEIN S/D RATIO: 1.1
ECHO RV INTERNAL DIMENSION: 1.8 CM

## 2025-08-19 ENCOUNTER — OFFICE VISIT (OUTPATIENT)
Dept: PRIMARY CARE CLINIC | Age: 55
End: 2025-08-19
Payer: COMMERCIAL

## 2025-08-19 VITALS
BODY MASS INDEX: 30.53 KG/M2 | DIASTOLIC BLOOD PRESSURE: 78 MMHG | HEART RATE: 64 BPM | OXYGEN SATURATION: 98 % | WEIGHT: 190 LBS | HEIGHT: 66 IN | SYSTOLIC BLOOD PRESSURE: 122 MMHG

## 2025-08-19 DIAGNOSIS — E55.9 VITAMIN D DEFICIENCY: ICD-10-CM

## 2025-08-19 DIAGNOSIS — D49.7 PITUITARY NEOPLASM: Primary | ICD-10-CM

## 2025-08-19 DIAGNOSIS — M81.0 OSTEOPOROSIS, UNSPECIFIED OSTEOPOROSIS TYPE, UNSPECIFIED PATHOLOGICAL FRACTURE PRESENCE: ICD-10-CM

## 2025-08-19 DIAGNOSIS — R73.03 PREDIABETES: ICD-10-CM

## 2025-08-19 DIAGNOSIS — G43.811 OTHER MIGRAINE WITH STATUS MIGRAINOSUS, INTRACTABLE: ICD-10-CM

## 2025-08-19 PROCEDURE — 99214 OFFICE O/P EST MOD 30 MIN: CPT | Performed by: FAMILY MEDICINE
